# Patient Record
Sex: MALE | Race: BLACK OR AFRICAN AMERICAN | Employment: OTHER | URBAN - METROPOLITAN AREA
[De-identification: names, ages, dates, MRNs, and addresses within clinical notes are randomized per-mention and may not be internally consistent; named-entity substitution may affect disease eponyms.]

---

## 2017-09-14 ENCOUNTER — IP HISTORICAL/CONVERTED ENCOUNTER (OUTPATIENT)
Dept: OTHER | Age: 77
End: 2017-09-14

## 2017-09-24 ENCOUNTER — IP HISTORICAL/CONVERTED ENCOUNTER (OUTPATIENT)
Dept: OTHER | Age: 77
End: 2017-09-24

## 2017-10-02 ENCOUNTER — ED HISTORICAL/CONVERTED ENCOUNTER (OUTPATIENT)
Dept: OTHER | Age: 77
End: 2017-10-02

## 2018-02-12 ENCOUNTER — OP HISTORICAL/CONVERTED ENCOUNTER (OUTPATIENT)
Dept: OTHER | Age: 78
End: 2018-02-12

## 2018-03-18 ENCOUNTER — IP HISTORICAL/CONVERTED ENCOUNTER (OUTPATIENT)
Dept: OTHER | Age: 78
End: 2018-03-18

## 2018-04-05 ENCOUNTER — IP HISTORICAL/CONVERTED ENCOUNTER (OUTPATIENT)
Dept: OTHER | Age: 78
End: 2018-04-05

## 2018-04-19 ENCOUNTER — IP HISTORICAL/CONVERTED ENCOUNTER (OUTPATIENT)
Dept: OTHER | Age: 78
End: 2018-04-19

## 2018-05-05 ENCOUNTER — IP HISTORICAL/CONVERTED ENCOUNTER (OUTPATIENT)
Dept: OTHER | Age: 78
End: 2018-05-05

## 2018-05-21 ENCOUNTER — OP HISTORICAL/CONVERTED ENCOUNTER (OUTPATIENT)
Dept: OTHER | Age: 78
End: 2018-05-21

## 2018-06-26 ENCOUNTER — OP HISTORICAL/CONVERTED ENCOUNTER (OUTPATIENT)
Dept: OTHER | Age: 78
End: 2018-06-26

## 2018-07-12 ENCOUNTER — IP HISTORICAL/CONVERTED ENCOUNTER (OUTPATIENT)
Dept: OTHER | Age: 78
End: 2018-07-12

## 2018-07-22 ENCOUNTER — IP HISTORICAL/CONVERTED ENCOUNTER (OUTPATIENT)
Dept: OTHER | Age: 78
End: 2018-07-22

## 2018-08-01 ENCOUNTER — OP HISTORICAL/CONVERTED ENCOUNTER (OUTPATIENT)
Dept: OTHER | Age: 78
End: 2018-08-01

## 2018-08-07 ENCOUNTER — OP HISTORICAL/CONVERTED ENCOUNTER (OUTPATIENT)
Dept: OTHER | Age: 78
End: 2018-08-07

## 2018-08-13 ENCOUNTER — ED HISTORICAL/CONVERTED ENCOUNTER (OUTPATIENT)
Dept: OTHER | Age: 78
End: 2018-08-13

## 2018-08-24 ENCOUNTER — OP HISTORICAL/CONVERTED ENCOUNTER (OUTPATIENT)
Dept: OTHER | Age: 78
End: 2018-08-24

## 2018-08-28 ENCOUNTER — OP HISTORICAL/CONVERTED ENCOUNTER (OUTPATIENT)
Dept: OTHER | Age: 78
End: 2018-08-28

## 2018-09-11 ENCOUNTER — OP HISTORICAL/CONVERTED ENCOUNTER (OUTPATIENT)
Dept: OTHER | Age: 78
End: 2018-09-11

## 2018-10-17 ENCOUNTER — OP HISTORICAL/CONVERTED ENCOUNTER (OUTPATIENT)
Dept: OTHER | Age: 78
End: 2018-10-17

## 2018-10-29 ENCOUNTER — IP HISTORICAL/CONVERTED ENCOUNTER (OUTPATIENT)
Dept: OTHER | Age: 78
End: 2018-10-29

## 2018-11-05 ENCOUNTER — OP HISTORICAL/CONVERTED ENCOUNTER (OUTPATIENT)
Dept: OTHER | Age: 78
End: 2018-11-05

## 2018-12-11 ENCOUNTER — IP HISTORICAL/CONVERTED ENCOUNTER (OUTPATIENT)
Dept: OTHER | Age: 78
End: 2018-12-11

## 2018-12-25 ENCOUNTER — IP HISTORICAL/CONVERTED ENCOUNTER (OUTPATIENT)
Dept: OTHER | Age: 78
End: 2018-12-25

## 2018-12-31 ENCOUNTER — IP HISTORICAL/CONVERTED ENCOUNTER (OUTPATIENT)
Dept: OTHER | Age: 78
End: 2018-12-31

## 2019-01-19 ENCOUNTER — ED HISTORICAL/CONVERTED ENCOUNTER (OUTPATIENT)
Dept: OTHER | Age: 79
End: 2019-01-19

## 2019-01-21 ENCOUNTER — IP HISTORICAL/CONVERTED ENCOUNTER (OUTPATIENT)
Dept: OTHER | Age: 79
End: 2019-01-21

## 2019-03-06 ENCOUNTER — IP HISTORICAL/CONVERTED ENCOUNTER (OUTPATIENT)
Dept: OTHER | Age: 79
End: 2019-03-06

## 2019-04-03 ENCOUNTER — ED HISTORICAL/CONVERTED ENCOUNTER (OUTPATIENT)
Dept: OTHER | Age: 79
End: 2019-04-03

## 2019-04-09 ENCOUNTER — TELEPHONE (OUTPATIENT)
Dept: CARDIOLOGY CLINIC | Age: 79
End: 2019-04-09

## 2019-04-09 NOTE — TELEPHONE ENCOUNTER
Please arrange for robe estrella [birth year] to see me Weds or Friday of this week from Dr. Carrol Pepper office to discuss upgrade of his ppm to CRTD. Hx CABG, no need for further ischemic eval; EF 30%, recurrent HF exacerbations. PPM dependent.      nishant Le

## 2019-04-22 ENCOUNTER — OFFICE VISIT (OUTPATIENT)
Dept: CARDIOLOGY CLINIC | Age: 79
End: 2019-04-22

## 2019-04-22 VITALS
RESPIRATION RATE: 20 BRPM | OXYGEN SATURATION: 98 % | HEIGHT: 66 IN | BODY MASS INDEX: 29.02 KG/M2 | SYSTOLIC BLOOD PRESSURE: 102 MMHG | WEIGHT: 180.6 LBS | HEART RATE: 76 BPM | DIASTOLIC BLOOD PRESSURE: 60 MMHG

## 2019-04-22 DIAGNOSIS — I50.9 OTHER CONGESTIVE HEART FAILURE (HCC): Primary | ICD-10-CM

## 2019-04-22 DIAGNOSIS — Z95.0 PACEMAKER: ICD-10-CM

## 2019-04-22 RX ORDER — CLOPIDOGREL BISULFATE 75 MG/1
TABLET ORAL
Refills: 3 | COMMUNITY
Start: 2019-04-02

## 2019-04-22 RX ORDER — NATEGLINIDE 120 MG/1
TABLET ORAL
Refills: 1 | COMMUNITY
Start: 2019-04-18 | End: 2019-07-05

## 2019-04-22 RX ORDER — LEVOTHYROXINE SODIUM 88 UG/1
TABLET ORAL
Refills: 1 | COMMUNITY
Start: 2019-03-25

## 2019-04-22 RX ORDER — METOPROLOL SUCCINATE 25 MG/1
TABLET, EXTENDED RELEASE ORAL
Refills: 4 | COMMUNITY
Start: 2019-04-02 | End: 2020-10-16

## 2019-04-22 RX ORDER — EZETIMIBE 10 MG/1
TABLET ORAL
Refills: 1 | COMMUNITY
Start: 2019-04-18

## 2019-04-22 RX ORDER — LISINOPRIL 5 MG/1
TABLET ORAL
Refills: 3 | COMMUNITY
Start: 2019-03-05 | End: 2020-10-16

## 2019-04-22 RX ORDER — NITROGLYCERIN 0.4 MG/1
0.4 TABLET SUBLINGUAL
COMMUNITY

## 2019-04-22 RX ORDER — ATORVASTATIN CALCIUM 40 MG/1
TABLET, FILM COATED ORAL
Refills: 1 | COMMUNITY
Start: 2019-03-21 | End: 2020-10-16

## 2019-04-22 RX ORDER — MELATONIN
DAILY
COMMUNITY

## 2019-04-22 RX ORDER — SITAGLIPTIN 100 MG/1
TABLET, FILM COATED ORAL
Refills: 0 | COMMUNITY
Start: 2019-04-11 | End: 2020-10-22 | Stop reason: ALTCHOICE

## 2019-04-22 RX ORDER — RANOLAZINE 1000 MG/1
TABLET, EXTENDED RELEASE ORAL
Refills: 4 | COMMUNITY
Start: 2019-04-18

## 2019-04-22 RX ORDER — FUROSEMIDE 40 MG/1
TABLET ORAL
Refills: 1 | COMMUNITY
Start: 2019-03-29 | End: 2020-10-22 | Stop reason: ALTCHOICE

## 2019-04-22 RX ORDER — LANOLIN ALCOHOL/MO/W.PET/CERES
CREAM (GRAM) TOPICAL
Refills: 1 | COMMUNITY
Start: 2019-03-21

## 2019-04-22 RX ORDER — CYANOCOBALAMIN 1000 UG/ML
INJECTION, SOLUTION INTRAMUSCULAR; SUBCUTANEOUS
Refills: 5 | COMMUNITY
Start: 2019-03-25

## 2019-04-22 RX ORDER — PANTOPRAZOLE SODIUM 40 MG/1
TABLET, DELAYED RELEASE ORAL
Refills: 1 | COMMUNITY
Start: 2019-02-23

## 2019-04-22 NOTE — PROGRESS NOTES
Room # 3 SOB with least exertion, palpitations, when walking legs get to feeling weak and hands shake Visit Vitals /60 (BP 1 Location: Left arm, BP Patient Position: Sitting) Pulse 76 Resp 20 Ht 5' 6\" (1.676 m) Wt 180 lb 9.6 oz (81.9 kg) SpO2 98% BMI 29.15 kg/m²

## 2019-04-22 NOTE — PROGRESS NOTES
HISTORY OF PRESENTING ILLNESS      Pham Jesus is a [de-identified] y.o. male with history of ischemic cardiomyopathy, CAD/CABG (1988) , left ventricular ejection fraction 25-30%, moderate tricuspid regurgitation, myocardial infarction, severe peripheral vascular disease status post peripheral intervention (3/21/2018), dyslipidemia, dual-chamber Medtronic pacemaker, hypertension, diabetes mellitus presenting for consideration for upgrade of his ICD to biventricular system. He is currently NYHA class II-III. He has been on optimal guideline directed medical therapy long-term. Device interrogation recently revealed a greater than 40% ventricular pacing burden. EKG today shows a QRS duration of 166 ms. ACTIVE PROBLEM LIST     There are no active problems to display for this patient. PAST MEDICAL HISTORY     No past medical history on file. PAST SURGICAL HISTORY     No past surgical history on file. ALLERGIES     Allergies not on file       FAMILY HISTORY     No family history on file. negative for cardiac disease       SOCIAL HISTORY            MEDICATIONS     No current outpatient medications on file. No current facility-administered medications for this visit. I have reviewed the nurses notes, vitals, problem list, allergy list, medical history, family, social history and medications. REVIEW OF SYMPTOMS      General: Pt denies excessive weight gain or loss. Pt is able to conduct ADL's  HEENT: Denies blurred vision, headaches, hearing loss, epistaxis and difficulty swallowing. Respiratory: Denies cough, congestion, shortness of breath, RICHEY, wheezing or stridor.   Cardiovascular: Denies precordial pain, palpitations, edema or PND  Gastrointestinal: Denies poor appetite, indigestion, abdominal pain or blood in stool  Genitourinary: Denies hematuria, dysuria, increased urinary frequency  Musculoskeletal: Denies joint pain or swelling from muscles or joints  Neurologic: Denies tremor, paresthesias, headache, or sensory motor disturbance  Psychiatric: Denies confusion, insomnia, depression  Integumentray: Denies rash, itching or ulcers. Hematologic: Denies easy bruising, bleeding       PHYSICAL EXAMINATION      There were no vitals filed for this visit. General: Well developed, in no acute distress. HEENT: No jaundice, oral mucosa moist, no oral ulcers  Neck: Supple, no stiffness, no lymphadenopathy, supple  Heart:  Normal S1/S2 negative S3 or S4. Regular, no murmur, gallop or rub, no jugular venous distention  Respiratory: Clear bilaterally x 4, no wheezing or rales  Abdomen:   Soft, non-tender, bowel sounds are active.   Extremities:  No edema, normal cap refill, no cyanosis. Musculoskeletal: No clubbing, no deformities  Neuro: A&Ox3, speech clear, gait stable, cooperative, no focal neurologic deficits  Skin: Skin color is normal. No rashes or lesions. Non diaphoretic, moist.  Vascular: 2+ pulses symmetric in all extremities       DIAGNOSTIC DATA      EKG: Sinus rhythm with left bundle branch block       LABORATORY DATA    No results found for: WBC, HGBPOC, HGB, HGBP, HCTPOC, HCT, PHCT, RBCH, PLT, MCV, HGBEXT, HCTEXT, PLTEXT   No results found for: NA, K, CL, CO2, AGAP, GLU, BUN, CREA, BUCR, GFRAA, GFRNA, CA, TBIL, TBILI, GPT, SGOT, AP, TP, ALB, GLOB, AGRAT, ALT        ASSESSMENT      1. Cardiomyopathy    A. Ischemic    B. NYHA class II-III    C. LVEF 25-30%   2. Left bundle branch block  3. Diabetes mellitus  4. CAD, native  5. CABG  6. History myocardial infarction  7. Pacemaker   A. Dual-chamber   B. Left-sided   C. Medtronic   D. 9/2017  8. Hypertension  9. Peripheral vascular disease status post intervention  10. Diverticulosis with history of rectal bleeding       PLAN     Plan for upgrade of dual-chamber PPM to a CRTD system for primary prevention of sudden death.         FOLLOW-UP     Post procedure      Thank you, Dr. Keshia Caldwell for allowing me to participate in the care of this extraordinarily pleasant male. Please do not hesitate to contact me for further questions/concerns.          Dominick iLriano MD  Cardiac Electrophysiology / Cardiology    [address]  [address] [facility] [place] [facility], [place] [address]  [facility]    [address] [facility]  [phone] / [phone] Fax   [phone] / [phone] Fax

## 2019-04-22 NOTE — PATIENT INSTRUCTIONS

## 2019-04-23 DIAGNOSIS — Z95.0 PACEMAKER: ICD-10-CM

## 2019-04-23 DIAGNOSIS — I50.9 OTHER CONGESTIVE HEART FAILURE (HCC): Primary | ICD-10-CM

## 2019-04-23 PROBLEM — I44.7 LEFT BUNDLE BRANCH BLOCK: Status: ACTIVE | Noted: 2019-04-23

## 2019-04-23 PROBLEM — I25.5 ISCHEMIC CARDIOMYOPATHY: Status: ACTIVE | Noted: 2019-04-23

## 2019-05-10 RX ORDER — SODIUM CHLORIDE 0.9 % (FLUSH) 0.9 %
5-40 SYRINGE (ML) INJECTION EVERY 8 HOURS
Status: CANCELLED | OUTPATIENT
Start: 2019-05-10

## 2019-05-10 RX ORDER — SODIUM CHLORIDE 0.9 % (FLUSH) 0.9 %
5-40 SYRINGE (ML) INJECTION AS NEEDED
Status: CANCELLED | OUTPATIENT
Start: 2019-05-10

## 2019-05-17 ENCOUNTER — APPOINTMENT (OUTPATIENT)
Dept: GENERAL RADIOLOGY | Age: 79
End: 2019-05-17
Attending: INTERNAL MEDICINE
Payer: MEDICARE

## 2019-05-17 ENCOUNTER — HOSPITAL ENCOUNTER (OUTPATIENT)
Age: 79
Setting detail: OBSERVATION
Discharge: HOME | End: 2019-05-18
Attending: INTERNAL MEDICINE | Admitting: INTERNAL MEDICINE
Payer: MEDICARE

## 2019-05-17 DIAGNOSIS — I42.9 CARDIOMYOPATHY (HCC): ICD-10-CM

## 2019-05-17 LAB
GLUCOSE BLD STRIP.AUTO-MCNC: 153 MG/DL (ref 65–100)
GLUCOSE BLD STRIP.AUTO-MCNC: 168 MG/DL (ref 65–100)
GLUCOSE BLD STRIP.AUTO-MCNC: 203 MG/DL (ref 65–100)
GLUCOSE BLD STRIP.AUTO-MCNC: 206 MG/DL (ref 65–100)
GLUCOSE BLD STRIP.AUTO-MCNC: 209 MG/DL (ref 65–100)
SERVICE CMNT-IMP: ABNORMAL

## 2019-05-17 PROCEDURE — 77030037713 HC CLOSR DEV INCIS ZIP STRY -B: Performed by: INTERNAL MEDICINE

## 2019-05-17 PROCEDURE — C1777 LEAD, AICD, ENDO SINGLE COIL: HCPCS | Performed by: INTERNAL MEDICINE

## 2019-05-17 PROCEDURE — 99218 HC RM OBSERVATION: CPT

## 2019-05-17 PROCEDURE — 74011636637 HC RX REV CODE- 636/637: Performed by: NURSE PRACTITIONER

## 2019-05-17 PROCEDURE — 74011000272 HC RX REV CODE- 272: Performed by: INTERNAL MEDICINE

## 2019-05-17 PROCEDURE — 82962 GLUCOSE BLOOD TEST: CPT

## 2019-05-17 PROCEDURE — 74011000258 HC RX REV CODE- 258: Performed by: INTERNAL MEDICINE

## 2019-05-17 PROCEDURE — 77030031139 HC SUT VCRL2 J&J -A: Performed by: INTERNAL MEDICINE

## 2019-05-17 PROCEDURE — 99153 MOD SED SAME PHYS/QHP EA: CPT | Performed by: INTERNAL MEDICINE

## 2019-05-17 PROCEDURE — 77030003390 HC NDL ANGI MRTM -A: Performed by: INTERNAL MEDICINE

## 2019-05-17 PROCEDURE — 77030002933 HC SUT MCRYL J&J -A: Performed by: INTERNAL MEDICINE

## 2019-05-17 PROCEDURE — 74011250637 HC RX REV CODE- 250/637: Performed by: INTERNAL MEDICINE

## 2019-05-17 PROCEDURE — 74011636320 HC RX REV CODE- 636/320: Performed by: INTERNAL MEDICINE

## 2019-05-17 PROCEDURE — C1900 LEAD, CORONARY VENOUS: HCPCS | Performed by: INTERNAL MEDICINE

## 2019-05-17 PROCEDURE — 77030018547 HC SUT ETHBND1 J&J -B: Performed by: INTERNAL MEDICINE

## 2019-05-17 PROCEDURE — C1769 GUIDE WIRE: HCPCS | Performed by: INTERNAL MEDICINE

## 2019-05-17 PROCEDURE — C1893 INTRO/SHEATH, FIXED,NON-PEEL: HCPCS | Performed by: INTERNAL MEDICINE

## 2019-05-17 PROCEDURE — 74011250636 HC RX REV CODE- 250/636

## 2019-05-17 PROCEDURE — 77030018729 HC ELECTRD DEFIB PAD CARD -B: Performed by: INTERNAL MEDICINE

## 2019-05-17 PROCEDURE — 77030018673: Performed by: INTERNAL MEDICINE

## 2019-05-17 PROCEDURE — C1882 AICD, OTHER THAN SING/DUAL: HCPCS | Performed by: INTERNAL MEDICINE

## 2019-05-17 PROCEDURE — 99152 MOD SED SAME PHYS/QHP 5/>YRS: CPT | Performed by: INTERNAL MEDICINE

## 2019-05-17 PROCEDURE — 33249 INSJ/RPLCMT DEFIB W/LEAD(S): CPT | Performed by: INTERNAL MEDICINE

## 2019-05-17 PROCEDURE — 77030029065 HC DRSG HEMO QCLOT ZMED -B

## 2019-05-17 PROCEDURE — 74011000250 HC RX REV CODE- 250: Performed by: INTERNAL MEDICINE

## 2019-05-17 PROCEDURE — A4565 SLINGS: HCPCS | Performed by: INTERNAL MEDICINE

## 2019-05-17 PROCEDURE — C1887 CATHETER, GUIDING: HCPCS | Performed by: INTERNAL MEDICINE

## 2019-05-17 PROCEDURE — 74011250636 HC RX REV CODE- 250/636: Performed by: INTERNAL MEDICINE

## 2019-05-17 PROCEDURE — 77030012935 HC DRSG AQUACEL BMS -B: Performed by: INTERNAL MEDICINE

## 2019-05-17 PROCEDURE — 77030028698 HC BLD TISS PLSM MEDT -D: Performed by: INTERNAL MEDICINE

## 2019-05-17 PROCEDURE — 71046 X-RAY EXAM CHEST 2 VIEWS: CPT

## 2019-05-17 PROCEDURE — C1894 INTRO/SHEATH, NON-LASER: HCPCS | Performed by: INTERNAL MEDICINE

## 2019-05-17 DEVICE — CRTD DTMA1QQ CLARIA MRI QUAD CRTD US
Type: IMPLANTABLE DEVICE | Status: FUNCTIONAL
Brand: CLARIA MRI™ QUAD CRT-D SURESCAN™

## 2019-05-17 DEVICE — LEAD 6935M62 QUATTRO SECURE S MRI US
Type: IMPLANTABLE DEVICE | Status: FUNCTIONAL
Brand: SPRINT QUATTRO SECURE S MRI™ SURESCAN™

## 2019-05-17 DEVICE — LEAD 429888 MRI CANT US
Type: IMPLANTABLE DEVICE | Status: FUNCTIONAL
Brand: ATTAIN PERFORMA™ MRI SURESCAN™

## 2019-05-17 RX ORDER — PANTOPRAZOLE SODIUM 40 MG/1
40 TABLET, DELAYED RELEASE ORAL
Status: DISCONTINUED | OUTPATIENT
Start: 2019-05-18 | End: 2019-05-18 | Stop reason: HOSPADM

## 2019-05-17 RX ORDER — CYPROHEPTADINE HYDROCHLORIDE 4 MG/1
4 TABLET ORAL
COMMUNITY
End: 2019-07-05

## 2019-05-17 RX ORDER — LISINOPRIL 5 MG/1
2.5 TABLET ORAL DAILY
Status: DISCONTINUED | OUTPATIENT
Start: 2019-05-17 | End: 2019-05-18 | Stop reason: HOSPADM

## 2019-05-17 RX ORDER — SODIUM CHLORIDE 0.9 % (FLUSH) 0.9 %
5-40 SYRINGE (ML) INJECTION EVERY 8 HOURS
Status: DISCONTINUED | OUTPATIENT
Start: 2019-05-17 | End: 2019-05-17 | Stop reason: HOSPADM

## 2019-05-17 RX ORDER — EZETIMIBE 10 MG/1
10 TABLET ORAL DAILY
Status: DISCONTINUED | OUTPATIENT
Start: 2019-05-18 | End: 2019-05-18 | Stop reason: HOSPADM

## 2019-05-17 RX ORDER — FUROSEMIDE 40 MG/1
40 TABLET ORAL DAILY
Status: DISCONTINUED | OUTPATIENT
Start: 2019-05-18 | End: 2019-05-18 | Stop reason: HOSPADM

## 2019-05-17 RX ORDER — LEVOTHYROXINE SODIUM 88 UG/1
88 TABLET ORAL
Status: DISCONTINUED | OUTPATIENT
Start: 2019-05-18 | End: 2019-05-18 | Stop reason: HOSPADM

## 2019-05-17 RX ORDER — INSULIN LISPRO 100 [IU]/ML
INJECTION, SOLUTION INTRAVENOUS; SUBCUTANEOUS
Status: DISCONTINUED | OUTPATIENT
Start: 2019-05-17 | End: 2019-05-18 | Stop reason: HOSPADM

## 2019-05-17 RX ORDER — ONDANSETRON 2 MG/ML
4 INJECTION INTRAMUSCULAR; INTRAVENOUS
Status: DISCONTINUED | OUTPATIENT
Start: 2019-05-17 | End: 2019-05-18 | Stop reason: HOSPADM

## 2019-05-17 RX ORDER — SODIUM CHLORIDE 0.9 % (FLUSH) 0.9 %
5-40 SYRINGE (ML) INJECTION AS NEEDED
Status: DISCONTINUED | OUTPATIENT
Start: 2019-05-17 | End: 2019-05-18 | Stop reason: HOSPADM

## 2019-05-17 RX ORDER — RANOLAZINE 500 MG/1
1000 TABLET, EXTENDED RELEASE ORAL 2 TIMES DAILY
Status: DISCONTINUED | OUTPATIENT
Start: 2019-05-17 | End: 2019-05-18 | Stop reason: HOSPADM

## 2019-05-17 RX ORDER — CEFAZOLIN SODIUM/WATER 2 G/20 ML
2 SYRINGE (ML) INTRAVENOUS ONCE
Status: DISCONTINUED | OUTPATIENT
Start: 2019-05-17 | End: 2019-05-17 | Stop reason: HOSPADM

## 2019-05-17 RX ORDER — LIDOCAINE HYDROCHLORIDE AND EPINEPHRINE 10; 10 MG/ML; UG/ML
INJECTION, SOLUTION INFILTRATION; PERINEURAL AS NEEDED
Status: DISCONTINUED | OUTPATIENT
Start: 2019-05-17 | End: 2019-05-17 | Stop reason: HOSPADM

## 2019-05-17 RX ORDER — INSULIN GLARGINE 100 [IU]/ML
10 INJECTION, SOLUTION SUBCUTANEOUS DAILY PRN
Status: DISCONTINUED | OUTPATIENT
Start: 2019-05-18 | End: 2019-05-18 | Stop reason: HOSPADM

## 2019-05-17 RX ORDER — NATEGLINIDE 120 MG/1
120 TABLET ORAL
Status: DISCONTINUED | OUTPATIENT
Start: 2019-05-17 | End: 2019-05-18 | Stop reason: HOSPADM

## 2019-05-17 RX ORDER — SODIUM CHLORIDE 0.9 % (FLUSH) 0.9 %
5-40 SYRINGE (ML) INJECTION EVERY 8 HOURS
Status: DISCONTINUED | OUTPATIENT
Start: 2019-05-17 | End: 2019-05-18 | Stop reason: HOSPADM

## 2019-05-17 RX ORDER — SODIUM CHLORIDE 0.9 % (FLUSH) 0.9 %
5-40 SYRINGE (ML) INJECTION AS NEEDED
Status: DISCONTINUED | OUTPATIENT
Start: 2019-05-17 | End: 2019-05-17 | Stop reason: HOSPADM

## 2019-05-17 RX ORDER — ATORVASTATIN CALCIUM 20 MG/1
40 TABLET, FILM COATED ORAL
Status: DISCONTINUED | OUTPATIENT
Start: 2019-05-17 | End: 2019-05-18 | Stop reason: HOSPADM

## 2019-05-17 RX ORDER — GENTAMICIN SULFATE 80 MG/100ML
80 INJECTION, SOLUTION INTRAVENOUS ONCE
Status: COMPLETED | OUTPATIENT
Start: 2019-05-17 | End: 2019-05-17

## 2019-05-17 RX ORDER — FENTANYL CITRATE 50 UG/ML
INJECTION, SOLUTION INTRAMUSCULAR; INTRAVENOUS AS NEEDED
Status: DISCONTINUED | OUTPATIENT
Start: 2019-05-17 | End: 2019-05-17 | Stop reason: HOSPADM

## 2019-05-17 RX ORDER — CEFAZOLIN SODIUM 1 G/3ML
INJECTION, POWDER, FOR SOLUTION INTRAMUSCULAR; INTRAVENOUS AS NEEDED
Status: DISCONTINUED | OUTPATIENT
Start: 2019-05-17 | End: 2019-05-17 | Stop reason: HOSPADM

## 2019-05-17 RX ORDER — HYDROCODONE BITARTRATE AND ACETAMINOPHEN 5; 325 MG/1; MG/1
1 TABLET ORAL
Status: DISCONTINUED | OUTPATIENT
Start: 2019-05-17 | End: 2019-05-18 | Stop reason: HOSPADM

## 2019-05-17 RX ORDER — LIDOCAINE HYDROCHLORIDE 10 MG/ML
INJECTION INFILTRATION; PERINEURAL AS NEEDED
Status: DISCONTINUED | OUTPATIENT
Start: 2019-05-17 | End: 2019-05-17 | Stop reason: HOSPADM

## 2019-05-17 RX ORDER — DEXTROSE MONOHYDRATE 25 G/50ML
12.5-25 INJECTION, SOLUTION INTRAVENOUS AS NEEDED
Status: DISCONTINUED | OUTPATIENT
Start: 2019-05-17 | End: 2019-05-18 | Stop reason: HOSPADM

## 2019-05-17 RX ORDER — ACETAMINOPHEN 325 MG/1
650 TABLET ORAL
Status: DISCONTINUED | OUTPATIENT
Start: 2019-05-17 | End: 2019-05-18 | Stop reason: HOSPADM

## 2019-05-17 RX ORDER — MAGNESIUM SULFATE 100 %
4 CRYSTALS MISCELLANEOUS AS NEEDED
Status: DISCONTINUED | OUTPATIENT
Start: 2019-05-17 | End: 2019-05-18 | Stop reason: HOSPADM

## 2019-05-17 RX ORDER — CEPHALEXIN 500 MG/1
500 CAPSULE ORAL 3 TIMES DAILY
Qty: 15 CAP | Refills: 0 | Status: SHIPPED | OUTPATIENT
Start: 2019-05-17 | End: 2019-05-22

## 2019-05-17 RX ORDER — BUPIVACAINE HYDROCHLORIDE 5 MG/ML
INJECTION, SOLUTION EPIDURAL; INTRACAUDAL AS NEEDED
Status: DISCONTINUED | OUTPATIENT
Start: 2019-05-17 | End: 2019-05-17 | Stop reason: HOSPADM

## 2019-05-17 RX ORDER — MIDAZOLAM HYDROCHLORIDE 1 MG/ML
INJECTION, SOLUTION INTRAMUSCULAR; INTRAVENOUS AS NEEDED
Status: DISCONTINUED | OUTPATIENT
Start: 2019-05-17 | End: 2019-05-17 | Stop reason: HOSPADM

## 2019-05-17 RX ORDER — METOPROLOL SUCCINATE 25 MG/1
25 TABLET, EXTENDED RELEASE ORAL DAILY
Status: DISCONTINUED | OUTPATIENT
Start: 2019-05-17 | End: 2019-05-18 | Stop reason: HOSPADM

## 2019-05-17 RX ADMIN — INSULIN LISPRO 3 UNITS: 100 INJECTION, SOLUTION INTRAVENOUS; SUBCUTANEOUS at 17:23

## 2019-05-17 RX ADMIN — INSULIN LISPRO 2 UNITS: 100 INJECTION, SOLUTION INTRAVENOUS; SUBCUTANEOUS at 13:49

## 2019-05-17 RX ADMIN — HYDROCODONE BITARTRATE AND ACETAMINOPHEN 1 TABLET: 5; 325 TABLET ORAL at 18:59

## 2019-05-17 RX ADMIN — ATORVASTATIN CALCIUM 40 MG: 20 TABLET, FILM COATED ORAL at 21:23

## 2019-05-17 RX ADMIN — METOPROLOL SUCCINATE 25 MG: 25 TABLET, EXTENDED RELEASE ORAL at 14:46

## 2019-05-17 RX ADMIN — RANOLAZINE 1000 MG: 500 TABLET, FILM COATED, EXTENDED RELEASE ORAL at 17:22

## 2019-05-17 RX ADMIN — LISINOPRIL 2.5 MG: 5 TABLET ORAL at 14:46

## 2019-05-17 RX ADMIN — Medication 10 ML: at 14:46

## 2019-05-17 RX ADMIN — CEFAZOLIN 1 G: 1 INJECTION, POWDER, FOR SOLUTION INTRAMUSCULAR; INTRAVENOUS at 17:23

## 2019-05-17 RX ADMIN — NATEGLINIDE 120 MG: 120 TABLET, COATED ORAL at 17:22

## 2019-05-17 RX ADMIN — Medication 2 G: at 09:00

## 2019-05-17 RX ADMIN — Medication 10 ML: at 21:23

## 2019-05-17 NOTE — H&P
HISTORY OF PRESENTING ILLNESS Piyush Michel is a [de-identified] y.o. male with history of ischemic cardiomyopathy, CAD/CABG, left ventricular ejection fraction 25-30%, moderate tricuspid regurgitation, myocardial infarction, severe peripheral vascular disease status post peripheral intervention, dyslipidemia, dual-chamber Medtronic pacemaker, hypertension, diabetes mellitus presenting for consideration for upgrade of his ICD to biventricular system. He is currently NYHA class II-III. He has been on optimal guideline directed medical therapy long-term. Device interrogation recently revealed a greater than 40% ventricular pacing burden. EKG today shows a QRS duration of 166 ms. 
  
  
 ACTIVE PROBLEM LIST  
  
There are no active problems to display for this patient. 
  
  
  
 PAST MEDICAL HISTORY  
  
No past medical history on file.  
  
  
 PAST SURGICAL HISTORY  
  
No past surgical history on file. 
  
  
 ALLERGIES  
  
Allergies not on file  
  
  
FAMILY HISTORY  
  
No family history on file. negative for cardiac disease 
  
  
 SOCIAL HISTORY  
  
  
  
MEDICATIONS  
  
No current outpatient medications on file.  
  
No current facility-administered medications for this visit.   
  
  
I have reviewed the nurses notes, vitals, problem list, allergy list, medical history, family, social history and medications. 
  
  
 REVIEW OF SYMPTOMS General: Pt denies excessive weight gain or loss. Pt is able to conduct ADL's HEENT: Denies blurred vision, headaches, hearing loss, epistaxis and difficulty swallowing. Respiratory: Denies cough, congestion, shortness of breath, RICHEY, wheezing or stridor. Cardiovascular: Denies precordial pain, palpitations, edema or PND Gastrointestinal: Denies poor appetite, indigestion, abdominal pain or blood in stool Genitourinary: Denies hematuria, dysuria, increased urinary frequency Musculoskeletal: Denies joint pain or swelling from muscles or joints Neurologic: Denies tremor, paresthesias, headache, or sensory motor disturbance Psychiatric: Denies confusion, insomnia, depression Integumentray: Denies rash, itching or ulcers. Hematologic: Denies easy bruising, bleeding 
  
  
 PHYSICAL EXAMINATION There were no vitals filed for this visit. General: Well developed, in no acute distress. HEENT: No jaundice, oral mucosa moist, no oral ulcers Neck: Supple, no stiffness, no lymphadenopathy, supple Heart:  Normal S1/S2 negative S3 or S4. Regular, no murmur, gallop or rub, no jugular venous distention Respiratory: Clear bilaterally x 4, no wheezing or rales Abdomen:   Soft, non-tender, bowel sounds are active.  
Extremities:  No edema, normal cap refill, no cyanosis. Musculoskeletal: No clubbing, no deformities Neuro: A&Ox3, speech clear, gait stable, cooperative, no focal neurologic deficits Skin: Skin color is normal. No rashes or lesions. Non diaphoretic, moist. 
Vascular: 2+ pulses symmetric in all extremities 
  
  
 DIAGNOSTIC DATA EKG: Sinus rhythm with left bundle branch block 
  
  
 LABORATORY DATA No results found for: WBC, HGBPOC, HGB, HGBP, HCTPOC, HCT, PHCT, RBCH, PLT, MCV, HGBEXT, HCTEXT, PLTEXT No results found for: NA, K, CL, CO2, AGAP, GLU, BUN, CREA, BUCR, GFRAA, GFRNA, CA, TBIL, TBILI, GPT, SGOT, AP, TP, ALB, GLOB, AGRAT, ALT  
  
  
 ASSESSMENT 1. Cardiomyopathy A. Ischemic B. NYHA class II-III C. LVEF 25-30% 2. Left bundle branch block 3. Diabetes mellitus 4. CAD, native 5. CABG 6. History myocardial infarction 7. Pacemaker Gilles HADLEY Left-sided C. Medtronic D. 9/2017 8. Hypertension 9. Peripheral vascular disease status post intervention 10.   Diverticulosis with history of rectal bleeding 
  
  
 PLAN  
  
Plan for upgrade of dual-chamber PPM to a CRTD system for primary prevention of sudden death.  
  
  
Prince Bolivar MD 
Cardiac Electrophysiology / Cardiology 
  
[address], [facility]5 [facility], [address] [facility] 
[phone] / [phone] Fax                                    [phone] / [phone] Fax

## 2019-05-17 NOTE — Clinical Note
TRANSFER - IN REPORT:  
 
Verbal report received from: santino. Report consisted of patient's Situation, Background, Assessment and  
Recommendations(SBAR). Opportunity for questions and clarification was provided. Assessment completed upon patient's arrival to unit and care assumed. Patient transported with a Registered Nurse.

## 2019-05-17 NOTE — PROGRESS NOTES
Bedside and Verbal shift change report given to Cyrus Cuadra RN (oncoming nurse) by Gideon Swann RN (offgoing nurse). Report included the following information SBAR, Kardex, OR Summary, Procedure Summary, Intake/Output, MAR and Recent Results.

## 2019-05-17 NOTE — Clinical Note
Cardiolab had to be restarted from 0929 to [phone] pt vs monitored on portable equipment.   
 
 
0934 bb/p 116/64  o2 sats 100%  RR 16

## 2019-05-17 NOTE — Clinical Note
TRANSFER - OUT REPORT:  
 
Verbal report given to: [facility] Narvaez Report consisted of patient's Situation, Background, Assessment and  
Recommendations(SBAR). Opportunity for questions and clarification was provided. Patient transported with a Registered Nurse.

## 2019-05-17 NOTE — DISCHARGE INSTRUCTIONS
ICD  Discharge Instructions    Please make sure you have received your Temporary ICD identification card with your discharge instructions      MEDICATIONS         Take only the medications prescribed to you at discharge. ACTIVITY         Return to your normal activity, except as noted below. o Do not lift anything heavier than 10 pounds for 4 weeks with the affected arm. This is how long it takes the muscles to heal, and the leads inside your heart to stabilize their position. o Do not reach above your head with the affected arm for 4 weeks, doing so increases the risk of lead dislodgement.    o It is, however, important to move the affected arm to prevent shoulder stiffness and locking. o Avoid tight clothes or unnecessary pressure over your incision (such as bra straps or seat belts). If it is tender or sensitive to clothing, cover the incision with a soft dressing or pad.  o Questions about driving are individualized and should be discussed with one of the EP Physicians prior to discharge. SHOWERING         Leave the bandage over your incision for 7 days after the ICD implant. You bandage will be removed in clinic in 7 days.  It is important to keep the bandaged area clean and dry. You may shower around the site until the bandage is removed in clinic. Thereafter, you may shower after the bandage is removed, washing it gently with soap and water. Do not apply any lotions, powders, or perfumes to the incision line.  Avoid submerging your incision in water (tub baths, hot tubs, or swimming) for four weeks.  Underneath the dressing. o If you have white steri-strips over your incision (underneath the gauze dressing), they will curl up at the end and fall off, usually within 10 days. Do not pull them off.  - OR -   o You may have a different type of closure for the incision.   If Dermabond Adhesive was used to close your incision, you will receive a separate instruction sheet.      DISCHARGE PRECAUTIONS         Record your temperature every day, at the same time, for 3 weeks after your implant. A temperature of 100.5 F, or higher, can be the first sign of infection. This should be reported to your Doctor immediately.  You cannot have an MRI. You must be aware that any strong magnet or magnetic field can affect your ICD. In general, be careful of metal detectors, heavy machinery, and any area where arc-welding is performed. Avoid metal detectors such as the ones in security checkpoints at [facility] or [address] [place] [address]. When approaching a security checkpoint show your ICD Identification Card to security personnel and ask to be hand searched.  Always tell your doctor or dentist that you have an ICD. Antibiotics may be prescribed before certain procedures.  If you use a cell phone, hold it on the opposite side from where your ICD is implanted.  Your temporary identification will be given to you with these instructions. Keep your ICD card in your wallet or on your person at all times. You should receive your permanent card in 8 weeks. If you do not receive your permanent card, please call the office at [phone]. TAKING YOUR PULSE         Take your pulse the same time every day, preferably in the morning.  Sit down and rest for 5 minutes prior to taking your pulse.  Take your pulse for 1 full minute, use a clock or stop watch with a second hand.  To feel your pulse, use the first two fingers of one hand; place them on the thumb side of the wrist of the opposite hand. The pulse will be steady, regular and throbbing.  Call the office if your pulse is less than 40 beats per minute. SYMPTOMS THAT NEED TO BE REPORTED IMMEDIATELY         Temperature more than 100.4 F     Redness or warmth at the incision site, or pain for longer than the first 5 days after the implant.  Drainage from the incision site.      Swelling around the incision site.  Shortness of breath.  Rapid heart rate or palpitations.  Dizziness, lightheadedness, fainting.  Slow pulse below 40 beats per minute.  REMEMBER: If you feel something is an emergency or cannot be handled over the phone, call 911 or go to the closest emergency room. WHAT TO DO IF YOUR ICD DELIVERS A SHOCK     · If you ICD delivers a shock, you should seek attention at the nearest emergency room, call our office or call 911.           Juan Pablo Wallis MD  Cardiac Electrophysiology / Cardiology    [facility].  [address] [facility], [address] [facility], [address]  [facility]  [phone] / [phone] Fax       [phone] / [phone] Fax

## 2019-05-17 NOTE — PROCEDURES
Cardiac Electrophysiology Report        PATIENT INFORMATION     Patient Name: Donte Pedroza     MRN: [id number]    Study Date: 2019    YOB: 1940      Age: [de-identified] y.o. Gender: male      Procedure: Upgrade of Dual Chamber Pacemaker to Biventricular ICD    Referring Physician:  Rita Sawant MD and Dr. Allyson Larson     Duty Name   Electrophysiologist Zachery Abad MD   Monitor Anesthesia service   Circulator Ambrocio Quintana RT; Gali Flores RN; Kevin Flores RN       PATIENT HISTORY     Handy Ibrahim a [de-identified] y. o. male with history of ischemic cardiomyopathy, CAD/CABG, left ventricular ejection fraction 25-30%, moderate tricuspid regurgitation, myocardial infarction, severe peripheral vascular disease status post peripheral intervention, dyslipidemia, dual-chamber Medtronic pacemaker, hypertension, diabetes mellitus presenting for consideration for upgrade of his ICD to biventricular system.  He is currently NYHA class II-III.  He has been on optimal guideline directed medical therapy long-term.  Device interrogation recently revealed a greater than 40% ventricular pacing burden.  EKG today shows a QRS duration of 166 ms. PROCEDURE     The patient was brought to the Cardiac Electrophysiology laboratory in a post-absorptive, fasting state. Informed consent was obtained. A peripheral IV was in place (5F sheath placed in right femoral vein due to lack of adequate IV access elsewhere). Continuous electrocardiographic, blood pressure, O2 saturation and  CO2 monitoring was initiated. Intravenous antibiotics were administered pre-operatively. Self-adhesive cardioversion patches were positioned on the chest.  Conscious sedation was effectuated according to protocol. The patient was then prepped and draped in the usual sterile fashion. A 50/50 mixture of lidocaine (1%) with epinephrine and bupivicaine (0.5%) was utilized for local anesthesia.  An incision was performed over the pulse generator. Sharp and blunt dissection was carried down to the level of the pulse generator. Hemostasis was maintained with electrocautery. The device as found to be implanted subpectorally. Extra-thoracic, subclavian venous access was obtained x 2 and sheaths were placed using the modified Seldinger technique. A single-coil, DF4 ICD lead was advanced under fluoroscopic guidance and positioned in the right ventricle where stable pacing and sensing characteristics were encountered. The sheath was peeled away and the lead was anchored to the pre-pectoralis fascia using O-ethibond non-absorbable suture material. Using various coronary sinus sheaths/catheters/wires, the coronary sinus was catheterized. A balloon-occlusive venogram was performed and demonstrated several CS branch targets. Once engaged, a quad-electrode LV lead was advanced over a 0.014 ACS wire into the target branch where a stable pace/sense characteristic was encountered without phrenic nerve stimulation. The sheath was then removed and the lead was anchored to the pre-pectoralis fascia using O-ethibond, non-absorbable suture material. The device pocket was then revised to accommodate the new larger device and flushed copiously with antibiotic solution. The chronic pace/sense RV lead was capped and placed into the base of the pocket. The chronic pacemaker generator was removed. An ICD generator was connected to the leads and the generator was placed into the pocket. The device was secured to the pocket using O-ethibond, non-absorbable suture material. The pocket was then closed in three layers using 2-O vicryl, 3-O monocryl absorbable suture material and a zipline. The skin was closed using a sub-cuticular technique. A bio-occlusive dressing were applied to the skin. The patient remained hemodynamically stable, tolerated the procedure well and was transferred in stable condition.  There were no immediate complications encountered during the procedure. No specimen were removed; there was minimal blood loss. LEAD & GENERATOR DATA       Model # Serial #   Explanted Generator Medtronic [id number]   ICD Generator Medtronic [id number]   Atrial Lead Medtronic 9366 [id number]   Chronic pace/sense RV Lead Medtronic 5076 [id number]   ICD Lead Medtronic 6935 [id number]   LV Lead Medtronic 4298 [id number]       PACE/SENSE DATA      Sensed Wave (mV) Threshold (V) Impedance (?)   Right Ventricle 10.8 0.8 601   Left Ventricle >30.0 4.7 1751   Shock ------ ------ 59       FINAL PROGRAMMING     Pacing Mode Lower Rate (ppm) Upper Rate (ppm)   VVI 60 130   VF Rate (bpm) # Antitachycardia Pacing First Shock Energy (J)   207 During charging 35 x 6   VT Rate (bpm) # Antitachycardia Pacing First Shock Energy (J)   182 Busts (3) 35 x 5         DEFIBRILLATION THRESHOLD TESTING     Deferred      MEDICATION SUMMARY     Medication Route Unit Total   Gentamycin IV mg 80   Ancef IV grams 2   Fentanyl IV micrograms 125   Versed IV grams 7       RADIOLOGY SUMMARY      Total   Fluoro Time (minutes) 12.0      Dose Area Product (mGy) 145       CONCLUSIONS     1. Successful upgrade of dual chamber pacemaker to a biventricular ICD system  2. Successful pocket revision  3. IV antibiotics x 24 hours for 3 doses followed by Keflex 500 mg po tid x 5 days. 4. Monitor overnight on telemetry. 5. CXR (PA & lateral) and device interrogation in AM.  6. Possible discharge in AM.  7. Wound check in EP clinic in 10 days. 8. Follow up in EP clinic in 1 month or earlier if necessary. 9. Follow up with Humera Loo MD and Dr. Cony Lee as scheduled. Thank you, Humera Loo MD and Dr. Cony Lee for involving me in the care of this extraordinarily pleasant male.               Jasper Lomeli MD  Cardiac Electrophysiology / Cardiology    [facility] [address] [employer]  [address] [facility], [facility], [address] [place] [address],[address]  [place], [facility].       Arleen Roman  [phone] / [phone] Fax     [phone] / [phone] Fax

## 2019-05-17 NOTE — PROGRESS NOTES
08:00 Patient arrived. ID and allergies verified verbally with patient. Pt voices understanding of procedure to be performed. Consent obtained. Pt prepped for procedure. Rt AC PIV placed by Ultrasound. Accucheck is 153. 
0830 TRANSFER - OUT REPORT: 
 
Verbal report given to ARAM Mann(name) on Piyush Michel  being transferred to (unit) for routine progression of care Report consisted of patients Situation, Background, Assessment and  
Recommendations(SBAR). Information from the following report(s) Procedure Summary was reviewed with the receiving nurse. Lines:  
Peripheral IV 05/17/19 Left Antecubital (Active) Site Assessment Clean, dry, & intact 5/17/2019  7:57 AM  
  
 
Opportunity for questions and clarification was provided. Patient transported with: 
 Registered Nurse 10:30 TRANSFER - IN REPORT: 
 
Verbal report received from ARAM Mann(name) on Kenvir Anand  being received from (unit) for routine progression of care Report consisted of patients Situation, Background, Assessment and  
Recommendations(SBAR). Information from the following report(s) Procedure Summary was reviewed with the receiving nurse. Opportunity for questions and clarification was provided. Assessment completed upon patients arrival to unit and care assumed. 11:00, Radiology up to find venous access. Pt returned from EP with Rt femoral sheath. SPENCER mid line IV site obtained by Jenn Jeronimo 
11:33 Rt femoral sheath pulled,manual pressure,[place] clot drsg applied. 12:15 TRANSFER - OUT REPORT: 
 
Verbal report given to ARAM Matta(name) on Piyush Michel  being transferred to [facility](unit) for routine progression of care Report consisted of patients Situation, Background, Assessment and  
Recommendations(SBAR). Information from the following report(s) Procedure Summary was reviewed with the receiving nurse. Lines:  
Peripheral IV Distal;Right;Upper (Active) Site Assessment Clean, dry, & intact 5/17/2019 11:29 AM  
  
 
Opportunity for questions and clarification was provided. Patient transported with: 
 Monitor Registered Nurse

## 2019-05-18 VITALS
OXYGEN SATURATION: 100 % | DIASTOLIC BLOOD PRESSURE: 50 MMHG | HEIGHT: 66 IN | BODY MASS INDEX: 29.55 KG/M2 | SYSTOLIC BLOOD PRESSURE: 131 MMHG | WEIGHT: 183.86 LBS | HEART RATE: 70 BPM | RESPIRATION RATE: 18 BRPM | TEMPERATURE: 97.8 F

## 2019-05-18 LAB
GLUCOSE BLD STRIP.AUTO-MCNC: 162 MG/DL (ref 65–100)
GLUCOSE BLD STRIP.AUTO-MCNC: 186 MG/DL (ref 65–100)
SERVICE CMNT-IMP: ABNORMAL
SERVICE CMNT-IMP: ABNORMAL

## 2019-05-18 PROCEDURE — 74011250636 HC RX REV CODE- 250/636: Performed by: INTERNAL MEDICINE

## 2019-05-18 PROCEDURE — 99218 HC RM OBSERVATION: CPT

## 2019-05-18 PROCEDURE — 74011250637 HC RX REV CODE- 250/637: Performed by: INTERNAL MEDICINE

## 2019-05-18 PROCEDURE — 74011636637 HC RX REV CODE- 636/637: Performed by: NURSE PRACTITIONER

## 2019-05-18 PROCEDURE — 74011000258 HC RX REV CODE- 258: Performed by: INTERNAL MEDICINE

## 2019-05-18 PROCEDURE — 82962 GLUCOSE BLOOD TEST: CPT

## 2019-05-18 RX ADMIN — PANTOPRAZOLE SODIUM 40 MG: 40 TABLET, DELAYED RELEASE ORAL at 05:58

## 2019-05-18 RX ADMIN — CEFAZOLIN 1 G: 1 INJECTION, POWDER, FOR SOLUTION INTRAMUSCULAR; INTRAVENOUS at 08:24

## 2019-05-18 RX ADMIN — NATEGLINIDE 120 MG: 120 TABLET, COATED ORAL at 13:22

## 2019-05-18 RX ADMIN — FUROSEMIDE 40 MG: 40 TABLET ORAL at 08:22

## 2019-05-18 RX ADMIN — INSULIN LISPRO 2 UNITS: 100 INJECTION, SOLUTION INTRAVENOUS; SUBCUTANEOUS at 08:23

## 2019-05-18 RX ADMIN — EZETIMIBE 10 MG: 10 TABLET ORAL at 08:22

## 2019-05-18 RX ADMIN — INSULIN LISPRO 2 UNITS: 100 INJECTION, SOLUTION INTRAVENOUS; SUBCUTANEOUS at 13:22

## 2019-05-18 RX ADMIN — LEVOTHYROXINE SODIUM 88 MCG: 88 TABLET ORAL at 05:58

## 2019-05-18 RX ADMIN — CEFAZOLIN 1 G: 1 INJECTION, POWDER, FOR SOLUTION INTRAMUSCULAR; INTRAVENOUS at 01:11

## 2019-05-18 RX ADMIN — RANOLAZINE 1000 MG: 500 TABLET, FILM COATED, EXTENDED RELEASE ORAL at 08:23

## 2019-05-18 RX ADMIN — NATEGLINIDE 120 MG: 120 TABLET, COATED ORAL at 08:23

## 2019-05-18 NOTE — DISCHARGE SUMMARY
Cardiac Electrophysiology Discharge Summary       Patient ID:  Julito Martinez  [id number]  89 y.o.  1940    Admit Date: 5/17/2019    Discharge Date: 5/18/2019     Admitting Physician: Elsa Person MD     Discharge Physician: Elsa Person MD    Admission Diagnoses: Cardiomyopathy [facility]) [I42.9]; Cardiomyopathy [facility]) [I42.9]    Discharge Diagnoses: Active Problems:    Cardiomyopathy ([address] (5/17/2019)        Discharge Condition: stable    Cardiology Procedures this Admission:  PPM upgrade to [id number] Course: Patient underwent above procedure without complication and remained stable without acute events. Discharge Exam:  Visit Vitals  BP 98/57 (BP 1 Location: Right arm, BP Patient Position: At rest)   Pulse 69   Temp 97.6 °F (36.4 °C)   Resp 18   Ht 5' 6\" (1.676 m)   Wt 183 lb 13.8 oz (83.4 kg)   SpO2 100%   BMI 29.68 kg/m²       Abdomen: soft, non-tender  Extremities: extremities normal  Heart: regular rate and rhythm  Lungs: clear to auscultation bilaterally  Neck: no JVD  Neurologic: Grossly normal  Pulses: 2+ and symmetric    Disposition: Home    Patient Instructions:   Current Discharge Medication List      START taking these medications    Details   cephALEXin (KEFLEX) 500 mg capsule Take 1 Cap by mouth three (3) times daily for 5 days. Qty: 15 Cap, Refills: 0         CONTINUE these medications which have NOT CHANGED    Details   linaclotide (LINZESS) 72 mcg cap Take 72 mcg by mouth daily as needed. cyproheptadine (PERIACTIN) 4 mg tablet Take 4 mg by mouth three (3) times daily as needed.       metoprolol succinate (TOPROL-XL) 25 mg XL tablet TAKE ONE TABLET BY MOUTH ONE TIME DAILY  Refills: 4      clopidogrel (PLAVIX) 75 mg tab TAKE ONE TABLET BY MOUTH ONE TIME DAILY  Refills: 3      JANUVIA 100 mg tablet TAKE ONE TABLET BY MOUTH ONE TIME DAILY  Refills: 0      ezetimibe (ZETIA) 10 mg tablet TAKE ONE TABLET BY MOUTH AT BEDTIME  Refills: 1      ranolazine ER (RANEXA) 1,000 mg TAKE ONE TABLET BY MOUTH TWICE A DAY  Refills: 4      levothyroxine (SYNTHROID) 88 mcg tablet TAKE ONE TABLET BY MOUTH EVERY MORNING  Refills: 1      nateglinide (STARLIX) 120 mg tablet TAKE ONE TABLET BY MOUTH THREE TIMES A DAY  Refills: 1      cyanocobalamin (VITAMIN B12) 1,000 mcg/mL injection INJECT 1 ML SUBCUTANEOUSLY ONCE MONTHLY  Refills: 5      atorvastatin (LIPITOR) 40 mg tablet TAKE ONE TABLET BY MOUTH ONE TIME DAILY AT BEDTIME  Refills: 1      ferrous sulfate 325 mg (65 mg iron) tablet TAKE ONE TABLET BY MOUTH TWICE A DAY  Refills: 1      lisinopril (PRINIVIL, ZESTRIL) 5 mg tablet 2.5 mg daily  Refills: 3      furosemide (LASIX) 40 mg tablet TAKE ONE TABLET BY MOUTH ONE TIME DAILY  Refills: 1      pantoprazole (PROTONIX) 40 mg tablet TAKE ONE TABLET BY MOUTH EVERY MORNING  Refills: 1      cholecalciferol (VITAMIN D3) 1,000 unit tablet Take  by mouth daily. insulin detemir (LEVEMIR FLEXTOUCH U-100 INSULN SC) by SubCUTAneous route. Indications: as needed for BS > 150 once a day then take 10 units      nitroglycerin (NITROSTAT) 0.4 mg SL tablet 0.4 mg by SubLINGual route every five (5) minutes as needed for Chest Pain. Up to 3 doses. Referenced discharge instructions provided by nursing for diet and activity.     Follow-up with Soledad Yu MD             Signed:  Jeet Antunez MD  Cardiac Electrophysiology  5/18/2019  10:19 AM

## 2019-05-18 NOTE — PROGRESS NOTES
Pt. And granddaughter at bedside. Pt. Given d/c instructions. RN goes over paperwork and answers all questions. Pt and granddaughter verbalize understanding. Pt. Signs d/c instructions via e-signature pad. IV removed. Pt. Transported off floor via wheelchair with volunteer. Pt. Will be transported home with granddaughter. Will continue to monitor.

## 2019-05-19 NOTE — PROGRESS NOTES
Documented 5/19/2019. Patient visited by [facility] Partner Volunteer on Medical Surgical Unit on 5/18/2019. 
  
Rev. Pricilla Valiente, [facility] paging service: [phone]

## 2019-06-03 ENCOUNTER — OFFICE VISIT (OUTPATIENT)
Dept: CARDIOLOGY CLINIC | Age: 79
End: 2019-06-03

## 2019-06-03 DIAGNOSIS — Z95.810 BIVENTRICULAR ICD (IMPLANTABLE CARDIOVERTER-DEFIBRILLATOR) IN PLACE: Primary | ICD-10-CM

## 2019-06-03 DIAGNOSIS — Z51.89 VISIT FOR WOUND CHECK: ICD-10-CM

## 2019-06-03 NOTE — PROGRESS NOTES
Patient presents for wound check post-device implantation (upgrade to CRT-D). The dressing was removed and the site was inspected. The site appeared to be well-healing without ecchymosis/tenderness/erythema. Denies pain, fevers, discharge. Plan:    Continue follow up in device clinic as planned.        Sai Chopra NP

## 2019-06-19 ENCOUNTER — DOCUMENTATION ONLY (OUTPATIENT)
Dept: CARDIOLOGY CLINIC | Age: 79
End: 2019-06-19

## 2019-06-19 NOTE — PROGRESS NOTES
Echocardiogram report requested from [facility] on 6/17 and on 6/5. Confirmation of request received. Faxed to [phone]. Echo report not received.

## 2019-06-22 ENCOUNTER — IP HISTORICAL/CONVERTED ENCOUNTER (OUTPATIENT)
Dept: OTHER | Age: 79
End: 2019-06-22

## 2019-07-05 ENCOUNTER — OFFICE VISIT (OUTPATIENT)
Dept: CARDIOLOGY CLINIC | Age: 79
End: 2019-07-05

## 2019-07-05 ENCOUNTER — CLINICAL SUPPORT (OUTPATIENT)
Dept: CARDIOLOGY CLINIC | Age: 79
End: 2019-07-05

## 2019-07-05 VITALS
BODY MASS INDEX: 30.86 KG/M2 | WEIGHT: 192 LBS | SYSTOLIC BLOOD PRESSURE: 120 MMHG | HEART RATE: 71 BPM | HEIGHT: 66 IN | DIASTOLIC BLOOD PRESSURE: 60 MMHG | OXYGEN SATURATION: 98 %

## 2019-07-05 DIAGNOSIS — Z95.810 BIVENTRICULAR ICD (IMPLANTABLE CARDIOVERTER-DEFIBRILLATOR) IN PLACE: Primary | ICD-10-CM

## 2019-07-05 NOTE — PROGRESS NOTES
Patient says he has shortness of breath when walking far   Patient says that his right ankle swells a little     Visit Vitals  /60 (BP 1 Location: Right arm, BP Patient Position: Sitting)   Pulse 71   Ht 5' 6\" (1.676 m)   Wt 192 lb (87.1 kg)   SpO2 98%   BMI 30.99 kg/m²

## 2019-07-05 NOTE — PROGRESS NOTES
HISTORY OF PRESENTING ILLNESS      Michael Sal is a [de-identified] y.o. male with history of ischemic cardiomyopathy, CAD/CABG, left ventricular ejection fraction 25-30%, moderate tricuspid regurgitation, myocardial infarction, severe peripheral vascular disease status post peripheral intervention, dyslipidemia, dual-chamber Medtronic pacemaker, hypertension, diabetes mellitus presenting for consideration for upgrade of his ICD to biventricular system.  He is currently NYHA class II-III.  He has been on optimal guideline directed medical therapy long-term.  Device interrogation recently revealed a greater than 40% ventricular pacing burden and to thus underwent upgrade of a dual-chamber pacemaker to a biventricular ICD system. He now presents for follow-up. Device interrogation reveals normal device functioning and his incision site has healed well. ACTIVE PROBLEM LIST     Patient Active Problem List    Diagnosis Date Noted    Cardiomyopathy [facility]) 05/17/2019     Priority: 1 - One    Ischemic cardiomyopathy 04/23/2019    Left bundle branch block 04/23/2019           PAST MEDICAL HISTORY     Past Medical History:   Diagnosis Date    [facility] [address] [address]     Essential hypertension     Hyperlipidemia     Myocardial infarction ([facility] [address]     Pacemaker            PAST SURGICAL HISTORY     Past Surgical History:   Procedure Laterality Date    HX CORONARY ARTERY BYPASS GRAFT      [place] INSJ/RPLCMT PERM DFB W/TRNSVNS LDS 1/DUAL CHMBR N/A 5/17/2019    pm-> ICD upgrade, RV lead, Medtronic performed by Claudio Elias MD at [facility] CATH LAB          ALLERGIES     Allergies   Allergen Reactions    Bactrim [Sulfamethoprim] Other (comments)          FAMILY HISTORY     No family history on file.  negative for cardiac disease       SOCIAL HISTORY     Social History     Socioeconomic History    Marital status:      Spouse name: Not on file    Number of children: Not on file    Years of education: Not on file  Highest education level: Not on file   Tobacco Use    Smoking status: Former Smoker    Smokeless tobacco: Never Used   Substance and Sexual Activity    Alcohol use: Not Currently         MEDICATIONS     Current Outpatient Medications   Medication Sig    linaclotide (LINZESS) 72 mcg cap Take 72 mcg by mouth daily as needed.  cyproheptadine (PERIACTIN) 4 mg tablet Take 4 mg by mouth three (3) times daily as needed.  metoprolol succinate (TOPROL-XL) 25 mg XL tablet TAKE ONE TABLET BY MOUTH ONE TIME DAILY    clopidogrel (PLAVIX) 75 mg tab TAKE ONE TABLET BY MOUTH ONE TIME DAILY    JANUVIA 100 mg tablet TAKE ONE TABLET BY MOUTH ONE TIME DAILY    ezetimibe (ZETIA) 10 mg tablet TAKE ONE TABLET BY MOUTH AT BEDTIME    ranolazine ER (RANEXA) 1,000 mg TAKE ONE TABLET BY MOUTH TWICE A DAY    levothyroxine (SYNTHROID) 88 mcg tablet TAKE ONE TABLET BY MOUTH EVERY MORNING    nateglinide (STARLIX) 120 mg tablet TAKE ONE TABLET BY MOUTH THREE TIMES A DAY    cyanocobalamin (VITAMIN B12) 1,000 mcg/mL injection INJECT 1 ML SUBCUTANEOUSLY ONCE MONTHLY    atorvastatin (LIPITOR) 40 mg tablet TAKE ONE TABLET BY MOUTH ONE TIME DAILY AT BEDTIME    ferrous sulfate 325 mg (65 mg iron) tablet TAKE ONE TABLET BY MOUTH TWICE A DAY    lisinopril (PRINIVIL, ZESTRIL) 5 mg tablet 2.5 mg daily    furosemide (LASIX) 40 mg tablet TAKE ONE TABLET BY MOUTH ONE TIME DAILY    pantoprazole (PROTONIX) 40 mg tablet TAKE ONE TABLET BY MOUTH EVERY MORNING    nitroglycerin (NITROSTAT) 0.4 mg SL tablet 0.4 mg by SubLINGual route every five (5) minutes as needed for Chest Pain. Up to 3 doses.  cholecalciferol (VITAMIN D3) 1,000 unit tablet Take  by mouth daily.  insulin detemir (LEVEMIR FLEXTOUCH U-100 INSULN SC) by SubCUTAneous route. Indications: as needed for BS > 150 once a day then take 10 units     No current facility-administered medications for this visit.         I have reviewed the nurses notes, vitals, problem list, allergy list, medical history, family, social history and medications. REVIEW OF SYMPTOMS      General: Pt denies excessive weight gain or loss. Pt is able to conduct ADL's  HEENT: Denies blurred vision, headaches, hearing loss, epistaxis and difficulty swallowing. Respiratory: Denies cough, congestion, shortness of breath, RICHEY, wheezing or stridor. Cardiovascular: Denies precordial pain, palpitations, edema or PND  Gastrointestinal: Denies poor appetite, indigestion, abdominal pain or blood in stool  Genitourinary: Denies hematuria, dysuria, increased urinary frequency  Musculoskeletal: Denies joint pain or swelling from muscles or joints  Neurologic: Denies tremor, paresthesias, headache, or sensory motor disturbance  Psychiatric: Denies confusion, insomnia, depression  Integumentray: Denies rash, itching or ulcers. Hematologic: Denies easy bruising, bleeding       PHYSICAL EXAMINATION      There were no vitals filed for this visit. General: Well developed, in no acute distress. HEENT: No jaundice, oral mucosa moist, no oral ulcers  Neck: Supple, no stiffness, no lymphadenopathy, supple  Heart:  Normal S1/S2 negative S3 or S4. Regular, no murmur, gallop or rub, no jugular venous distention  Respiratory: Clear bilaterally x 4, no wheezing or rales  Abdomen:   Soft, non-tender, bowel sounds are active.   Extremities:  No edema, normal cap refill, no cyanosis. Musculoskeletal: No clubbing, no deformities  Neuro: A&Ox3, speech clear, gait stable, cooperative, no focal neurologic deficits  Skin: Skin color is normal. No rashes or lesions.  Non diaphoretic, moist.  Vascular: 2+ pulses symmetric in all extremities       DIAGNOSTIC DATA      EKG: Vpaced       LABORATORY DATA    No results found for: WBC, HGBPOC, HGB, HGBP, HCTPOC, HCT, PHCT, RBCH, PLT, MCV, HGBEXT, HCTEXT, PLTEXT   No results found for: NA, K, CL, CO2, AGAP, GLU, BUN, CREA, BUCR, GFRAA, GFRNA, CA, TBIL, TBILI, GPT, SGOT, AP, TP, ALB, GLOB, AGRAT, ALT        ASSESSMENT      1.  Cardiomyopathy               A. Ischemic               B. NYHA class II-III               C. LVEF 25-30%   2.  Left bundle branch block  3.  Diabetes mellitus  4.  CAD, native  5.  CABG  6.  History myocardial infarction  7.  PPM--> CRTD              A. Dual-chamber--> CRTD              B. Left-sided              C. Medtronic              D. 9/2017;5/2019  8.  Hypertension  9.  Peripheral vascular disease status post intervention  10.  Diverticulosis with history of rectal bleeding         PLAN     Continue monitoring with Dr. Madden Even     1 year      Thank you, Shakira Han MD and Dr. Shannen Alexander for allowing me to participate in the care of this extraordinarily pleasant male. Please do not hesitate to contact me for further questions/concerns.          Anibal Hennessy MD  Cardiac Electrophysiology / Cardiology    [place] [address].  [facility], [facility], [address]  [phone] / [phone] Fax   [phone] / [phone] Fax

## 2019-11-19 ENCOUNTER — OP HISTORICAL/CONVERTED ENCOUNTER (OUTPATIENT)
Dept: OTHER | Age: 79
End: 2019-11-19

## 2020-03-12 ENCOUNTER — OP HISTORICAL/CONVERTED ENCOUNTER (OUTPATIENT)
Dept: OTHER | Age: 80
End: 2020-03-12

## 2020-03-25 ENCOUNTER — IP HISTORICAL/CONVERTED ENCOUNTER (OUTPATIENT)
Dept: OTHER | Age: 80
End: 2020-03-25

## 2020-08-09 ENCOUNTER — ED HISTORICAL/CONVERTED ENCOUNTER (OUTPATIENT)
Dept: OTHER | Age: 80
End: 2020-08-09

## 2020-08-14 ENCOUNTER — IP HISTORICAL/CONVERTED ENCOUNTER (OUTPATIENT)
Dept: OTHER | Age: 80
End: 2020-08-14

## 2020-08-21 ENCOUNTER — IP HISTORICAL/CONVERTED ENCOUNTER (OUTPATIENT)
Dept: OTHER | Age: 80
End: 2020-08-21

## 2020-08-25 ENCOUNTER — ED HISTORICAL/CONVERTED ENCOUNTER (OUTPATIENT)
Dept: OTHER | Age: 80
End: 2020-08-25

## 2020-09-22 ENCOUNTER — HOSPITAL ENCOUNTER (INPATIENT)
Age: 80
LOS: 23 days | Discharge: NURSING, REHAB OR LONG-TERM CARE | DRG: 871 | End: 2020-10-16
Attending: EMERGENCY MEDICINE | Admitting: HOSPITALIST
Payer: MEDICARE

## 2020-09-22 ENCOUNTER — APPOINTMENT (OUTPATIENT)
Dept: GENERAL RADIOLOGY | Age: 80
DRG: 871 | End: 2020-09-22
Attending: EMERGENCY MEDICINE
Payer: MEDICARE

## 2020-09-22 DIAGNOSIS — I73.9 PVD (PERIPHERAL VASCULAR DISEASE) (HCC): Chronic | ICD-10-CM

## 2020-09-22 DIAGNOSIS — A49.9 BACTERIAL URINARY INFECTION: ICD-10-CM

## 2020-09-22 DIAGNOSIS — R62.7 ADULT FAILURE TO THRIVE: Primary | ICD-10-CM

## 2020-09-22 DIAGNOSIS — N39.0 BACTERIAL URINARY INFECTION: ICD-10-CM

## 2020-09-22 DIAGNOSIS — L98.9 SKIN LESION OF LEFT LEG: ICD-10-CM

## 2020-09-22 LAB
ALBUMIN SERPL-MCNC: 2 G/DL (ref 3.5–5)
ALBUMIN/GLOB SERPL: 0.5 {RATIO} (ref 1.1–2.2)
ALP SERPL-CCNC: 112 U/L (ref 45–117)
ALT SERPL-CCNC: 15 U/L (ref 12–78)
ANION GAP SERPL CALC-SCNC: 8 MMOL/L (ref 5–15)
APPEARANCE UR: CLEAR
AST SERPL W P-5'-P-CCNC: 19 U/L (ref 15–37)
BACTERIA URNS QL MICRO: NEGATIVE /HPF
BASOPHILS # BLD: 0 K/UL (ref 0–0.1)
BASOPHILS NFR BLD: 1 % (ref 0–1)
BILIRUB SERPL-MCNC: 0.6 MG/DL (ref 0.2–1)
BILIRUB UR QL: NEGATIVE
BNP SERPL-MCNC: 3556 PG/ML
BUN SERPL-MCNC: 27 MG/DL (ref 6–20)
BUN/CREAT SERPL: 14 (ref 12–20)
CA-I BLD-MCNC: 9.1 MG/DL (ref 8.5–10.1)
CHLORIDE SERPL-SCNC: 100 MMOL/L (ref 97–108)
CO2 SERPL-SCNC: 28 MMOL/L (ref 21–32)
COLOR UR: ABNORMAL
CREAT SERPL-MCNC: 1.87 MG/DL (ref 0.7–1.3)
DIFFERENTIAL METHOD BLD: ABNORMAL
EOSINOPHIL # BLD: 0.1 K/UL (ref 0–0.4)
EOSINOPHIL NFR BLD: 3 % (ref 0–7)
ERYTHROCYTE [DISTWIDTH] IN BLOOD BY AUTOMATED COUNT: 16.2 % (ref 11.5–14.5)
GLOBULIN SER CALC-MCNC: 4.3 G/DL (ref 2–4)
GLUCOSE SERPL-MCNC: 209 MG/DL (ref 65–100)
GLUCOSE UR STRIP.AUTO-MCNC: NEGATIVE MG/DL
HCT VFR BLD AUTO: 34.4 % (ref 36.6–50.3)
HGB BLD-MCNC: 11.3 % (ref 12.1–17)
HGB UR QL STRIP: NEGATIVE
HYALINE CASTS URNS QL MICRO: ABNORMAL /LPF (ref 0–5)
IMM GRANULOCYTES # BLD AUTO: 0 K/UL (ref 0–0.04)
IMM GRANULOCYTES NFR BLD AUTO: 0 % (ref 0–0.5)
KETONES UR QL STRIP.AUTO: NEGATIVE MG/DL
LEUKOCYTE ESTERASE UR QL STRIP.AUTO: ABNORMAL
LIPASE SERPL-CCNC: 129 U/L (ref 73–393)
LYMPHOCYTES # BLD: 0.4 K/UL (ref 0.8–3.5)
LYMPHOCYTES NFR BLD: 8 % (ref 12–49)
MCH RBC QN AUTO: 27.5 PG (ref 26–34)
MCHC RBC AUTO-ENTMCNC: 32.8 G/DL (ref 30–36.5)
MCV RBC AUTO: 83.7 FL (ref 80–99)
MONOCYTES # BLD: 0.4 K/UL (ref 0–1)
MONOCYTES NFR BLD: 8 % (ref 5–13)
MUCOUS THREADS URNS QL MICRO: ABNORMAL /LPF
NEUTS SEG # BLD: 4.4 K/UL (ref 1.8–8)
NEUTS SEG NFR BLD: 81 % (ref 32–75)
NITRITE UR QL STRIP.AUTO: NEGATIVE
NRBC # BLD: 0 K/UL (ref 0–0.01)
NRBC BLD-RTO: 0 PER 100 WBC
PH UR STRIP: 6 [PH] (ref 5–8)
PLATELET # BLD AUTO: 299 K/UL (ref 150–400)
PMV BLD AUTO: 11.3 FL (ref 8.9–12.9)
POTASSIUM SERPL-SCNC: 4.3 MMOL/L (ref 3.5–5.1)
PROT SERPL-MCNC: 6.3 G/DL (ref 6.4–8.2)
PROT UR STRIP-MCNC: NEGATIVE MG/DL
RBC # BLD AUTO: 4.11 M/UL (ref 4.1–5.7)
RBC #/AREA URNS HPF: ABNORMAL /HPF (ref 0–5)
SODIUM SERPL-SCNC: 136 MMOL/L (ref 136–145)
SP GR UR REFRACTOMETRY: 1.02 (ref 1–1.03)
TROPONIN I SERPL-MCNC: <0.05 NG/ML
UROBILINOGEN UR QL STRIP.AUTO: 4 EU/DL (ref 0.1–1)
WBC # BLD AUTO: 5.5 K/UL (ref 4.1–11.1)
WBC URNS QL MICRO: ABNORMAL /HPF (ref 0–4)

## 2020-09-22 PROCEDURE — 96365 THER/PROPH/DIAG IV INF INIT: CPT

## 2020-09-22 PROCEDURE — 36415 COLL VENOUS BLD VENIPUNCTURE: CPT

## 2020-09-22 PROCEDURE — 85025 COMPLETE CBC W/AUTO DIFF WBC: CPT

## 2020-09-22 PROCEDURE — 83880 ASSAY OF NATRIURETIC PEPTIDE: CPT

## 2020-09-22 PROCEDURE — 74011000258 HC RX REV CODE- 258: Performed by: EMERGENCY MEDICINE

## 2020-09-22 PROCEDURE — 99285 EMERGENCY DEPT VISIT HI MDM: CPT

## 2020-09-22 PROCEDURE — 83690 ASSAY OF LIPASE: CPT

## 2020-09-22 PROCEDURE — 93005 ELECTROCARDIOGRAM TRACING: CPT

## 2020-09-22 PROCEDURE — 71045 X-RAY EXAM CHEST 1 VIEW: CPT

## 2020-09-22 PROCEDURE — 81001 URINALYSIS AUTO W/SCOPE: CPT

## 2020-09-22 PROCEDURE — 80053 COMPREHEN METABOLIC PANEL: CPT

## 2020-09-22 PROCEDURE — 74011250636 HC RX REV CODE- 250/636: Performed by: INTERNAL MEDICINE

## 2020-09-22 PROCEDURE — 74011250636 HC RX REV CODE- 250/636: Performed by: EMERGENCY MEDICINE

## 2020-09-22 PROCEDURE — 96375 TX/PRO/DX INJ NEW DRUG ADDON: CPT

## 2020-09-22 PROCEDURE — 84484 ASSAY OF TROPONIN QUANT: CPT

## 2020-09-22 RX ORDER — FUROSEMIDE 10 MG/ML
40 INJECTION INTRAMUSCULAR; INTRAVENOUS ONCE
Status: COMPLETED | OUTPATIENT
Start: 2020-09-22 | End: 2020-09-22

## 2020-09-22 RX ADMIN — CEFTRIAXONE SODIUM 1 G: 1 INJECTION, POWDER, FOR SOLUTION INTRAMUSCULAR; INTRAVENOUS at 23:40

## 2020-09-22 RX ADMIN — FUROSEMIDE 40 MG: 10 INJECTION, SOLUTION INTRAMUSCULAR; INTRAVENOUS at 23:40

## 2020-09-22 NOTE — CONSULTS
Consult    Patient: Calvin Kim MRN: [id number]  SSN: [id number]    YOB: 1940  Age: 79 y.o. Sex: male      Subjective:      Calvin Kim is a 79 y.o. male who is being seen for congestive heart failure . He was recently treated for heart failure decompensation. He was having some chest pain back then and his troponin were in the indeterminate range. He was discharged to rehab facility. He has been having bilateral lower limb swelling and bullous formation. Denied having any nausea or vomiting but has been having some intermittent chest pain at rest not necessarily related to exercise or activity. He has been having more shortness of breath. He has been compliant with his medication. Denied recent falls. Past Medical History:   Diagnosis Date    Diabetes ([facility] [address]     Essential hypertension     Hyperlipidemia     Myocardial infarction [facility])     Pacemaker      Past Surgical History:   Procedure Laterality Date    HX CORONARY ARTERY BYPASS GRAFT      [place] INSJ/RPLCMT PERM DFB W/TRNSVNS LDS 1/DUAL CHMBR N/A 5/17/2019    pm-> ICD upgrade, RV lead, Medtronic performed by Michael Ruano MD at [facility] CATH LAB      No family history on file.   Social History     Tobacco Use    Smoking status: Former Smoker    Smokeless tobacco: Never Used   Substance Use Topics    Alcohol use: Not Currently      Current Facility-Administered Medications   Medication Dose Route Frequency Provider Last Rate Last Dose    furosemide (LASIX) injection 40 mg  40 mg IntraVENous ONCE Shani Baker MD         Current Outpatient Medications   Medication Sig Dispense Refill    metoprolol succinate (TOPROL-XL) 25 mg XL tablet TAKE ONE TABLET BY MOUTH ONE TIME DAILY  4    clopidogrel (PLAVIX) 75 mg tab TAKE ONE TABLET BY MOUTH ONE TIME DAILY  3    JANUVIA 100 mg tablet TAKE ONE TABLET BY MOUTH ONE TIME DAILY  0    ezetimibe (ZETIA) 10 mg tablet TAKE ONE TABLET BY MOUTH AT BEDTIME  1    ranolazine ER (RANEXA) 1,000 mg TAKE ONE TABLET BY MOUTH TWICE A DAY  4    levothyroxine (SYNTHROID) 88 mcg tablet TAKE ONE TABLET BY MOUTH EVERY MORNING  1    cyanocobalamin (VITAMIN B12) 1,000 mcg/mL injection INJECT 1 ML SUBCUTANEOUSLY ONCE MONTHLY  5    atorvastatin (LIPITOR) 40 mg tablet TAKE ONE TABLET BY MOUTH ONE TIME DAILY AT BEDTIME  1    ferrous sulfate 325 mg (65 mg iron) tablet TAKE ONE TABLET BY MOUTH TWICE A DAY  1    lisinopril (PRINIVIL, ZESTRIL) 5 mg tablet 2.5 mg daily  3    furosemide (LASIX) 40 mg tablet TAKE ONE TABLET BY MOUTH ONE TIME DAILY  1    pantoprazole (PROTONIX) 40 mg tablet TAKE ONE TABLET BY MOUTH EVERY MORNING  1    nitroglycerin (NITROSTAT) 0.4 mg SL tablet 0.4 mg by SubLINGual route every five (5) minutes as needed for Chest Pain. Up to 3 doses.  cholecalciferol (VITAMIN D3) 1,000 unit tablet Take  by mouth daily.  insulin detemir (LEVEMIR FLEXTOUCH U-100 INSULN SC) by SubCUTAneous route. Indications: as needed for BS > 150 once a day then take 10 units          Allergies   Allergen Reactions    Bactrim [Sulfamethoprim] Other (comments)       Review of Systems:  A comprehensive review of systems was negative except for that written in the History of Present Illness. Objective:     Vitals:    09/22/20 1647   BP: (!) 94/50   Pulse: 66   Resp: 18   Temp: 97.8 °F (36.6 °C)   Weight: 83.9 kg (185 lb)   Height: 5' 6\" (1.676 m)        Physical Exam:  General:  Alert, cooperative, no distress, appears stated age. Eyes:  Conjunctivae/corneas clear. PERRL, EOMs intact. Fundi benign   Ears:  Normal TMs and external ear canals both ears. Nose: Nares normal. Septum midline. Mucosa normal. No drainage or sinus tenderness. Mouth/Throat: Lips, mucosa, and tongue normal. Teeth and gums normal.   Neck: Supple, symmetrical, trachea midline, no adenopathy, thyroid: no enlargment/tenderness/nodules, no carotid bruit and no JVD. Back:   Symmetric, no curvature.  ROM normal. No CVA tenderness. Lungs:   Clear to auscultation bilaterally. Heart:  Regular rate and rhythm, S1, S2 normal, no murmur, click, rub or gallop. Abdomen:   Soft, non-tender. Bowel sounds normal. No masses,  No organomegaly. Extremities:  Bilateral lower limb swelling. Pulses: 2+ and symmetric all extremities. Skin: Skin color, texture, turgor normal. No rashes or lesions   Lymph nodes: Cervical, supraclavicular, and axillary nodes normal.   Neurologic: CNII-XII intact. Normal strength, sensation and reflexes throughout. Assessment:     Hospital Problems  Date Reviewed: 7/5/2019    None        Mr. Naldo Chin is an 79-year-old -American male with:   1. 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 [place] September 2017   2. Chest pain atypical for angina   3. Coronary artery disease (previous MI and status post coronary CABG)   4. Hypertension with hypertensive heart disease   5. Mixed Dyslipidemia     6. Peripheral vessel disease   7. Type 2 diabetes mellitus   8. Former tobacco use   9. History of thyroid disease   10. Moderate pulmonary hypertension, RVSP 57 mmHg   11. History of bleeding per rectum and moderately severe diverticulosis of the descending colon and sigmoid colon. Plan:   Patient was seen and examined in the emergency room. Patient with bilateral lower limb swelling. He was recently discharged after he was treated for heart failure decompensation. Agree to admit on telemetry we will start IV Lasix. Patient with left lower extremity bollus. If this does not improve by tomorrow then will have wound care and surgeon probably open the bollus. Patient on Ranexa 1000 mg twice a day, lisinopril 2.5 mg daily, Lasix orally but we will switch to IV Lasix. Continue Lipitor and Plavix. We will trend 3 sets of cardiac markers. Echo recently showed EF of 50 to 55% with grade 1 diastolic function with mild LVH.   There was a mild mitral regurgitation. RVSP 35 mmHg. Thank you  following Mr. Gee Pagan rosana. I will follow.     Signed By: Gokul Viera MD     September 22, 2020

## 2020-09-23 ENCOUNTER — APPOINTMENT (OUTPATIENT)
Dept: GENERAL RADIOLOGY | Age: 80
DRG: 871 | End: 2020-09-23
Attending: INTERNAL MEDICINE
Payer: MEDICARE

## 2020-09-23 ENCOUNTER — APPOINTMENT (OUTPATIENT)
Dept: ULTRASOUND IMAGING | Age: 80
DRG: 871 | End: 2020-09-23
Attending: HOSPITALIST
Payer: MEDICARE

## 2020-09-23 PROBLEM — I50.9 ACUTE EXACERBATION OF CHF (CONGESTIVE HEART FAILURE) (HCC): Status: ACTIVE | Noted: 2020-09-23

## 2020-09-23 PROBLEM — R62.7 ADULT FAILURE TO THRIVE: Status: ACTIVE | Noted: 2020-09-23

## 2020-09-23 PROBLEM — I50.9 HEART FAILURE (HCC): Status: ACTIVE | Noted: 2020-09-23

## 2020-09-23 PROBLEM — R11.2 NAUSEA & VOMITING: Status: ACTIVE | Noted: 2020-09-23

## 2020-09-23 PROBLEM — I95.9 HYPOTENSION: Status: ACTIVE | Noted: 2020-09-23

## 2020-09-23 LAB
CULTURE,CULT: NORMAL
GLUCOSE BLD STRIP.AUTO-MCNC: 176 MG/DL (ref 65–100)
GLUCOSE BLD STRIP.AUTO-MCNC: 222 MG/DL (ref 65–100)
GLUCOSE BLD STRIP.AUTO-MCNC: 255 MG/DL (ref 65–100)
PERFORMED BY, TECHID: ABNORMAL
SARS-COV-2, COV2: NORMAL
SPECIAL REQUESTS,SREQ: NORMAL

## 2020-09-23 PROCEDURE — 74011250636 HC RX REV CODE- 250/636: Performed by: INTERNAL MEDICINE

## 2020-09-23 PROCEDURE — 74011250637 HC RX REV CODE- 250/637: Performed by: HOSPITALIST

## 2020-09-23 PROCEDURE — 87086 URINE CULTURE/COLONY COUNT: CPT

## 2020-09-23 PROCEDURE — 74011636637 HC RX REV CODE- 636/637: Performed by: HOSPITALIST

## 2020-09-23 PROCEDURE — 74011250636 HC RX REV CODE- 250/636: Performed by: PHYSICIAN ASSISTANT

## 2020-09-23 PROCEDURE — 82962 GLUCOSE BLOOD TEST: CPT

## 2020-09-23 PROCEDURE — 77010033678 HC OXYGEN DAILY

## 2020-09-23 PROCEDURE — 02HV33Z INSERTION OF INFUSION DEVICE INTO SUPERIOR VENA CAVA, PERCUTANEOUS APPROACH: ICD-10-PCS | Performed by: INTERNAL MEDICINE

## 2020-09-23 PROCEDURE — 71045 X-RAY EXAM CHEST 1 VIEW: CPT

## 2020-09-23 PROCEDURE — 74011250637 HC RX REV CODE- 250/637: Performed by: PHYSICIAN ASSISTANT

## 2020-09-23 PROCEDURE — 0HDNXZZ EXTRACTION OF LEFT FOOT SKIN, EXTERNAL APPROACH: ICD-10-PCS | Performed by: SURGERY

## 2020-09-23 PROCEDURE — 65610000006 HC RM INTENSIVE CARE

## 2020-09-23 PROCEDURE — 76700 US EXAM ABDOM COMPLETE: CPT

## 2020-09-23 PROCEDURE — 87186 SC STD MICRODIL/AGAR DIL: CPT

## 2020-09-23 PROCEDURE — 74011000250 HC RX REV CODE- 250: Performed by: INTERNAL MEDICINE

## 2020-09-23 PROCEDURE — 87635 SARS-COV-2 COVID-19 AMP PRB: CPT

## 2020-09-23 PROCEDURE — 87077 CULTURE AEROBIC IDENTIFY: CPT

## 2020-09-23 RX ORDER — LEVOTHYROXINE SODIUM 88 UG/1
88 TABLET ORAL
Status: DISCONTINUED | OUTPATIENT
Start: 2020-09-23 | End: 2020-10-16 | Stop reason: HOSPADM

## 2020-09-23 RX ORDER — ACETAMINOPHEN 325 MG/1
650 TABLET ORAL
Status: DISCONTINUED | OUTPATIENT
Start: 2020-09-23 | End: 2020-10-16 | Stop reason: HOSPADM

## 2020-09-23 RX ORDER — LISINOPRIL 5 MG/1
5 TABLET ORAL DAILY
Status: DISCONTINUED | OUTPATIENT
Start: 2020-09-23 | End: 2020-10-02

## 2020-09-23 RX ORDER — LANOLIN ALCOHOL/MO/W.PET/CERES
1 CREAM (GRAM) TOPICAL
Status: DISCONTINUED | OUTPATIENT
Start: 2020-09-23 | End: 2020-10-16 | Stop reason: HOSPADM

## 2020-09-23 RX ORDER — INSULIN GLARGINE 100 [IU]/ML
10 INJECTION, SOLUTION SUBCUTANEOUS
Status: DISCONTINUED | OUTPATIENT
Start: 2020-09-23 | End: 2020-09-23

## 2020-09-23 RX ORDER — OXYCODONE HYDROCHLORIDE 5 MG/1
5 TABLET ORAL
Status: DISCONTINUED | OUTPATIENT
Start: 2020-09-23 | End: 2020-10-10

## 2020-09-23 RX ORDER — FUROSEMIDE 40 MG/1
40 TABLET ORAL DAILY
Status: DISCONTINUED | OUTPATIENT
Start: 2020-09-23 | End: 2020-10-10

## 2020-09-23 RX ORDER — EZETIMIBE 10 MG/1
10 TABLET ORAL DAILY
Status: DISCONTINUED | OUTPATIENT
Start: 2020-09-23 | End: 2020-10-16 | Stop reason: HOSPADM

## 2020-09-23 RX ORDER — NOREPINEPHRINE BITARTRATE/D5W 8 MG/250ML
5 PLASTIC BAG, INJECTION (ML) INTRAVENOUS
Status: DISCONTINUED | OUTPATIENT
Start: 2020-09-23 | End: 2020-10-01

## 2020-09-23 RX ORDER — RANOLAZINE 500 MG/1
500 TABLET, EXTENDED RELEASE ORAL 2 TIMES DAILY
Status: DISCONTINUED | OUTPATIENT
Start: 2020-09-23 | End: 2020-10-16 | Stop reason: HOSPADM

## 2020-09-23 RX ORDER — DEXTROSE 50 % IN WATER (D50W) INTRAVENOUS SYRINGE
25-50 AS NEEDED
Status: DISCONTINUED | OUTPATIENT
Start: 2020-09-23 | End: 2020-10-16 | Stop reason: HOSPADM

## 2020-09-23 RX ORDER — MORPHINE SULFATE 2 MG/ML
2 INJECTION, SOLUTION INTRAMUSCULAR; INTRAVENOUS
Status: DISPENSED | OUTPATIENT
Start: 2020-09-23 | End: 2020-09-25

## 2020-09-23 RX ORDER — INSULIN LISPRO 100 [IU]/ML
INJECTION, SOLUTION INTRAVENOUS; SUBCUTANEOUS
Status: DISCONTINUED | OUTPATIENT
Start: 2020-09-23 | End: 2020-10-16 | Stop reason: HOSPADM

## 2020-09-23 RX ORDER — MAGNESIUM SULFATE 100 %
4 CRYSTALS MISCELLANEOUS AS NEEDED
Status: DISCONTINUED | OUTPATIENT
Start: 2020-09-23 | End: 2020-10-10 | Stop reason: SDUPTHER

## 2020-09-23 RX ORDER — MELATONIN
1000 DAILY
Status: DISCONTINUED | OUTPATIENT
Start: 2020-09-23 | End: 2020-10-16 | Stop reason: HOSPADM

## 2020-09-23 RX ORDER — PANTOPRAZOLE SODIUM 40 MG/1
40 TABLET, DELAYED RELEASE ORAL
Status: DISCONTINUED | OUTPATIENT
Start: 2020-09-23 | End: 2020-10-16 | Stop reason: HOSPADM

## 2020-09-23 RX ORDER — METOPROLOL SUCCINATE 25 MG/1
25 TABLET, EXTENDED RELEASE ORAL DAILY
Status: DISCONTINUED | OUTPATIENT
Start: 2020-09-23 | End: 2020-10-02

## 2020-09-23 RX ORDER — CLOPIDOGREL BISULFATE 75 MG/1
75 TABLET ORAL DAILY
Status: DISCONTINUED | OUTPATIENT
Start: 2020-09-23 | End: 2020-10-16 | Stop reason: HOSPADM

## 2020-09-23 RX ORDER — BISMUTH SUBSALICYLATE 262 MG
1 TABLET,CHEWABLE ORAL DAILY
Status: DISCONTINUED | OUTPATIENT
Start: 2020-09-23 | End: 2020-10-16 | Stop reason: HOSPADM

## 2020-09-23 RX ORDER — ATORVASTATIN CALCIUM 40 MG/1
40 TABLET, FILM COATED ORAL
Status: DISCONTINUED | OUTPATIENT
Start: 2020-09-23 | End: 2020-10-11

## 2020-09-23 RX ORDER — DOBUTAMINE HYDROCHLORIDE 200 MG/100ML
10 INJECTION INTRAVENOUS CONTINUOUS
Status: DISCONTINUED | OUTPATIENT
Start: 2020-09-23 | End: 2020-10-01

## 2020-09-23 RX ORDER — ENOXAPARIN SODIUM 100 MG/ML
30 INJECTION SUBCUTANEOUS EVERY 24 HOURS
Status: DISCONTINUED | OUTPATIENT
Start: 2020-09-23 | End: 2020-09-24

## 2020-09-23 RX ADMIN — SITAGLIPTIN 50 MG: 100 TABLET, FILM COATED ORAL at 10:28

## 2020-09-23 RX ADMIN — DOBUTAMINE IN DEXTROSE 5 MCG/KG/MIN: 200 INJECTION, SOLUTION INTRAVENOUS at 13:18

## 2020-09-23 RX ADMIN — MORPHINE SULFATE 2 MG: 2 INJECTION, SOLUTION INTRAMUSCULAR; INTRAVENOUS at 23:51

## 2020-09-23 RX ADMIN — FERROUS SULFATE TAB 325 MG (65 MG ELEMENTAL FE) 325 MG: 325 (65 FE) TAB at 09:46

## 2020-09-23 RX ADMIN — EZETIMIBE 10 MG: 10 TABLET ORAL at 10:28

## 2020-09-23 RX ADMIN — DOBUTAMINE IN DEXTROSE 10 MCG/KG/MIN: 200 INJECTION, SOLUTION INTRAVENOUS at 22:36

## 2020-09-23 RX ADMIN — RENO CAPS 1 CAPSULE: 100; 1.5; 1.7; 20; 10; 1; 150; 5; 6 CAPSULE ORAL at 10:28

## 2020-09-23 RX ADMIN — INSULIN LISPRO 2 UNITS: 100 INJECTION, SOLUTION INTRAVENOUS; SUBCUTANEOUS at 13:17

## 2020-09-23 RX ADMIN — SODIUM CHLORIDE 500 ML: 9 INJECTION, SOLUTION INTRAVENOUS at 15:00

## 2020-09-23 RX ADMIN — LISINOPRIL 5 MG: 10 TABLET ORAL at 09:46

## 2020-09-23 RX ADMIN — Medication 1 TABLET: at 10:29

## 2020-09-23 RX ADMIN — Medication 11 MCG/MIN: at 17:32

## 2020-09-23 RX ADMIN — ATORVASTATIN CALCIUM 40 MG: 40 TABLET, FILM COATED ORAL at 21:57

## 2020-09-23 RX ADMIN — Medication 5 MCG/MIN: at 15:00

## 2020-09-23 RX ADMIN — CLOPIDOGREL BISULFATE 75 MG: 75 TABLET ORAL at 09:47

## 2020-09-23 RX ADMIN — INSULIN LISPRO 3 UNITS: 100 INJECTION, SOLUTION INTRAVENOUS; SUBCUTANEOUS at 09:48

## 2020-09-23 RX ADMIN — METOPROLOL SUCCINATE 25 MG: 25 TABLET, EXTENDED RELEASE ORAL at 09:47

## 2020-09-23 RX ADMIN — INSULIN LISPRO 3 UNITS: 100 INJECTION, SOLUTION INTRAVENOUS; SUBCUTANEOUS at 21:57

## 2020-09-23 RX ADMIN — Medication 8 MCG/MIN: at 15:47

## 2020-09-23 RX ADMIN — OXYCODONE HYDROCHLORIDE 5 MG: 5 TABLET ORAL at 21:57

## 2020-09-23 RX ADMIN — FUROSEMIDE 40 MG: 40 TABLET ORAL at 09:45

## 2020-09-23 RX ADMIN — SODIUM CHLORIDE 250 ML: 9 INJECTION, SOLUTION INTRAVENOUS at 13:46

## 2020-09-23 RX ADMIN — RANOLAZINE 500 MG: 500 TABLET, FILM COATED, EXTENDED RELEASE ORAL at 10:28

## 2020-09-23 RX ADMIN — Medication 10.5 MCG/MIN: at 19:10

## 2020-09-23 RX ADMIN — PANTOPRAZOLE SODIUM 40 MG: 40 TABLET, DELAYED RELEASE ORAL at 09:45

## 2020-09-23 RX ADMIN — MULTIVITAMIN TABLET 1 TABLET: TABLET at 13:30

## 2020-09-23 NOTE — PROGRESS NOTES
Progress Note  Date:2020       Room:[place] [address]  Patient Ralph Tobin     YOB: 1940     Age:80 y.o. Subjective    Subjective:  Symptoms:  Stable. He reports shortness of breath and weakness. Diet:  Poor intake. Activity level: Impaired due to weakness. Pain:  He complains of pain that is moderate. He reports pain is unchanged. Pain is partially controlled. Review of Systems   Constitutional: Positive for appetite change and fatigue. Negative for fever. Respiratory: Positive for shortness of breath. Cardiovascular: Positive for leg swelling. Neurological: Positive for weakness. Objective         Vitals Last 24 Hours:  TEMPERATURE:  Temp  Av.9 °F (36.6 °C)  Min: 97.8 °F (36.6 °C)  Max: 98 °F (36.7 °C)  RESPIRATIONS RANGE: Resp  Av.2  Min: 18  Max: 28  PULSE OXIMETRY RANGE: SpO2  Av.7 %  Min: 89 %  Max: 99 %  PULSE RANGE: Pulse  Av.1  Min: 66  Max: 75  BLOOD PRESSURE RANGE: Systolic (86IBN), LTK:23 , Min:58 , XCO:874   ; Diastolic (69STN), UAL:86, Min:31, Max:108    I/O (24Hr): Intake/Output Summary (Last 24 hours) at 2020 1531  Last data filed at 2020 1401  Gross per 24 hour   Intake 300 ml   Output    Net 300 ml     Objective:  General Appearance: In no acute distress. Vital signs: (most recent): Blood pressure (!) 71/39, pulse 73, temperature 97.9 °F (36.6 °C), resp. rate 22, height 5' 6\" (1.676 m), weight 83.9 kg (185 lb), SpO2 99 %. No fever. Output: Producing urine. HEENT: Normal HEENT exam.    Lungs: Tachypnea. Breath sounds clear to auscultation. Heart: Normal rate. Regular rhythm. S1 normal and S2 normal.    Abdomen: Abdomen is soft and distended. Bowel sounds are normal.   There is no abdominal tenderness. Extremities: Normal range of motion. There is local swelling. Neurological: Patient is alert and oriented to person, place and time.     Pupils:  Pupils are equal, round, and reactive to light. Skin:  Warm and dry. (Wound noted to left lower leg. Pink granulated tissue noted )    Labs/Imaging/Diagnostics    Labs:  CBC:  Recent Labs     09/22/20 2130   WBC 5.5   RBC 4.11   HGB 11.3*   HCT 34.4*   MCV 83.7   RDW 16.2*        CHEMISTRIES:  Recent Labs     09/22/20 2130      K 4.3      CO2 28   BUN 27*   CA 9.1   PT/INR:No results for input(s): INR, INREXT in the last 72 hours. No lab exists for component: PROTIME  APTT:No results for input(s): APTT in the last 72 hours. LIVER PROFILE:  Recent Labs     09/22/20 2130   AST 19   ALT 15     Lab Results   Component Value Date/Time    ALT (SGPT) 15 09/22/2020 09:30 PM    AST (SGOT) 19 09/22/2020 09:30 PM    Alk. phosphatase 112 09/22/2020 09:30 PM    Bilirubin, total 0.6 09/22/2020 09:30 PM       Imaging Last 24 Hours:  Us Abd Comp    Result Date: 9/23/2020  Ascites with significant abdominal distention. Comparison CT abdomen pelvis 8/13/2020 images without report available from PACS archive at this time. FINDINGS: Transabdominal ultrasound. Visualized portions of pancreas unremarkable. Proximal IVC are normal grayscale. Abdominal aorta obscured by bowel gas. Liver normal size, and echotexture. No identified intrahepatic mass. The biliary system appears echogenic. The portal vein normal flow directionality. Right kidney 11.4 cm long axis. Left kidney 10.8 cm long axis. No hydronephrosis, evident renal stone or solid mass. Gallbladder collapsed, without identified stones, sludge, wall thickening. Negative sonographic De Santiago sign. Common bile duct nondilated 0.3 cm. Spleen normal size and echotexture. No ascites. IMPRESSION: 1. Echogenic portal triads can be seen with hepatitis. Normal hepatic size. 2. No ascites.      Xr Chest Port    Result Date: 9/22/2020  Exam: Frontal chest History: Hypotension Comparison: 11/19/2019 Number of views: 1 Findings: Transvenous cardiac electrode leads radiographically continuous and in apparent satisfactory position. Status post remote coronary artery bypass graft. Mild cardiomegaly. Likely small bilateral pleural effusions. No developing pulmonary parenchymal lesion. Impression: No evidence of acute intrathoracic lesion    Assessment//Plan     Hypotension (9/23/2020) -  Dobutamine started at 5mcg and increased to 10mcg without improvement in bp . Norepi started secondary to low BP. S/p 750ml  IV fluids. Hold PO anti-hypertensive meds. To be restarted as needed. Cardiology following. Acute exacerbation of CHF (congestive heart failure) ([facility] [address] (9/23/2020) -  Obtain Echo to eval LVEF. Continue Lasix 40mg BID. Monitor respiratory status for compromise. Strict I's&O's. Cardiology following     Heart failure ([facility] [address] (9/23/2020) - Patient presents with SOB, tachypnea previously diagnosed with CHF. Lower extremity swelling. Cardiology following. Echo ordered. Continue Lasix 40mg BID. Monitor respiratory status for compromise. Strict I's&O's    Pressure Injury - Hx of PVD presents with blister which has been increasing in size. Wound Care Consulted. Pain Management. S/p bedside I&D, continue daily dressing change. Adult failure to thrive (9/23/2020) - Poor PO intake reported from family. Nutrition Consult. Nutritional supplements. Nausea & vomiting (9/23/2020) - Zofran as needed. Gentle IV fluid rehydration. DM Type II - Check Glucose ACHS, hold diabetic meds secondary to poor PO intake. To restart as needed. Plan:  Transfer to ICU for BP Management with pressors  Central line placement   Continue lasix for diuresis   Encourage PO intake   Case Management for disposition planning.     Obtain echo  CBC, BMP in am    Electronically signed by Juli Dumont NP on 9/23/2020 at 3:31 PM

## 2020-09-23 NOTE — BRIEF OP NOTE
Brief Postoperative Note    Patient: Lori Easley  YOB: 1940  MRN: [id number]    Date of Procedure: 9/23/2020    Pre-Op Diagnosis: Blister left lower leg    Post-Op Diagnosis: Same          Procedure debridement of left lower leg    Surgical Assistant: None    Anesthesia: None    Estimated Blood Loss (mL): None    Complications: None    Specimens: None    Implants: * No surgical log found *    Drains: * No LDAs found *    Findings: The 3 x 4 inch blistered area left lower leg was sharply debrided. The base of it looked healthy. I dressed it with Neosporin, nonadherent dressing and 4 x 4's.     Electronically Signed by You Laura MD on 9/23/2020 at 8:29 AM

## 2020-09-23 NOTE — ED NOTES
TRANSFER - OUT REPORT:    Verbal report given to Tone(name) on Sixto Fonseca  being transferred to [facility](unit) for routine progression of care       Report consisted of patients Situation, Background, Assessment and   Recommendations(SBAR). Information from the following report(s) SBAR, ED Summary, Procedure Summary, MAR, Recent Results and Cardiac Rhythm av was reviewed with the receiving nurse. Lines:   Triple Lumen Central Line 09/23/20 Anterior;Right Neck (Active)   Central Line Being Utilized No 09/23/20 1738   Criteria for Appropriate Use Hemodynamically unstable, requiring monitoring lines, vasopressors, or volume resuscitation 09/23/20 1738   Site Assessment Clean, dry, & intact 09/23/20 1738   Infiltration Assessment 0 09/23/20 1738   Dressing Status Clean, dry, & intact 09/23/20 1738       Peripheral IV 09/22/20 Posterior;Right Hand (Active)   Site Assessment Clean, dry, & intact 09/23/20 1024   Phlebitis Assessment 0 09/23/20 1024   Infiltration Assessment 0 09/23/20 1024   Dressing Status Clean, dry, & intact 09/23/20 1024   Dressing Type Tape 09/23/20 1024   Alcohol Cap Used Yes 09/22/20 2333       Peripheral IV 09/23/20 (Active)       Peripheral IV 09/23/20 Anterior; Left Forearm (Active)   Site Assessment Clean, dry, & intact 09/23/20 1739   Phlebitis Assessment 0 09/23/20 1739   Infiltration Assessment 0 09/23/20 1739   Dressing Status Clean, dry, & intact 09/23/20 1739   Hub Color/Line Status Pink 09/23/20 1739        Opportunity for questions and clarification was provided.       Patient transported with:   Monitor  O2 @ 3 liters  Registered Nurse  [facility]

## 2020-09-23 NOTE — ED NOTES
This RN called Dr. Mazin Leblanc to report patient BP is declining further after being on IV meds for an hour now. This RN was given verbal orders to put patient on levophed and call PICC team to insert PICC line. This RN called PICC team at [phone] and had to leave a message.

## 2020-09-23 NOTE — ED NOTES
This RN notified Dr. Victor Hugo Nugent of new hypotension onset for patient. Dr. Victor Hugo Nugent came to bedside to assist patient - current BP was 77/52. Was given verbal orders to not give a bolus at this time and to give 420mcg of dobutamine instead. Patient is asymptomatic to hypotension. BG is 172 @ 1310. Will continue to monitor.

## 2020-09-23 NOTE — H&P
History and Physical            History and Physical    Subjective:   Chief Complaint : Shortness of breath with history of heart failure  Source of information : Patient, medical records. Emergency room provider. History of present illness:   79 y.o. male with a history of diabetes, ischemic cardiomyopathy, hypertension presents to the emergency room from [address] [place] [address] with complaining of increasing shortness of breath, blood on his lower extremity. He is feeling very weak and tired with no energy. He has a bullous formation on left lower extremity just above the ankle, it is very big filled with fluid. Denies any injury, he noticed it more than a week ago, it is increasing size. No associated pain in that area. Denies any chest pain. States short of breath easily even with little exertion. He is trying to walk with the physical therapy but states his legs are giving up. Denies any fever or chills. He denies any wounds on the back, admits the weakness is not improving. Fatigue is the main complaint. Past Medical History:   Diagnosis Date    Diabetes ([facility]     Essential hypertension     Hyperlipidemia     Myocardial infarction ([facility]     Pacemaker    Severe peripheral vascular disease  Ischemic cardiomyopathy with previously ejection fraction at 20 to 25%, has a defibrillator. On recent echocardiogram improved to 50%, on cardiac catheterization in March found with 40 to 45% ejection fraction. Past Surgical History:   Procedure Laterality Date    HX CORONARY ARTERY BYPASS GRAFT      DE INSJ/RPLCMT PERM DFB W/TRNSVNS LDS 1/DUAL CHMBR N/A 5/17/2019    pm-> ICD upgrade, RV lead, [employer] performed by Vipin Lim MD at [facility] CATH LAB     Family History   Problem Relation Age of Onset    No Known Problems Mother     No Known Problems Father    He is not able to give information about the family.     Social History     Tobacco Use    Smoking status: Former Smoker    Smokeless tobacco: Never Used   Substance Use Topics    Alcohol use: Not Currently   Before last hospitalization living at home with the children. Prior to Admission medications    Medication Sig Start Date End Date Taking? Authorizing Provider   metoprolol succinate (TOPROL-XL) 25 mg XL tablet TAKE ONE TABLET BY MOUTH ONE TIME DAILY 4/2/19  Yes Provider, Historical   clopidogrel (PLAVIX) 75 mg tab TAKE ONE TABLET BY MOUTH ONE TIME DAILY 4/2/19  Yes Provider, Historical   JANUVIA 100 mg tablet TAKE ONE TABLET BY MOUTH ONE TIME DAILY 4/11/19  Yes Provider, Historical   ezetimibe (ZETIA) 10 mg tablet TAKE ONE TABLET BY MOUTH AT BEDTIME 4/18/19  Yes Provider, Historical   ranolazine ER (RANEXA) 1,000 mg TAKE ONE TABLET BY MOUTH TWICE A DAY 4/18/19  Yes Provider, Historical   levothyroxine (SYNTHROID) 88 mcg tablet TAKE ONE TABLET BY MOUTH EVERY MORNING 3/25/19  Yes Provider, Historical   cyanocobalamin (VITAMIN B12) 1,000 mcg/mL injection INJECT 1 ML SUBCUTANEOUSLY ONCE MONTHLY 3/25/19  Yes Provider, Historical   atorvastatin (LIPITOR) 40 mg tablet TAKE ONE TABLET BY MOUTH ONE TIME DAILY AT BEDTIME 3/21/19  Yes Provider, Historical   ferrous sulfate 325 mg (65 mg iron) tablet TAKE ONE TABLET BY MOUTH TWICE A DAY 3/21/19  Yes Provider, Historical   lisinopril (PRINIVIL, ZESTRIL) 5 mg tablet 2.5 mg daily 3/5/19  Yes Provider, Historical   furosemide (LASIX) 40 mg tablet TAKE ONE TABLET BY MOUTH ONE TIME DAILY 3/29/19  Yes Provider, Historical   pantoprazole (PROTONIX) 40 mg tablet TAKE ONE TABLET BY MOUTH EVERY MORNING 2/23/19  Yes Provider, Historical   nitroglycerin (NITROSTAT) 0.4 mg SL tablet 0.4 mg by SubLINGual route every five (5) minutes as needed for Chest Pain. Up to 3 doses. Yes Provider, Historical   cholecalciferol (VITAMIN D3) 1,000 unit tablet Take  by mouth daily. Yes Provider, Historical   insulin detemir (LEVEMIR FLEXTOUCH U-100 INSULN SC) by SubCUTAneous route.  Indications: as needed for BS > 150 once a day then take 10 units   Yes Provider, Historical     Allergies   Allergen Reactions    Bactrim [Sulfamethoprim] Other (comments)             Review of Systems:  Constitutional: Appetite is not good, denies weight loss, no fever, no chills, no night sweats. Severe fatigue and weakness as mentioned. Eye: No recent visual disturbances, no discharge, no double vision  Ear/nose/mouth/throat : No hearing disturbance, no ear pain, no nasal congestion, no sore throat, no trouble swallowing. Respiratory : No trouble breathing, no cough, +++ shortness of breath, no hemoptysis, no wheezing  Cardiovascular : No chest pain, no palpitation, no racing of heart, + orthopnea, no paroxysmal nocturnal dyspnea, no peripheral edema  Gastrointestinal : No nausea, no vomiting, no diarrhea, constipation, heartburn, abdominal pain  Genitourinary : No dysuria, no hematuria, no increased frequency,   Lymphatics : No swollen glands -Neck, axillary, inguinal  Endocrine : No excessive thirst, no polyuria no cold intolerance, no heat intolerance. Immunologic : No hives, urticaria, no seasonal allergies,   Musculoskeletal : Complains of generalized weakness all over the body. No joint swelling, pain, effusion,  no back pain, no neck pain,   Integumentary : No rash, no pruritus, no ecchymosis  Hematology : No petechiae, No easy bruising,  No tendency to bleed easy  Neurology : Denies change in mental status, ++ abnormal balance, no headache, no confusion, numbness, tingling,  Psychiatric : No mood swings, no anxiety, depression    Vitals:     Visit Vitals  /69   Pulse 69   Temp 97.8 °F (36.6 °C)   Resp 19   Ht 5' 6\" (1.676 m)   Wt 83.9 kg (185 lb)   SpO2 95%   BMI 29.86 kg/m²       Physical Exam:   General : Looks very tired and weak. Looks comfortable, no respiratory distress noted  HEENT : PERRLA, dry oral mucosa, atraumatic normocephalic, Normal ear and nose.     Neck : Supple, no JVD, no masses noted, no carotid bruit  Lungs : Breath sounds with moderate air entry bilaterally, decreased breath sounds at bases. No wheezes or rales, no accessory muscle use,  CVS : Rhythm rate appears regular. Did not hear any murmur. Abdomen : Distended with protrusion, looks like there is ascites. No guarding or rigidity noted. Extremities : No edema noted,  pedal pulses not palpable  Musculoskeletal : Decreased range of motion, no joint swelling or effusion, generalized wasting of the muscle mass, decreased power and tone. Skin : Very dry, scaly, poor skin turgor. No pathological rash or bruising noted  lymphatic : No cervical lymphadenopathy. Neurological : Awake, alert, oriented to time place person. No neurological deficits. Psychiatric : Mood and affect appears appropriate to the situation.          Data Review:   Recent Results (from the past 24 hour(s))   URINALYSIS W/MICROSCOPIC    Collection Time: 09/22/20  8:15 PM   Result Value Ref Range    Color Heather      Appearance Clear Clear      Specific gravity 1.017 1.003 - 1.030      pH (UA) 6.0 5.0 - 8.0      Protein Negative Negative mg/dL    Glucose Negative Negative mg/dL    Ketone Negative Negative mg/dL    Bilirubin Negative Negative      Blood Negative Negative      Urobilinogen 4.0 (H) 0.1 - 1.0 EU/dL    Nitrites Negative Negative      Leukocyte Esterase Moderate (A) Negative      WBC  0 - 4 /hpf    RBC 0-5 0 - 5 /hpf    Bacteria Negative Negative /hpf    Mucus Trace (A) Negative /lpf    Hyaline cast 10-20 0 - 5 /lpf   BNP    Collection Time: 09/22/20  9:30 PM   Result Value Ref Range    NT pro-BNP 3,556 (H) <450 pg/mL   CBC WITH AUTOMATED DIFF    Collection Time: 09/22/20  9:30 PM   Result Value Ref Range    WBC 5.5 4.1 - 11.1 K/uL    RBC 4.11 4.10 - 5.70 M/uL    HGB 11.3 (L) 12.1 - 17.0 %    HCT 34.4 (L) 36.6 - 50.3 %    MCV 83.7 80.0 - 99.0 FL    MCH 27.5 26.0 - 34.0 PG    MCHC 32.8 30.0 - 36.5 g/dL    RDW 16.2 (H) 11.5 - 14.5 %    PLATELET 799 416 - 459 K/uL    MPV 11.3 8.9 - 12.9 FL    NRBC 0.0 0  WBC    ABSOLUTE NRBC 0.00 0.00 - 0.01 K/uL    NEUTROPHILS 81 (H) 32 - 75 %    LYMPHOCYTES 8 (L) 12 - 49 %    MONOCYTES 8 5 - 13 %    EOSINOPHILS 3 0 - 7 %    BASOPHILS 1 0 - 1 %    IMMATURE GRANULOCYTES 0 0.0 - 0.5 %    ABS. NEUTROPHILS 4.4 1.8 - 8.0 K/UL    ABS. LYMPHOCYTES 0.4 (L) 0.8 - 3.5 K/UL    ABS. MONOCYTES 0.4 0.0 - 1.0 K/UL    ABS. EOSINOPHILS 0.1 0.0 - 0.4 K/UL    ABS. BASOPHILS 0.0 0.0 - 0.1 K/UL    ABS. IMM. GRANS. 0.0 0.00 - 0.04 K/UL    DF AUTOMATED     METABOLIC PANEL, COMPREHENSIVE    Collection Time: 09/22/20  9:30 PM   Result Value Ref Range    Sodium 136 136 - 145 mmol/L    Potassium 4.3 3.5 - 5.1 mmol/L    Chloride 100 97 - 108 mmol/L    CO2 28 21 - 32 mmol/L    Anion gap 8 5 - 15 mmol/L    Glucose 209 (H) 65 - 100 mg/dL    BUN 27 (H) 6 - 20 mg/dL    Creatinine 1.87 (H) 0.70 - 1.30 mg/dL    BUN/Creatinine ratio 14 12 - 20      GFR est AA 42 (L) >60 ml/min/1.73m2    GFR est non-AA 35 (L) >60 ml/min/1.73m2    Calcium 9.1 8.5 - 10.1 mg/dL    Bilirubin, total 0.6 0.2 - 1.0 mg/dL    AST (SGOT) 19 15 - 37 U/L    ALT (SGPT) 15 12 - 78 U/L    Alk. phosphatase 112 45 - 117 U/L    Protein, total 6.3 (L) 6.4 - 8.2 g/dL    Albumin 2.0 (L) 3.5 - 5.0 g/dL    Globulin 4.3 (H) 2.0 - 4.0 g/dL    A-G Ratio 0.5 (L) 1.1 - 2.2     LIPASE    Collection Time: 09/22/20  9:30 PM   Result Value Ref Range    Lipase 129 73 - 393 U/L   TROPONIN I    Collection Time: 09/22/20  9:30 PM   Result Value Ref Range    Troponin-I, Qt. <0.05 <0.05 ng/mL             Assessment and Plan :   Bleb fluid-filled on his left lower extremity: Looks like a pressure wound with localized fluid collection.   I will consult surgery for debridement, and also wound care team.  It will be a complicated issue due to his severe peripheral vascular disease and also patient general malnourished condition    Severe peripheral vascular disease with suspected pressure on the left lower extremity at this time.    Chronic systolic and diastolic heart failure: Seems he is compensated. I do not see any evidence of fluid overload except in his abdomen which is significantly distended. Ascites: Looks like secondary to cardiac etiology, will order ultrasound for assessment of the ascites. Generalized debility with generalized weakness secondary to his chronic conditions and decreased activity. Working with physical therapy which we will continue in the hospital    Diabetes mellitus type 2: Patient developed severe hypoglycemia 3 days ago, so his insulin doses were decreased. Today it seems uncontrolled: We will continue the basal insulin at a smaller dose that he is taking 10 units daily, keep him on Accu-Cheks with sliding scale insulin and adjust as condition dictates. Mom suggested he can have a scheduled dose with each meal.    History of coronary artery disease with native coronary artery and bypass graft disease. History of coronary artery bypass graft, stent placements. Stable with no issues at this time. Malnutrition: He need to be on some protein supplements, also need to work on his physical activity    Benign essential hypertension: Seems to be having low blood pressures, likely secondary to his decreased metabolic activity and also secondary to his cardiac history. He is on small dose of lisinopril and metoprolol which we will continue with holding parameters. Patient has an AICD status, biventricular. Admitted to medical telemetry due to patient's poor underlying condition that he will be at risk to have any procedures done outpatient regarding his left lower extremity wound. And he needed more aggressive care due to his severe peripheral vascular disease, so he need to be hospitalized at least 2 to 3 days for the work-up to be done. Discharge planning need to be discussed with the patient and her family at the time of discharge once he is stable.         Signed By: Anya Leblanc Jodi Shukla MD     September 23, 2020          This dictation was done by dragon, computer voice recognition software. Often unanticipated grammatical, syntax, Lares phones and other interpretive errors are inadvertently transcribed. Please excuse errors that have escaped final proofreading.

## 2020-09-23 NOTE — ED NOTES
This RN called Dr. Becka Carroll @ [phone] to advise he had been on dobutamine drip for 20 minutes now and BP was continuing to trend down. New reading at 1340 was 71/40. Received verbal orders for 250mL NS bolus. Will continue to monitor.

## 2020-09-23 NOTE — PROCEDURES
Date: 9/23/2020  Time: 5:57PM  Indication: Hemodynamic monitoring/Intravenous access  Attending: Fransisco Polanco MD    A time-out was completed verifying correct patient, procedure, site, positioning, and special equipment if applicable. The patient was placed in a dependent position appropriate for central line placement based on the vein to be cannulated. The patients right  neck was prepped and draped in sterile fashion. 1% Lidocaine was used to anesthetize the surrounding skin area. A triple lumen  Cordis catheter was introduced into the the internal jugular vein using the Seldinger technique and under ultrasound guidance. The catheter was threaded smoothly over the guide wire and appropriate blood return was obtained. Each lumen of the catheter was evacuated of air and flushed with sterile saline. The catheter was then secured in place and a sterile dressing applied. Chest Xray was used to confirm placement. Estimated Blood Loss: 5cc  The patient tolerated the procedure well and there were no complications.

## 2020-09-23 NOTE — PROGRESS NOTES
Progress Note    Patient: Christophe Payne MRN: [id number]  SSN: [id number]    YOB: 1940  Age: 79 y.o. Sex: male      Admit Date: 9/22/2020    LOS: 0 days     Subjective:     Denied having any chest pain or shortness of breath. He is lying flat appears to be comfortable. His blood pressure is running low. He was seen and examined in the emergency room room 17. He had debridement of the left lower leg this morning by general surgery. Objective:     Vitals:    09/23/20 0945 09/23/20 1031 09/23/20 1300 09/23/20 1312   BP: (!) 140/108 (!) 145/94 (!) 77/52 (!) 83/57   Pulse: 72 73 72 74   Resp:  18 22 18   Temp:   97.9 °F (36.6 °C)    SpO2:  96% 96% 94%   Weight:       Height:            Intake and Output:  Current Shift: No intake/output data recorded. Last three shifts: 09/21 1901 - 09/23 0700  In: 50 [I.V.:50]  Out: -     Physical Exam:   General:  Alert, cooperative, no distress, appears stated age. Eyes:  Conjunctivae/corneas clear. PERRL, EOMs intact. Fundi benign   Ears:  Normal TMs and external ear canals both ears. Nose: Nares normal. Septum midline. Mucosa normal. No drainage or sinus tenderness. Mouth/Throat: Lips, mucosa, and tongue normal. Teeth and gums normal.   Neck: Supple, symmetrical, trachea midline, no adenopathy, thyroid: no enlargment/tenderness/nodules, no carotid bruit and no JVD. Back:   Symmetric, no curvature. ROM normal. No CVA tenderness. Lungs:   Clear to auscultation bilaterally. Heart:  Regular rate and rhythm, S1, S2 normal, no murmur, click, rub or gallop. Abdomen:   Soft, non-tender. Bowel sounds normal. No masses,  No organomegaly. Extremities: Extremities normal, atraumatic, no cyanosis or edema. Pulses: 2+ and symmetric all extremities. Skin: Skin color, texture, turgor normal. No rashes or lesions   Lymph nodes: Cervical, supraclavicular, and axillary nodes normal.   Neurologic: CNII-XII intact.  Normal strength, sensation and reflexes throughout. Lab/Data Review: All lab results for the last 24 hours reviewed. Assessment:     Active Problems:    Heart failure ([facility] [address] (9/23/2020)        Plan:     Patient's urine was jeni, with  WBC. Moderate leukocyte esterase. Lower extremity swelling has improved. His creatinine 1.87. Potassium 4.3 and sodium 136. BNP was 3500. Troponin were negative. Blood pressure is running low. He received lisinopril, metoprolol succinate. Currently on Plavix and atorvastatin. I would go ahead and start patient on dobutamine. Monitor blood pressure closely. Okay to admit to 4 W. We will not uptitrate dobutamine.    Signed By: Zenia Hartmann MD     September 23, 2020

## 2020-09-24 ENCOUNTER — APPOINTMENT (OUTPATIENT)
Dept: NON INVASIVE DIAGNOSTICS | Age: 80
DRG: 871 | End: 2020-09-24
Attending: NURSE PRACTITIONER
Payer: MEDICARE

## 2020-09-24 LAB
ALBUMIN SERPL-MCNC: 1.7 G/DL (ref 3.5–5)
ALBUMIN/GLOB SERPL: 0.4 {RATIO} (ref 1.1–2.2)
ALP SERPL-CCNC: 110 U/L (ref 45–117)
ALT SERPL-CCNC: 13 U/L (ref 12–78)
ANION GAP SERPL CALC-SCNC: 10 MMOL/L (ref 5–15)
AST SERPL W P-5'-P-CCNC: 21 U/L (ref 15–37)
ATRIAL RATE: 75 BPM
BILIRUB SERPL-MCNC: 0.6 MG/DL (ref 0.2–1)
BUN SERPL-MCNC: 30 MG/DL (ref 6–20)
BUN/CREAT SERPL: 17 (ref 12–20)
CA-I BLD-MCNC: 8.7 MG/DL (ref 8.5–10.1)
CALCULATED P AXIS, ECG09: 66 DEGREES
CALCULATED R AXIS, ECG10: -77 DEGREES
CALCULATED T AXIS, ECG11: 74 DEGREES
CHLORIDE SERPL-SCNC: 97 MMOL/L (ref 97–108)
CO2 SERPL-SCNC: 25 MMOL/L (ref 21–32)
CREAT SERPL-MCNC: 1.79 MG/DL (ref 0.7–1.3)
DIAGNOSIS, 93000: NORMAL
ERYTHROCYTE [DISTWIDTH] IN BLOOD BY AUTOMATED COUNT: 16 % (ref 11.5–14.5)
GLOBULIN SER CALC-MCNC: 4.2 G/DL (ref 2–4)
GLUCOSE BLD STRIP.AUTO-MCNC: 175 MG/DL (ref 65–100)
GLUCOSE BLD STRIP.AUTO-MCNC: 187 MG/DL (ref 65–100)
GLUCOSE BLD STRIP.AUTO-MCNC: 315 MG/DL (ref 65–100)
GLUCOSE SERPL-MCNC: 281 MG/DL (ref 65–100)
HCT VFR BLD AUTO: 30.7 % (ref 36.6–50.3)
HGB BLD-MCNC: 10.2 % (ref 12.1–17)
MCH RBC QN AUTO: 28 PG (ref 26–34)
MCHC RBC AUTO-ENTMCNC: 33.2 G/DL (ref 30–36.5)
MCV RBC AUTO: 84.3 FL (ref 80–99)
P-R INTERVAL, ECG05: 148 MS
PERFORMED BY, TECHID: ABNORMAL
PLATELET # BLD AUTO: 295 K/UL (ref 150–400)
PMV BLD AUTO: 11.5 FL (ref 8.9–12.9)
POTASSIUM SERPL-SCNC: 3.6 MMOL/L (ref 3.5–5.1)
PROT SERPL-MCNC: 5.9 G/DL (ref 6.4–8.2)
Q-T INTERVAL, ECG07: 450 MS
QRS DURATION, ECG06: 160 MS
QTC CALCULATION (BEZET), ECG08: 502 MS
RBC # BLD AUTO: 3.64 M/UL (ref 4.1–5.7)
SARS-COV-2, COV2NT: NOT DETECTED
SODIUM SERPL-SCNC: 132 MMOL/L (ref 136–145)
TSH SERPL DL<=0.05 MIU/L-ACNC: 0.6 UIU/ML (ref 0.36–3.74)
VENTRICULAR RATE, ECG03: 75 BPM
WBC # BLD AUTO: 6.9 K/UL (ref 4.1–11.1)

## 2020-09-24 PROCEDURE — 82962 GLUCOSE BLOOD TEST: CPT

## 2020-09-24 PROCEDURE — 65610000006 HC RM INTENSIVE CARE

## 2020-09-24 PROCEDURE — 80053 COMPREHEN METABOLIC PANEL: CPT

## 2020-09-24 PROCEDURE — 74011250636 HC RX REV CODE- 250/636: Performed by: HOSPITALIST

## 2020-09-24 PROCEDURE — 74011250637 HC RX REV CODE- 250/637: Performed by: HOSPITALIST

## 2020-09-24 PROCEDURE — 83540 ASSAY OF IRON: CPT

## 2020-09-24 PROCEDURE — 84443 ASSAY THYROID STIM HORMONE: CPT

## 2020-09-24 PROCEDURE — 74011636637 HC RX REV CODE- 636/637: Performed by: HOSPITALIST

## 2020-09-24 PROCEDURE — 93306 TTE W/DOPPLER COMPLETE: CPT

## 2020-09-24 PROCEDURE — 74011000250 HC RX REV CODE- 250: Performed by: INTERNAL MEDICINE

## 2020-09-24 PROCEDURE — 82607 VITAMIN B-12: CPT

## 2020-09-24 PROCEDURE — 82306 VITAMIN D 25 HYDROXY: CPT

## 2020-09-24 PROCEDURE — 74011250636 HC RX REV CODE- 250/636: Performed by: PHYSICIAN ASSISTANT

## 2020-09-24 PROCEDURE — 74011250636 HC RX REV CODE- 250/636: Performed by: INTERNAL MEDICINE

## 2020-09-24 PROCEDURE — 77010033678 HC OXYGEN DAILY

## 2020-09-24 PROCEDURE — 36415 COLL VENOUS BLD VENIPUNCTURE: CPT

## 2020-09-24 PROCEDURE — 87040 BLOOD CULTURE FOR BACTERIA: CPT

## 2020-09-24 PROCEDURE — 85027 COMPLETE CBC AUTOMATED: CPT

## 2020-09-24 PROCEDURE — 74011250636 HC RX REV CODE- 250/636

## 2020-09-24 RX ORDER — HEPARIN SODIUM 10000 [USP'U]/ML
5000 INJECTION, SOLUTION INTRAVENOUS; SUBCUTANEOUS EVERY 8 HOURS
Status: DISCONTINUED | OUTPATIENT
Start: 2020-09-24 | End: 2020-10-16 | Stop reason: HOSPADM

## 2020-09-24 RX ORDER — INSULIN GLARGINE 100 [IU]/ML
10 INJECTION, SOLUTION SUBCUTANEOUS
Status: DISCONTINUED | OUTPATIENT
Start: 2020-09-24 | End: 2020-10-03

## 2020-09-24 RX ORDER — DEXTROSE 50 % IN WATER (D50W) INTRAVENOUS SYRINGE
25-50 AS NEEDED
Status: DISCONTINUED | OUTPATIENT
Start: 2020-09-24 | End: 2020-10-16 | Stop reason: HOSPADM

## 2020-09-24 RX ORDER — INSULIN LISPRO 100 [IU]/ML
INJECTION, SOLUTION INTRAVENOUS; SUBCUTANEOUS
Status: DISCONTINUED | OUTPATIENT
Start: 2020-09-24 | End: 2020-09-24

## 2020-09-24 RX ORDER — DOBUTAMINE HYDROCHLORIDE 200 MG/100ML
INJECTION INTRAVENOUS
Status: COMPLETED
Start: 2020-09-24 | End: 2020-09-24

## 2020-09-24 RX ORDER — MAGNESIUM SULFATE 100 %
4 CRYSTALS MISCELLANEOUS AS NEEDED
Status: DISCONTINUED | OUTPATIENT
Start: 2020-09-24 | End: 2020-10-16 | Stop reason: HOSPADM

## 2020-09-24 RX ADMIN — Medication 21 MCG/MIN: at 12:56

## 2020-09-24 RX ADMIN — INSULIN GLARGINE 10 UNITS: 100 INJECTION, SOLUTION SUBCUTANEOUS at 21:23

## 2020-09-24 RX ADMIN — INSULIN LISPRO 7 UNITS: 100 INJECTION, SOLUTION INTRAVENOUS; SUBCUTANEOUS at 13:08

## 2020-09-24 RX ADMIN — INSULIN LISPRO 5 UNITS: 100 INJECTION, SOLUTION INTRAVENOUS; SUBCUTANEOUS at 10:11

## 2020-09-24 RX ADMIN — EZETIMIBE 10 MG: 10 TABLET ORAL at 09:35

## 2020-09-24 RX ADMIN — FERROUS SULFATE TAB 325 MG (65 MG ELEMENTAL FE) 325 MG: 325 (65 FE) TAB at 10:12

## 2020-09-24 RX ADMIN — PANTOPRAZOLE SODIUM 40 MG: 40 TABLET, DELAYED RELEASE ORAL at 10:12

## 2020-09-24 RX ADMIN — HEPARIN SODIUM 5000 UNITS: 10000 INJECTION, SOLUTION INTRAVENOUS; SUBCUTANEOUS at 21:24

## 2020-09-24 RX ADMIN — INSULIN LISPRO 2 UNITS: 100 INJECTION, SOLUTION INTRAVENOUS; SUBCUTANEOUS at 18:03

## 2020-09-24 RX ADMIN — MULTIVITAMIN TABLET 1 TABLET: TABLET at 09:35

## 2020-09-24 RX ADMIN — HEPARIN SODIUM 5000 UNITS: 10000 INJECTION, SOLUTION INTRAVENOUS; SUBCUTANEOUS at 18:04

## 2020-09-24 RX ADMIN — Medication 12 MCG/MIN: at 03:11

## 2020-09-24 RX ADMIN — Medication 1 TABLET: at 09:35

## 2020-09-24 RX ADMIN — LEVOTHYROXINE SODIUM 88 MCG: 88 TABLET ORAL at 06:52

## 2020-09-24 RX ADMIN — CLOPIDOGREL BISULFATE 75 MG: 75 TABLET ORAL at 09:35

## 2020-09-24 RX ADMIN — FUROSEMIDE 40 MG: 40 TABLET ORAL at 09:35

## 2020-09-24 RX ADMIN — DOBUTAMINE IN DEXTROSE 10 MCG/KG/MIN: 200 INJECTION, SOLUTION INTRAVENOUS at 08:08

## 2020-09-24 RX ADMIN — ENOXAPARIN SODIUM 30 MG: 30 INJECTION SUBCUTANEOUS at 03:16

## 2020-09-24 RX ADMIN — ATORVASTATIN CALCIUM 40 MG: 40 TABLET, FILM COATED ORAL at 21:23

## 2020-09-24 RX ADMIN — Medication 21 MCG/MIN: at 20:01

## 2020-09-24 RX ADMIN — DOBUTAMINE IN DEXTROSE 10 MCG/KG/MIN: 200 INJECTION, SOLUTION INTRAVENOUS at 18:04

## 2020-09-24 RX ADMIN — DOBUTAMINE HYDROCHLORIDE 10 MCG/KG/MIN: 200 INJECTION INTRAVENOUS at 08:08

## 2020-09-24 RX ADMIN — MORPHINE SULFATE 2 MG: 2 INJECTION, SOLUTION INTRAMUSCULAR; INTRAVENOUS at 05:40

## 2020-09-24 NOTE — PROGRESS NOTES
Progress Note    Patient: Calvin Kim MRN: [id number]  SSN: [id number]    YOB: 1940  Age: 79 y.o. Sex: male      Admit Date: 9/22/2020    LOS: 1 day     Subjective:     Patient seen in the ICU. No acute events overnight. He was admitted to the ICU due to hypotension. He was started on dobutamine and Levophed. Objective:     Vitals:    09/24/20 0648 09/24/20 0800 09/24/20 0808 09/24/20 0825   BP:   (!) 148/51    Pulse:   82    Resp:       Temp:  97.8 °F (36.6 °C)     SpO2:    93%   Weight: 99.6 kg (219 lb 9.3 oz)      Height:            Intake and Output:  Current Shift: No intake/output data recorded. Last three shifts: 09/22 1901 - 09/24 0700  In: 300 [I.V.:300]  Out: 300 [Urine:300]    Physical Exam:   General:  Alert, cooperative, no distress, appears stated age. Eyes:  Conjunctivae/corneas clear. PERRL, EOMs intact. Fundi benign   Ears:  Normal TMs and external ear canals both ears. Nose: Nares normal. Septum midline. Mucosa normal. No drainage or sinus tenderness. Mouth/Throat: Lips, mucosa, and tongue normal. Teeth and gums normal.   Neck: Supple, symmetrical, trachea midline, no adenopathy, thyroid: no enlargment/tenderness/nodules, no carotid bruit and no JVD. Back:   Symmetric, no curvature. ROM normal. No CVA tenderness. Lungs:   Clear to auscultation bilaterally. Heart:  Regular rate and rhythm, S1, S2 normal, no murmur, click, rub or gallop. Abdomen:   Soft, non-tender. Bowel sounds normal. No masses,  No organomegaly. Extremities: Extremities normal, atraumatic, no cyanosis or edema. Pulses: 2+ and symmetric all extremities. Skin: Skin color, texture, turgor normal. No rashes or lesions   Lymph nodes: Cervical, supraclavicular, and axillary nodes normal.   Neurologic: CNII-XII intact. Normal strength, sensation and reflexes throughout. Lab/Data Review: All lab results for the last 24 hours reviewed.          Assessment:     Active Problems: Heart failure ([facility] (9/23/2020)      Adult failure to thrive (9/23/2020)      Nausea & vomiting (9/23/2020)      Hypotension (9/23/2020)      Acute exacerbation of CHF (congestive heart failure) ([facility] (9/23/2020)        Plan:   Sodium 132, potassium 3.6, creatinine 1.79, BUN 30, glucose 281. TSH 0.6. WBC 6.9, hemoglobin 10.2 and platelet is 284. TSH 0.6. Currently on Levophed and dobutamine. Continue supportive care. His blood pressure appears to be labile. Hold off antihypertensives. Currently on atorvastatin, Plavix, subcu enoxaparin, ezetimibe.   We will continue to follow along with the team.      Signed By: Jen Pal MD     September 24, 2020

## 2020-09-24 NOTE — PROGRESS NOTES
Progress Note    Patient: Seferino Jackson MRN: [id number]  SSN: [id number]    YOB: 1940  Age: 79 y.o. Sex: male      Admit Date: 9/22/2020    LOS: 1 day     Subjective:   79 y.o. male with a history of diabetes, ischemic cardiomyopathy, hypertension presents to the emergency room from [address] [place] [address] with complaining of increasing shortness of breath, blood on his lower extremity. He is feeling very weak and tired with no energy. He has a bullous formation on left lower extremity just above the ankle, it is very big filled with fluid. Denies any injury, he noticed it more than a week ago, it is increasing size. No associated pain in that area. Denies any chest pain. States short of breath easily even with little exertion. He is trying to walk with the physical therapy but states his legs are giving up. Denies any fever or chills. He denies any wounds on the back, admits the weakness is not improving. Fatigue is the main complaint. IandD was performed, however given his significant hypotension he was started in dobutamine and levophed to maintain MAP > 65. He was then transferred to ICU. Still SOB on NC, but minimal improvement. Levophed on 15. CYNDI. Review of Systems   Constitutional: Positive for malaise/fatigue. HENT: Negative. Eyes: Negative. Respiratory: Positive for shortness of breath. Cardiovascular: Positive for palpitations and orthopnea. Gastrointestinal: Positive for heartburn. Genitourinary: Negative. Musculoskeletal: Negative. Skin: Negative. Neurological: Negative. Endo/Heme/Allergies: Negative. Psychiatric/Behavioral: Negative.       Objective:     Vitals:    09/24/20 0800 09/24/20 0808 09/24/20 0825 09/24/20 1100   BP:  (!) 148/51     Pulse:  82     Resp:       Temp: 97.8 °F (36.6 °C)   98 °F (36.7 °C)   SpO2:   93%    Weight:       Height:            Intake and Output:  Current Shift: No intake/output data recorded. Last three shifts: 09/22 1901 - 09/24 0700  In: 300 [I.V.:300]  Out: 300 [Urine:300]    Physical Exam:   Review of Systems:  Constitutional: Appetite is not good, denies weight loss, no fever, no chills, no night sweats. Severe fatigue and weakness as mentioned. Eye: No recent visual disturbances, no discharge, no double vision  Ear/nose/mouth/throat : No hearing disturbance, no ear pain, no nasal congestion, no sore throat, no trouble swallowing. Respiratory : No trouble breathing, no cough, +++ shortness of breath, no hemoptysis, no wheezing  Cardiovascular : No chest pain, no palpitation, no racing of heart, + orthopnea, no paroxysmal nocturnal dyspnea, no peripheral edema  Gastrointestinal : No nausea, no vomiting, no diarrhea, constipation, heartburn, abdominal pain  Genitourinary : No dysuria, no hematuria, no increased frequency,   Lymphatics : No swollen glands -Neck, axillary, inguinal  Endocrine : No excessive thirst, no polyuria no cold intolerance, no heat intolerance. Immunologic : No hives, urticaria, no seasonal allergies,   Musculoskeletal : Complains of generalized weakness all over the body.   No joint swelling, pain, effusion,  no back pain, no neck pain,   Integumentary : No rash, no pruritus, no ecchymosis  Hematology : No petechiae, No easy bruising,  No tendency to bleed easy  Neurology : Denies change in mental status, ++ abnormal balance, no headache, no confusion, numbness, tingling,  Psychiatric : No mood swings, no anxiety, depression    Lab/Data Review:  Recent Results (from the past 24 hour(s))   GLUCOSE, POC    Collection Time: 09/23/20  1:09 PM   Result Value Ref Range    Glucose (POC) 176 (H) 65 - 100 mg/dL    Performed by [address] [place] [address], [facility]    Collection Time: 09/23/20  7:00 PM    Specimen: Nose; Nasal   Result Value Ref Range    Special Requests: No Special Requests      Culture result: MRSA not present     GLUCOSE, POC    Collection Time: 09/23/20 8:25 PM   Result Value Ref Range    Glucose (POC) 255 (H) 65 - 100 mg/dL    Performed by [address] [place] [address]    Collection Time: 09/24/20  4:50 AM   Result Value Ref Range    Sodium 132 (L) 136 - 145 mmol/L    Potassium 3.6 3.5 - 5.1 mmol/L    Chloride 97 97 - 108 mmol/L    CO2 25 21 - 32 mmol/L    Anion gap 10 5 - 15 mmol/L    Glucose 281 (H) 65 - 100 mg/dL    BUN 30 (H) 6 - 20 mg/dL    Creatinine 1.79 (H) 0.70 - 1.30 mg/dL    BUN/Creatinine ratio 17 12 - 20      GFR est AA 44 (L) >60 ml/min/1.73m2    GFR est non-AA 37 (L) >60 ml/min/1.73m2    Calcium 8.7 8.5 - 10.1 mg/dL    Bilirubin, total 0.6 0.2 - 1.0 mg/dL    AST (SGOT) 21 15 - 37 U/L    ALT (SGPT) 13 12 - 78 U/L    Alk. phosphatase 110 45 - 117 U/L    Protein, total 5.9 (L) 6.4 - 8.2 g/dL    Albumin 1.7 (L) 3.5 - 5.0 g/dL    Globulin 4.2 (H) 2.0 - 4.0 g/dL    A-G Ratio 0.4 (L) 1.1 - 2.2     CBC W/O DIFF    Collection Time: 09/24/20  4:50 AM   Result Value Ref Range    WBC 6.9 4.1 - 11.1 K/uL    RBC 3.64 (L) 4.10 - 5.70 M/uL    HGB 10.2 (L) 12.1 - 17.0 %    HCT 30.7 (L) 36.6 - 50.3 %    MCV 84.3 80.0 - 99.0 FL    MCH 28.0 26.0 - 34.0 PG    MCHC 33.2 30.0 - 36.5 g/dL    RDW 16.0 (H) 11.5 - 14.5 %    PLATELET 045 652 - 476 K/uL    MPV 11.5 8.9 - 12.9 FL   TSH 3RD GENERATION    Collection Time: 09/24/20  4:50 AM   Result Value Ref Range    TSH 0.60 0.36 - 3.74 uIU/mL       Assessment:     (1) shock: likely cardiogenic. Levophed and dobutamine. MAP 65. Order bcx to rule out possible infectious cause. Holding BP meds. (2) AECHFr and pEF with AICD: continue #1. Until off of levophed. Likely contributes to #1. (3) PAD: atorvastatin, Plavix, , ezetimibe    (4) CYNDI on CKDIII: cardiorenal potential ATN given the hypotension. Pending today labs. Possibly improve after dobutamine. If worsens will involve nephrolgy. (4) DMII : resume lantus 10 and SSI. (5) CAD s/p stent : plavix.  Complicates all aspects of care    (6) HTN : holding all BP meds.      DVT ppx: heparin      Signed By: Reji Perdomo MD     September 24, 2020

## 2020-09-24 NOTE — PROGRESS NOTES
Bedside shift change report given to Alex Cowart RN  by Harini Guido RN  Report included the following information SBAR.

## 2020-09-24 NOTE — WOUND CARE
[facility] IP WOUND CONSULT Harry Rodas MEDICAL RECORD NUMBER:  [id number] AGE: 79 y.o. GENDER: male  : 1940 TODAY'S DATE:  2020 GENERAL  
 
[] Follow-up [x] New Consult Harry Rodas is a 79 y.o. male referred by:  
[x] Physician 
[] Nursing 
[] Other: PAST MEDICAL HISTORY Past Medical History:  
Diagnosis Date  Diabetes ([facility]  Essential hypertension  Hyperlipidemia  Myocardial infarction ([facility]  Pacemaker PAST SURGICAL HISTORY Past Surgical History:  
Procedure Laterality Date  HX CORONARY ARTERY BYPASS GRAFT  AL INSJ/RPLCMT PERM DFB W/TRNSVNS LDS 1/DUAL CHMBR N/A 2019  
 pm-> ICD upgrade, RV lead, [employer] performed by Jose Green MD at [facility] CATH LAB  
 
 
 
SOCIAL HISTORY Social History Tobacco Use  Smoking status: Former Smoker  Smokeless tobacco: Never Used Substance Use Topics  Alcohol use: Not Currently  Drug use: Never ALLERGIES Allergies Allergen Reactions  Bactrim [Sulfamethoprim] Other (comments) Wt Readings from Last 3 Encounters:  
20 83.9 kg (184 lb 15.5 oz) 19 87.1 kg (192 lb) 19 83.4 kg (183 lb 13.8 oz) [unfilled] Visit Vitals BP (!) 79/37 Pulse 82 Temp 98 °F (36.7 °C) Resp 21 Ht 5' 5.75\" (1.67 m) Wt 83.9 kg (184 lb 15.5 oz) SpO2 95% BMI 30.08 kg/m² ASSESSMENT  
 
A&O x 4 Pain:  
Severity: rates pain 0 on 1-10 pain scale Last John Score :  14 Mobility: needs moderate assistance with turning and repositioning Continence: Continent Wound Identification: Wound Type: open blister Contributing Factors: none Wound Pretibial Distal;Left partial thickness (Active) Non-staged Wound Description Partial thickness 20 Wound Length (cm) 7.2 cm 20 Wound Width (cm) 7 cm 20 Wound Surface Area (cm^2) 50.4 cm^2 20 Condition of Base Pink 09/24/20 1744 Assessment Drainage;Pink;Painful 09/24/20 1744 Tissue Type Percent Pink 100 09/24/20 1744 Drainage Amount Small 09/24/20 1744 Drainage Color Serous 09/24/20 1744 Wound Odor None 09/24/20 1744 Hortencia-wound Assessment Intact;Fragile 09/24/20 1744 Margins Attached edges 09/24/20 1744 Cleansing and Cleansing Agents  Normal saline 09/24/20 1744 Dressing Changed Changed/New 09/24/20 1744 Dressing Type Applied Xeroform;Gauze;Gauze wrap (marlen) 09/24/20 1744 Number of days: 0 PLAN Skin Care & Pressure Relief Recommendations Speciality bed ICU bed Minimize layers of linen Turn/reposition approximately every 2 hours Offload heels pillows Recommendations:  
Cleanse wound with NS, apply Xeroform, cover with gauze and kerlix daily and prn Teaching completed with:  
[x] Patient          
[] Family member      
[] Caregiver         
[] Nursing 
[] Other Topics Discussed: wound care Patient/Caregiver Teaching: 
Level of patient/caregiver understanding able to:  
[x] Indicates understanding       [] Needs reinforcement 
[] Unsuccessful      [] Verbal Understanding 
[] Demonstrated understanding       [] No evidence of learning 
[] Refused teaching         [] N/A Electronically signed by Hayley Morocho RN on 9/24/2020 at 5:47 PM

## 2020-09-25 LAB
ANION GAP SERPL CALC-SCNC: 10 MMOL/L (ref 5–15)
BUN SERPL-MCNC: 26 MG/DL (ref 6–20)
BUN/CREAT SERPL: 18 (ref 12–20)
CA-I BLD-MCNC: 8.5 MG/DL (ref 8.5–10.1)
CHLORIDE SERPL-SCNC: 93 MMOL/L (ref 97–108)
CO2 SERPL-SCNC: 25 MMOL/L (ref 21–32)
COLONY COUNT,CNT: ABNORMAL
CREAT SERPL-MCNC: 1.48 MG/DL (ref 0.7–1.3)
CULTURE,CULT: ABNORMAL
ECHO AO ROOT DIAM: 3.6 CM
ECHO AR MAX VEL PISA: 393 CM/S
ECHO AV PEAK GRADIENT: 9 MMHG
ECHO AV REGURGITANT PHT: 0.51 S
ECHO EST RA PRESSURE: 3 MMHG
ECHO LV E' SEPTAL VELOCITY: 8.19 CM/S
ECHO LV EDV A4C: 48 CM3
ECHO LV EJECTION FRACTION BIPLANE: 46.5 % (ref 55–100)
ECHO LV ESV A4C: 30 CM3
ECHO LV INTERNAL DIMENSION DIASTOLIC: 4.35 CM (ref 4.2–5.9)
ECHO LV INTERNAL DIMENSION SYSTOLIC: 3.53 CM
ECHO LV IVSD: 1.53 CM (ref 0.6–1)
ECHO LV MASS 2D: 246.6 G (ref 88–224)
ECHO LV MASS INDEX 2D: 127.8 G/M2 (ref 49–115)
ECHO LV POSTERIOR WALL DIASTOLIC: 1.35 CM (ref 0.6–1)
ECHO LVOT PEAK GRADIENT: 8 MMHG
ECHO MV A VELOCITY: 65.4 CM/S
ECHO MV E DECELERATION TIME (DT): 0.23 S
ECHO MV E VELOCITY: 100 CM/S
ECHO MV E/A RATIO: 1.53
ECHO MV E/E' SEPTAL: 12.21
ECHO PV PEAK INSTANTANEOUS GRADIENT SYSTOLIC: 5 MMHG
ECHO PV PEAK INSTANTANEOUS GRADIENT SYSTOLIC: 9 MMHG
ECHO PV REGURGITANT MAX VELOCITY: 109 CM/S
ECHO PV REGURGITANT MAX VELOCITY: 141 CM/S
ECHO PV REGURGITANT MAX VELOCITY: 149 CM/S
ECHO PV REGURGITANT MAX VELOCITY: 152 CM/S
ECHO PV REGURGITANT MAX VELOCITY: 368 CM/S
ECHO RIGHT VENTRICULAR SYSTOLIC PRESSURE (RVSP): 37 MMHG
ECHO RV INTERNAL DIMENSION: 3.03 CM
ECHO TV MAX VELOCITY: 292 CM/S
ECHO TV REGURGITANT PEAK GRADIENT: 34 MMHG
FOLATE SERPL-MCNC: 14.5 NG/ML (ref 5–21)
GLUCOSE BLD STRIP.AUTO-MCNC: 213 MG/DL (ref 65–100)
GLUCOSE BLD STRIP.AUTO-MCNC: 266 MG/DL (ref 65–100)
GLUCOSE BLD STRIP.AUTO-MCNC: 269 MG/DL (ref 65–100)
GLUCOSE BLD STRIP.AUTO-MCNC: 276 MG/DL (ref 65–100)
GLUCOSE SERPL-MCNC: 245 MG/DL (ref 65–100)
IRON SATN MFR SERPL: 12 % (ref 20–50)
IRON SERPL-MCNC: 19 UG/DL (ref 35–150)
MAGNESIUM SERPL-MCNC: 1.6 MG/DL (ref 1.6–2.4)
PERFORMED BY, TECHID: ABNORMAL
POTASSIUM SERPL-SCNC: 3.6 MMOL/L (ref 3.5–5.1)
SODIUM SERPL-SCNC: 128 MMOL/L (ref 136–145)
SPECIAL REQUESTS,SREQ: ABNORMAL
TIBC SERPL-MCNC: 162 UG/DL (ref 250–450)
VIT B12 SERPL-MCNC: >2000 PG/ML (ref 193–986)

## 2020-09-25 PROCEDURE — 80048 BASIC METABOLIC PNL TOTAL CA: CPT

## 2020-09-25 PROCEDURE — 74011636637 HC RX REV CODE- 636/637: Performed by: HOSPITALIST

## 2020-09-25 PROCEDURE — 74011250636 HC RX REV CODE- 250/636: Performed by: INTERNAL MEDICINE

## 2020-09-25 PROCEDURE — 83735 ASSAY OF MAGNESIUM: CPT

## 2020-09-25 PROCEDURE — 74011000258 HC RX REV CODE- 258: Performed by: HOSPITALIST

## 2020-09-25 PROCEDURE — 74011250637 HC RX REV CODE- 250/637: Performed by: PHYSICIAN ASSISTANT

## 2020-09-25 PROCEDURE — 36415 COLL VENOUS BLD VENIPUNCTURE: CPT

## 2020-09-25 PROCEDURE — 74011000250 HC RX REV CODE- 250: Performed by: INTERNAL MEDICINE

## 2020-09-25 PROCEDURE — 82306 VITAMIN D 25 HYDROXY: CPT

## 2020-09-25 PROCEDURE — 74011250637 HC RX REV CODE- 250/637: Performed by: HOSPITALIST

## 2020-09-25 PROCEDURE — 65610000006 HC RM INTENSIVE CARE

## 2020-09-25 PROCEDURE — 74011250636 HC RX REV CODE- 250/636: Performed by: HOSPITALIST

## 2020-09-25 PROCEDURE — 82962 GLUCOSE BLOOD TEST: CPT

## 2020-09-25 RX ORDER — POTASSIUM CHLORIDE 1.5 G/1.77G
40 POWDER, FOR SOLUTION ORAL
Status: COMPLETED | OUTPATIENT
Start: 2020-09-25 | End: 2020-09-25

## 2020-09-25 RX ADMIN — ATORVASTATIN CALCIUM 40 MG: 40 TABLET, FILM COATED ORAL at 22:50

## 2020-09-25 RX ADMIN — INSULIN LISPRO 5 UNITS: 100 INJECTION, SOLUTION INTRAVENOUS; SUBCUTANEOUS at 17:03

## 2020-09-25 RX ADMIN — LEVOTHYROXINE SODIUM 88 MCG: 88 TABLET ORAL at 12:43

## 2020-09-25 RX ADMIN — INSULIN GLARGINE 10 UNITS: 100 INJECTION, SOLUTION SUBCUTANEOUS at 22:48

## 2020-09-25 RX ADMIN — Medication 14 MCG/MIN: at 03:52

## 2020-09-25 RX ADMIN — Medication 1 TABLET: at 09:42

## 2020-09-25 RX ADMIN — INSULIN LISPRO 5 UNITS: 100 INJECTION, SOLUTION INTRAVENOUS; SUBCUTANEOUS at 09:42

## 2020-09-25 RX ADMIN — EZETIMIBE 10 MG: 10 TABLET ORAL at 09:42

## 2020-09-25 RX ADMIN — PIPERACILLIN AND TAZOBACTAM 3.38 G: 3; .375 INJECTION, POWDER, LYOPHILIZED, FOR SOLUTION INTRAVENOUS at 12:40

## 2020-09-25 RX ADMIN — HEPARIN SODIUM 5000 UNITS: 10000 INJECTION, SOLUTION INTRAVENOUS; SUBCUTANEOUS at 20:34

## 2020-09-25 RX ADMIN — OXYCODONE HYDROCHLORIDE 5 MG: 5 TABLET ORAL at 12:41

## 2020-09-25 RX ADMIN — FUROSEMIDE 40 MG: 40 TABLET ORAL at 09:42

## 2020-09-25 RX ADMIN — FERROUS SULFATE TAB 325 MG (65 MG ELEMENTAL FE) 325 MG: 325 (65 FE) TAB at 09:42

## 2020-09-25 RX ADMIN — PANTOPRAZOLE SODIUM 40 MG: 40 TABLET, DELAYED RELEASE ORAL at 07:25

## 2020-09-25 RX ADMIN — HEPARIN SODIUM 5000 UNITS: 10000 INJECTION, SOLUTION INTRAVENOUS; SUBCUTANEOUS at 12:41

## 2020-09-25 RX ADMIN — OXYCODONE HYDROCHLORIDE 5 MG: 5 TABLET ORAL at 20:26

## 2020-09-25 RX ADMIN — HEPARIN SODIUM 5000 UNITS: 10000 INJECTION, SOLUTION INTRAVENOUS; SUBCUTANEOUS at 04:11

## 2020-09-25 RX ADMIN — CLOPIDOGREL BISULFATE 75 MG: 75 TABLET ORAL at 09:42

## 2020-09-25 RX ADMIN — Medication 7.5 MCG/MIN: at 15:20

## 2020-09-25 RX ADMIN — INSULIN LISPRO 2 UNITS: 100 INJECTION, SOLUTION INTRAVENOUS; SUBCUTANEOUS at 22:48

## 2020-09-25 RX ADMIN — DOBUTAMINE IN DEXTROSE 5 MCG/KG/MIN: 200 INJECTION, SOLUTION INTRAVENOUS at 15:21

## 2020-09-25 RX ADMIN — POTASSIUM CHLORIDE 40 MEQ: 1.5 POWDER, FOR SOLUTION ORAL at 12:40

## 2020-09-25 RX ADMIN — MULTIVITAMIN TABLET 1 TABLET: TABLET at 09:42

## 2020-09-25 RX ADMIN — OXYCODONE HYDROCHLORIDE 5 MG: 5 TABLET ORAL at 04:11

## 2020-09-25 RX ADMIN — INSULIN LISPRO 5 UNITS: 100 INJECTION, SOLUTION INTRAVENOUS; SUBCUTANEOUS at 12:39

## 2020-09-25 RX ADMIN — DOBUTAMINE IN DEXTROSE 10 MCG/KG/MIN: 200 INJECTION, SOLUTION INTRAVENOUS at 03:51

## 2020-09-25 RX ADMIN — LEVOFLOXACIN 750 MG: 5 INJECTION, SOLUTION INTRAVENOUS at 20:28

## 2020-09-25 NOTE — PROGRESS NOTES
Progress Note    Patient: Piyush Michel MRN: [id number]  SSN: [id number]    YOB: 1940  Age: 79 y.o. Sex: male      Admit Date: 9/22/2020    LOS: 2 days     Subjective:     Patient remains in the ICU. his appetite is poor. He is laying flat denied having any chest pain. Lower extremity swelling continued to improve. He is asking for something to improve his appetite    Objective:     Vitals:    09/25/20 0415 09/25/20 0500 09/25/20 0600 09/25/20 0700   BP: 108/77 (!) 104/48 (!) 105/47 (!) 106/49   Pulse:       Resp: 21 21 21 20   Temp:       SpO2: 96% 97% 97% 97%   Weight:   92.2 kg (203 lb 4.2 oz)    Height:            Intake and Output:  Current Shift: No intake/output data recorded. Last three shifts: 09/23 1901 - 09/25 0700  In: -   Out: 400 [Urine:400]    Physical Exam:   General:  Alert, cooperative, no distress, appears stated age. Eyes:  Conjunctivae/corneas clear. PERRL, EOMs intact. Fundi benign   Ears:  Normal TMs and external ear canals both ears. Nose: Nares normal. Septum midline. Mucosa normal. No drainage or sinus tenderness. Mouth/Throat: Lips, mucosa, and tongue normal. Teeth and gums normal.   Neck: Supple, symmetrical, trachea midline, no adenopathy, thyroid: no enlargment/tenderness/nodules, no carotid bruit and no JVD. Back:   Symmetric, no curvature. ROM normal. No CVA tenderness. Lungs:   Clear to auscultation bilaterally. Heart:  Regular rate and rhythm, S1, S2 normal, no murmur, click, rub or gallop. Abdomen:   Soft, non-tender. Bowel sounds normal. No masses,  No organomegaly. Extremities: Extremities normal, atraumatic, no cyanosis or edema. Pulses: 2+ and symmetric all extremities. Skin: Skin color, texture, turgor normal. No rashes or lesions   Lymph nodes: Cervical, supraclavicular, and axillary nodes normal.   Neurologic: CNII-XII intact. Normal strength, sensation and reflexes throughout. Lab/Data Review:   All lab results for the last 24 hours reviewed. Assessment:     Active Problems:    Heart failure ([facility] (9/23/2020)      Adult failure to thrive (9/23/2020)      Nausea & vomiting (9/23/2020)      Hypotension (9/23/2020)      Acute exacerbation of CHF (congestive heart failure) ([facility] (9/23/2020)    Mr. Cornell Painting is an 54-year-old -American male with:   1. 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 [employer] September 2017   2. Chest pain atypical for angina   3. Coronary artery disease (previous MI and status post coronary CABG)   4. Hypertension with hypertensive heart disease   5. Mixed Dyslipidemia      6.  Peripheral vessel disease   7. Type 2 diabetes mellitus   8. Former tobacco use   9. History of thyroid disease   10. Moderate pulmonary hypertension, RVSP 57 mmHg   11. History of bleeding per rectum and moderately severe diverticulosis of the descending colon and sigmoid colon. Plan:   He tested negative for Covid 19. He was acting like sepsis-type picture. continued to be on norepinephrine and dobutamine. PVCs noted. We'll cut back on dobutamine and tried to leave him on norepinephrine. Try to maintain potassium more than 4 magnesium more than 2. Antibiotic decision per primary team.  Patient with possible UTI. Echo showed low normal ejection fraction. unchanged from previous echo.           Signed By: Nakul Abrams MD     September 25, 2020

## 2020-09-25 NOTE — PROGRESS NOTES
Comprehensive Nutrition Assessment    Type and Reason for Visit: Initial, Consult    Nutrition Recommendations/Plan:   Continue Diabetic diet  Increase Ensure HP from BID to TID    Do not rec'd appetite stimulant initiation unless appetite remains poor >14 days    Consider addition of bowel regimen to promote BMs    Nutrition Assessment:  On 2x pressors. FTT and n/v per MD documentation. S/p I&D to leg blister. Ascites per US abd on admit. Pt reports poor appetite; states no appetite x1 week. RN endorses. Pt reports consuming Ensure HP ordered. Typically good appetite noted. Labs: Na 128, BUN 26, Cr 1.48, . Meds: Norepi, dobutamine, furosemide, statin, heparin, FeSu, D3. Noted pt requesting \"something to help appetite\" per MD documentation; not indicated at this time. Malnutrition Assessment:  Malnutrition Status:  Mild malnutrition    Context:  Acute illness     Findings of the 6 clinical characteristics of malnutrition:   Energy Intake:  1 - 75% or less of est energy req for 7 or more days  Weight Loss:  No significant weight loss     Body Fat Loss:  No significant body fat loss,     Muscle Mass Loss:  No significant muscle mass loss,    Fluid Accumulation:  No significant fluid accumulation,     Strength:  Not performed         Estimated Daily Nutrient Needs:  Energy (kcal):  2075kcal (20kcal/kg)  Protein (g):  90g (1.0g/kg)       Fluid (ml/day):  2075mL (1mL/kcal)    Nutrition Related Findings:  NFPE without acute findings. Significant ascites noted. Last BM 2-3x days ago per pt. Pt denies typical c/d, dysphagia, or n/v.       Wounds:    Full thickness(LLL, s/p I&D)       Current Nutrition Therapies:   DIET DIABETIC CONSISTENT CARB Regular  DIET NUTRITIONAL SUPPLEMENTS Breakfast, Dinner; Ensure High Protein    Anthropometric Measures:  · Height:  5' 6\" (167.6 cm)  · Current Body Wt:  93.8 kg (206 lb 12.7 oz)   · Usual Body Wt:  184 lb 15.5 oz     · Ideal Body Wt:  142 lbs:  145.6 %   · Adjusted Body Weight: (Est Dry BW: 90kg)   · BMI Category:  Obese class 1 (BMI 30.0-34. 9)         Pt denies recent wt changes; stated BW lower than measured BW. Ascites noted. Nutrition Diagnosis:   · Inadequate oral intake related to (poor appetite) as evidenced by (dietary recall per pt, RN)    Nutrition Interventions:   Food and/or Nutrient Delivery: Continue current diet, Modify oral nutrition supplement  Nutrition Education and Counseling: No recommendations at this time  Coordination of Nutrition Care: Continued inpatient monitoring    Goals:  Meet >50% EENs via PO, Wt loss 1kg +/- 0.5kg per week, BG <180mg/dL, Maintain skin integrity       Nutrition Monitoring and Evaluation:   Behavioral-Environmental Outcomes:   N/A  Food/Nutrient Intake Outcomes: Food and nutrient intake, Supplement intake  Physical Signs/Symptoms Outcomes: Skin, Weight, Biochemical data    Discharge Planning:     Too soon to determine     Electronically signed by Constancia Cabot on 9/25/2020 at 10:47 AM    Contact:

## 2020-09-25 NOTE — PROGRESS NOTES
Reason for Admission:   Heart Failure; Acute Exacerbation of CHF; Adult failrure to thrive; N/V and Hypotension                   RUR Score:    16%               Plan for utilizing home health:     Prior HH in the past.        PCP: First and Last name: Alexandrea Smith MD   Name of Practice: Unable to recall    Are you a current patient: Yes/No:  Yes   Approximate date of last visit: July 6th, 2020   Can you participate in a virtual visit with your PCP: No                    Current Advanced Directive/Advance Care Plan: FULL Code. Patient states his daughter Amber Doe is his primary decision maker, in the event he's unable to make decisions for himself. Amber Doe can be reached @ [phone]. Transition of Care Plan:        -d/c home  -PCP follow up  -HH (TBD)? -Medicare 2nd  letter      Patient voiced he lives w/daughter in a ranch style home. Last saw his PCP July 6th, 2020. [facility]). Semi-independent w/ADL's & IADL's, and requires some assistance from his daughter. No home O2. DME (rollator). Prior [facility] in the past.  Prior SNF in the past at [facility]. \"I don't want to go back to a nursing home. \"  No prior IRF in the past.  SW to continue to monitor re: discharge needs. Care Management Interventions  PCP Verified by CM: ([place]atter part of 2019)  Mode of Transport at Discharge: Other (see comment)(Family)  Transition of Care Consult (CM Consult): Discharge Planning  Discharge Durable Medical Equipment: (No home O2.   DME (rollator) )  Current Support Network: Relative's Home(Lives w/daughter in ranch style home)  Discharge Location  Discharge Placement: (Home)        SHERRILL Rebolledo

## 2020-09-25 NOTE — PROGRESS NOTES
{[facility] BEDSIDE_VERBAL_RECORDED_WRITTEN:[id number]::\"Bedside\"   shift change report given to Corin Abel (oncoming nurse) by Belle Nogueira (offgoing nurse). Report included the following information SBAR, Intake/Output and Cardiac Rhythm paced.

## 2020-09-25 NOTE — PROGRESS NOTES
15: 18PM Outbound call to listed emergency contact patient's daughter Nolan Masters @ [phone]. No answer. Will try again later.         Leticia Cooper, MSW

## 2020-09-25 NOTE — PROGRESS NOTES
Progress Note    Patient: Edd Wen MRN: [id number]  SSN: [id number]    YOB: 1940  Age: 79 y.o. Sex: male      Admit Date: 9/22/2020    LOS: 2 days     Subjective:   79 y. o. male with a history of diabetes, ischemic cardiomyopathy, hypertension presents to the emergency room from [facility] care center with complaining of increasing shortness of breath, blood on his lower extremity.  He is feeling very weak and tired with no energy. Brittny Aragon has a bullous formation on left lower extremity just above the ankle, it is very big filled with fluid.  Denies any injury, he noticed it more than a week ago, it is increasing size.  No associated pain in that area.  Denies any chest pain.  States short of breath easily even with little exertion.  He is trying to walk with the physical therapy but states his legs are giving up.  Denies any fever or chills.  He denies any wounds on the back, admits the weakness is not improving.  Fatigue is the main complaint.       IandD was performed, however given his significant hypotension he was started in dobutamine and levophed to maintain MAP > 65.      He was then transferred to ICU.      Still SOB on NC, but minimal improvement. Levophed on 15. CYNDI. Has UTI with enterocccus     Review of Systems   Constitutional: Positive for malaise/fatigue. HENT: Negative. Eyes: Negative. Respiratory: Positive for shortness of breath. Cardiovascular: Positive for palpitations and orthopnea. Gastrointestinal: Positive for heartburn. Genitourinary: Negative. Musculoskeletal: Negative. Skin: Negative. Neurological: Negative. Endo/Heme/Allergies: Negative. Psychiatric/Behavioral: Negative.             Objective:     Vitals:    09/25/20 1200 09/25/20 1500 09/25/20 1600 09/25/20 1700   BP:  (!) 106/49  (!) 121/48   Pulse: 82 74 73 71   Resp:  21  22   Temp:  97.8 °F (36.6 °C)     SpO2:  96%  93%   Weight:       Height:            Intake and Output:  Current Shift: 09/25 0701 - 09/25 1900  In: 200 [P.O.:200]  Out: 400 [Urine:400]  Last three shifts: 09/23 1901 - 09/25 0700  In: -   Out: 400 [Urine:400]      Physical Exam:   General : Appearance:  no respiratory distress noted  HEENT : PERRLA, normal oral mucosa, atraumatic normocephalic, Normal ear and nose. Neck : Supple, no JVD, no masses noted, no carotid bruit  Lungs : normal breath sounds. No wheezing, no rales. He is splinting on deep breathing. CVS : Rhythm rate regular, S1+, S2+, no murmur or gallop  Abdomen : Soft, nontender, no organomegaly, bowel sounds active  Extremities : No edema noted,  pedal pulses palpable  Musculoskeletal : Fair range of motion, no joint swelling or effusion, muscle tone and power appears normal,   Skin : Moist, warm, skin turgor, no pathological rash  Lymphatic : No cervical lymphadenopathy. Neurological : Awake, alert, oriented to time place person. No neurological deficits. Psychiatric : Mood and affect appears appropriate to the situation.        Lab/Data Review:  Recent Results (from the past 24 hour(s))   GLUCOSE, POC    Collection Time: 09/24/20  8:45 PM   Result Value Ref Range    Glucose (POC) 175 (H) 65 - 100 mg/dL    Performed by [facility]    METABOLIC PANEL, BASIC    Collection Time: 09/25/20  4:00 AM   Result Value Ref Range    Sodium 128 (L) 136 - 145 mmol/L    Potassium 3.6 3.5 - 5.1 mmol/L    Chloride 93 (L) 97 - 108 mmol/L    CO2 25 21 - 32 mmol/L    Anion gap 10 5 - 15 mmol/L    Glucose 245 (H) 65 - 100 mg/dL    BUN 26 (H) 6 - 20 mg/dL    Creatinine 1.48 (H) 0.70 - 1.30 mg/dL    BUN/Creatinine ratio 18 12 - 20      GFR est AA 55 (L) >60 ml/min/1.73m2    GFR est non-AA 46 (L) >60 ml/min/1.73m2    Calcium 8.5 8.5 - 10.1 mg/dL   GLUCOSE, POC    Collection Time: 09/25/20  9:27 AM   Result Value Ref Range    Glucose (POC) 269 (H) 65 - 100 mg/dL    Performed by Danae Horn, POC    Collection Time: 09/25/20 12:16 PM Result Value Ref Range    Glucose (POC) 276 (H) 65 - 100 mg/dL    Performed by Marcelina Martin    MAGNESIUM    Collection Time: 09/25/20  3:05 PM   Result Value Ref Range    Magnesium 1.6 1.6 - 2.4 mg/dL   GLUCOSE, POC    Collection Time: 09/25/20  3:35 PM   Result Value Ref Range    Glucose (POC) 266 (H) 65 - 100 mg/dL    Performed by Marcelina Martin          Assessment and plan:    (1) shock: likely cardiogenic and septic. Levophed and dobutamine. MAP 65. Order bcx to rule out possible infectious cause. Holding BP meds.      (2) septic shock: s/t UTI. levaquin 750 daily IV. LA until less than 2    (3) UTI: levaquin    (4) AECHFr and pEF with AICD: continue #1. Until off of levophed. Likely contributes to #1.     (5) PAD: atorvastatin, Plavix, , ezetimibe     (6) CYNDI on CKDIII: cardiorenal potential ATN given the hypotension. Pending today labs. Possibly improve after dobutamine. If worsens will involve nephrolgy.      (7) DMII : resume lantus 10 and SSI.      (8) CAD s/p stent : plavix.  Complicates all aspects of care     (9) HTN : holding all BP meds.      DVT ppx: heparin      Signed By: Estuardo Delgadillo MD     September 25, 2020

## 2020-09-26 LAB
GLUCOSE BLD STRIP.AUTO-MCNC: 142 MG/DL (ref 65–100)
GLUCOSE BLD STRIP.AUTO-MCNC: 256 MG/DL (ref 65–100)
GLUCOSE BLD STRIP.AUTO-MCNC: 278 MG/DL (ref 65–100)
GLUCOSE BLD STRIP.AUTO-MCNC: 98 MG/DL (ref 65–100)
PERFORMED BY, TECHID: ABNORMAL
PERFORMED BY, TECHID: NORMAL

## 2020-09-26 PROCEDURE — 77010033678 HC OXYGEN DAILY

## 2020-09-26 PROCEDURE — 74011250637 HC RX REV CODE- 250/637: Performed by: HOSPITALIST

## 2020-09-26 PROCEDURE — 74011250637 HC RX REV CODE- 250/637: Performed by: PHYSICIAN ASSISTANT

## 2020-09-26 PROCEDURE — 74011636637 HC RX REV CODE- 636/637: Performed by: HOSPITALIST

## 2020-09-26 PROCEDURE — 65610000006 HC RM INTENSIVE CARE

## 2020-09-26 PROCEDURE — 82962 GLUCOSE BLOOD TEST: CPT

## 2020-09-26 PROCEDURE — 74011250636 HC RX REV CODE- 250/636: Performed by: HOSPITALIST

## 2020-09-26 PROCEDURE — 74011000250 HC RX REV CODE- 250: Performed by: INTERNAL MEDICINE

## 2020-09-26 RX ADMIN — Medication 5 MCG/MIN: at 08:43

## 2020-09-26 RX ADMIN — Medication 1 TABLET: at 08:36

## 2020-09-26 RX ADMIN — HEPARIN SODIUM 5000 UNITS: 10000 INJECTION, SOLUTION INTRAVENOUS; SUBCUTANEOUS at 12:21

## 2020-09-26 RX ADMIN — EZETIMIBE 10 MG: 10 TABLET ORAL at 08:36

## 2020-09-26 RX ADMIN — INSULIN LISPRO 6 UNITS: 100 INJECTION, SOLUTION INTRAVENOUS; SUBCUTANEOUS at 16:16

## 2020-09-26 RX ADMIN — HEPARIN SODIUM 5000 UNITS: 10000 INJECTION, SOLUTION INTRAVENOUS; SUBCUTANEOUS at 22:33

## 2020-09-26 RX ADMIN — LEVOTHYROXINE SODIUM 88 MCG: 88 TABLET ORAL at 06:07

## 2020-09-26 RX ADMIN — MULTIVITAMIN TABLET 1 TABLET: TABLET at 08:40

## 2020-09-26 RX ADMIN — OXYCODONE HYDROCHLORIDE 5 MG: 5 TABLET ORAL at 18:22

## 2020-09-26 RX ADMIN — OXYCODONE HYDROCHLORIDE 5 MG: 5 TABLET ORAL at 12:21

## 2020-09-26 RX ADMIN — PANTOPRAZOLE SODIUM 40 MG: 40 TABLET, DELAYED RELEASE ORAL at 08:40

## 2020-09-26 RX ADMIN — FUROSEMIDE 40 MG: 40 TABLET ORAL at 08:36

## 2020-09-26 RX ADMIN — INSULIN GLARGINE 10 UNITS: 100 INJECTION, SOLUTION SUBCUTANEOUS at 22:34

## 2020-09-26 RX ADMIN — RENO CAPS 1 CAPSULE: 100; 1.5; 1.7; 20; 10; 1; 150; 5; 6 CAPSULE ORAL at 08:36

## 2020-09-26 RX ADMIN — HEPARIN SODIUM 5000 UNITS: 10000 INJECTION, SOLUTION INTRAVENOUS; SUBCUTANEOUS at 06:06

## 2020-09-26 RX ADMIN — ATORVASTATIN CALCIUM 40 MG: 40 TABLET, FILM COATED ORAL at 22:33

## 2020-09-26 RX ADMIN — Medication 2.5 MCG/MIN: at 11:00

## 2020-09-26 RX ADMIN — OXYCODONE HYDROCHLORIDE 5 MG: 5 TABLET ORAL at 02:23

## 2020-09-26 RX ADMIN — CLOPIDOGREL BISULFATE 75 MG: 75 TABLET ORAL at 08:36

## 2020-09-26 RX ADMIN — FERROUS SULFATE TAB 325 MG (65 MG ELEMENTAL FE) 325 MG: 325 (65 FE) TAB at 08:36

## 2020-09-26 NOTE — PROGRESS NOTES
4 eyes skin assessment  Pt with Right IJ TLC -dressing dry and intact. Pt with open blister left lower extremity. Wound cleansed with saline-zeroform gauze applied and kerlex applied. Patient's skin otherwise dry and intact.  Skin assessment done with Charanjit Ape RN

## 2020-09-26 NOTE — PROGRESS NOTES
Progress Note    Patient: Eder Lyman MRN: [id number]  SSN: [id number]    YOB: 1940  Age: 79 y.o. Sex: male      Admit Date: 9/22/2020    LOS: 3 days     Subjective:     79 y. o. male with a history of diabetes, ischemic cardiomyopathy, hypertension presents to the emergency room from [facility] care center with complaining of increasing shortness of breath, blood on his lower extremity.  He is feeling very weak and tired with no energy. Eligio De La Cruz has a bullous formation on left lower extremity just above the ankle, it is very big filled with fluid.  Denies any injury, he noticed it more than a week ago, it is increasing size.  No associated pain in that area.  Denies any chest pain.  States short of breath easily even with little exertion.  He is trying to walk with the physical therapy but states his legs are giving up.  Denies any fever or chills.  He denies any wounds on the back, admits the weakness is not improving.  Fatigue is the main complaint.       IandD was performed, however given his significant hypotension he was started in dobutamine and levophed to maintain MAP > 65.      He was then transferred to ICU.      Still SOB on NC, but minimal improvement. Levophed on 15. CYNDI.   Has UTI with enterocccus     Review of Systems   Constitutional: Positive for malaise/fatigue. HENT: Negative.    Eyes: Negative.    Respiratory: Positive for shortness of breath.    Cardiovascular: Positive for palpitations and orthopnea. Gastrointestinal: Positive for heartburn.    Genitourinary: Negative.    Musculoskeletal: Negative.    Skin: Negative.    Neurological: Negative.    Endo/Heme/Allergies: Negative.    Psychiatric/Behavioral: Negative.          Objective:     Vitals:    09/26/20 1200 09/26/20 1300 09/26/20 1400 09/26/20 1500   BP: (!) 117/57 (!) 156/59 (!) 115/54 (!) 109/46   Pulse:       Resp: 18 20 18 18   Temp:    98 °F (36.7 °C)   SpO2: 95% 95% 100% 100%   Weight: Height:            Intake and Output:  Current Shift: 09/26 0701 - 09/26 1900  In: 480 [P.O.:480]  Out: -   Last three shifts: 09/24 1901 - 09/26 0700  In: 430 [P.O.:430]  Out: 1170 [Urine:1170]      Physical Exam:   General : Appearance:  no respiratory distress noted  HEENT : PERRLA, normal oral mucosa, atraumatic normocephalic, Normal ear and nose. Neck : Supple, no JVD, no masses noted, no carotid bruit  Lungs : normal breath sounds.  No wheezing, no rales.  He is splinting on deep breathing. CVS : Rhythm rate regular, S1+, S2+, no murmur or gallop  Abdomen : Soft, nontender, no organomegaly, bowel sounds active  Extremities : No edema noted,  pedal pulses palpable  Musculoskeletal : Fair range of motion, no joint swelling or effusion, muscle tone and power appears normal,   Skin : Moist, warm, skin turgor, no pathological rash  Lymphatic : No cervical lymphadenopathy. Neurological : Awake, alert, oriented to time place person.  No neurological deficits.    Psychiatric : Mood and affect appears appropriate to the situation.     Lab/Data Review:  Recent Results (from the past 24 hour(s))   GLUCOSE, POC    Collection Time: 09/25/20  8:34 PM   Result Value Ref Range    Glucose (POC) 213 (H) 65 - 100 mg/dL    Performed by [address], POC    Collection Time: 09/26/20  7:45 AM   Result Value Ref Range    Glucose (POC) 142 (H) 65 - 100 mg/dL    Performed by Miproto    GLUCOSE, POC    Collection Time: 09/26/20 12:00 PM   Result Value Ref Range    Glucose (POC) 278 (H) 65 - 100 mg/dL    Performed by Miproto    GLUCOSE, POC    Collection Time: 09/26/20  3:46 PM   Result Value Ref Range    Glucose (POC) 256 (H) 65 - 100 mg/dL    Performed by Minesh MUKHERJEE          Assessment and plan:      (1) shock: likely cardiogenic and septic. Levophed and dobutamine. MAP 65. Order bcx to rule out possible infectious cause. Holding BP meds.      (2) septic shock: s/t UTI. levaquin 750 daily IV.  LA until less than 2     (3) UTI: levaquin     (4) AECHFr and pEF with AICD: continue #1. Until off of levophed. Likely contributes to #1.     (5) PAD: atorvastatin, Plavix, , ezetimibe     (6) CYNDI on CKDIII: cardiorenal potential ATN given the hypotension. Pending today labs. Possibly improve after dobutamine. If worsens will involve nephrolgy.      (7) DMII : resume lantus 10 and SSI.      (8) CAD s/p stent : plavix.  Complicates all aspects of care     (9) HTN : holding all BP meds.      DVT ppx: heparin    Signed By: David Seo MD     September 26, 2020

## 2020-09-26 NOTE — PROGRESS NOTES
Progress Note      9/26/2020 8:45 AM  NAME: Zoraida Guerrero   MRN:  [id number]   Admit Diagnosis: Heart failure ([address] [I50.9]  Acute exacerbation of CHF (congestive heart failure) (HCC) [I50.9]  Adult failure to thrive [R62.7]  Nausea & vomiting [R11.2]  Hypotension [I95.9]      Problem List:    1.  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  [employer] September 2017   2.  Chest pain atypical for angina   3.  Coronary artery disease (previous MI and status post coronary CABG)   4.  Hypertension with hypertensive heart disease   5.  Mixed Dyslipidemia      6.  Peripheral vessel disease   7.  Type 2 diabetes mellitus   8.  Former tobacco use   9.  History of thyroid disease   10.  Moderate pulmonary hypertension, RVSP 57 mmHg   11.  History of bleeding per rectum and moderately severe diverticulosis of the descending colon and sigmoid colon. [x]       High complexity decision making was performed in this patient at high risk for decompensation with multiple organ involvement. Subjective: The patient is seen and examined in room 279. There were no acute cardiovascular events reported overnight. Currently, he denies any cardiovascular complaints. Specifically, he denies chest pain or shortness of breath. The patient remains on IV norepinephrine.     Medications Personally Reviewed:    Current Facility-Administered Medications   Medication Dose Route Frequency    levoFLOXacin (LEVAQUIN) 750 mg in 150 mL IV syringe  750 mg IntraVENous Q48H    heparin porcine injection 5,000 Units  5,000 Units SubCUTAneous Q8H    insulin glargine (LANTUS) injection 10 Units  10 Units SubCUTAneous QHS    glucose chewable tablet 16 g  4 Tab Oral PRN    glucagon (GLUCAGEN) injection 1 mg  1 mg IntraMUSCular PRN    dextrose (D50W) injection syrg 12.5-25 g  25-50 mL IntraVENous PRN    atorvastatin (LIPITOR) tablet 40 mg  40 mg Oral QHS    cholecalciferol (VITAMIN D3) (1000 Units /25 mcg) tablet 1 Tab  1,000 Units Oral DAILY    clopidogreL (PLAVIX) tablet 75 mg  75 mg Oral DAILY    ezetimibe (ZETIA) tablet 10 mg  10 mg Oral DAILY    ferrous sulfate tablet 325 mg  1 Tab Oral DAILY WITH BREAKFAST    furosemide (LASIX) tablet 40 mg  40 mg Oral DAILY    [Held by provider] SITagliptin (JANUVIA) tablet tab 50 mg  50 mg Oral DAILY    levothyroxine (SYNTHROID) tablet 88 mcg  88 mcg Oral 6am    [Held by provider] lisinopriL (PRINIVIL, ZESTRIL) tablet 5 mg  5 mg Oral DAILY    [Held by provider] metoprolol succinate (TOPROL-XL) XL tablet 25 mg  25 mg Oral DAILY    pantoprazole (PROTONIX) tablet 40 mg  40 mg Oral ACB    [Held by provider] ranolazine ER (RANEXA) tablet 500 mg  500 mg Oral BID    insulin lispro (HUMALOG) injection   SubCUTAneous AC&HS    glucose chewable tablet 16 g  4 Tab Oral PRN    dextrose (D50W) injection syrg 12.5-25 g  25-50 mL IntraVENous PRN    glucagon (GLUCAGEN) injection 1 mg  1 mg IntraMUSCular PRN    multivitamin (ONE A DAY) tablet 1 Tab  1 Tab Oral DAILY    B complex-vitaminC-folic acid (NEPHROCAP) cap  1 Cap Oral DAILY    DOBUTamine (DOBUTREX) 500 mg/250 mL (2,000 mcg/mL) infusion  10 mcg/kg/min IntraVENous CONTINUOUS    NOREPINephrine (LEVOPHED) 8 mg in 5% dextrose 250mL (32 mcg/mL) infusion  5 mcg/min IntraVENous TITRATE    acetaminophen (TYLENOL) tablet 650 mg  650 mg Oral Q4H PRN    oxyCODONE IR (ROXICODONE) tablet 5 mg  5 mg Oral Q6H PRN           Objective:      Physical Exam:    Intake/Output Summary (Last 24 hours) at 9/26/2020 0845  Last data filed at 9/26/2020 0423  Gross per 24 hour   Intake 430 ml   Output 1070 ml   Net -640 ml      Last 24hrs VS reviewed since prior progress note.  Most recent are:    Visit Vitals  BP (!) 125/53   Pulse 76   Temp 98.8 °F (37.1 °C)   Resp 22   Ht 5' 6\" (1.676 m)   Wt 93.8 kg (206 lb 12.7 oz)   SpO2 95%   BMI 33.38 kg/m²       Intake/Output Summary (Last 24 hours) at 9/26/2020 0845  Last data filed at 9/26/2020 0423  Gross per 24 hour   Intake 430 ml   Output 1070 ml   Net -640 ml        General Appearance: Well developed, well nourished, alert & oriented x 3, no acute distress. Chest: Lungs clear to auscultation bilaterally. Cardiovascular: JVP is not elevated, PMI is not palpable, normal intensity S1-S2, without S3  Abdomen: Soft, non-tender, bowel sounds are active. Extremities: No edema bilaterally. PMH/SH reviewed - no change compared to H&P    Data Review    Telemetry: normal sinus rhythm     EKG:   [x]  No new EKG for review    Lab Data Personally Reviewed:    Recent Labs     09/24/20  0450   WBC 6.9   HGB 10.2*   HCT 30.7*        No results for input(s): INR, PTP, APTT, INREXT in the last 72 hours. Recent Labs     09/25/20  1505 09/25/20  0400 09/24/20  0450   NA  --  128* 132*   K  --  3.6 3.6   CL  --  93* 97   CO2  --  25 25   BUN  --  26* 30*   CREA  --  1.48* 1.79*   GLU  --  245* 281*   CA  --  8.5 8.7   MG 1.6  --   --      No results for input(s): CPK, CKNDX, TROIQ in the last 72 hours. No lab exists for component: CPKMB  No results found for: CHOL, CHOLX, CHLST, CHOLV, HDL, HDLP, LDL, LDLC, DLDLP, TGLX, TRIGL, TRIGP, CHHD, CHHDX    Recent Labs     09/24/20  0450      TP 5.9*   ALB 1.7*   GLOB 4.2*     No results for input(s): PH, PCO2, PO2 in the last 72 hours. Assessment/Plan:   1. Continue telemetry monitoring  2. Continue to monitor serum electrolytes, renal function  3. Continue to monitor daily weights, and fluid balance  4.   Wean IV norepinephrine as tolerated     Sony Yi MD

## 2020-09-27 LAB
GLUCOSE BLD STRIP.AUTO-MCNC: 118 MG/DL (ref 65–100)
GLUCOSE BLD STRIP.AUTO-MCNC: 124 MG/DL (ref 65–100)
GLUCOSE BLD STRIP.AUTO-MCNC: 168 MG/DL (ref 65–100)
GLUCOSE BLD STRIP.AUTO-MCNC: 257 MG/DL (ref 65–100)
PERFORMED BY, TECHID: ABNORMAL

## 2020-09-27 PROCEDURE — 74011250636 HC RX REV CODE- 250/636: Performed by: HOSPITALIST

## 2020-09-27 PROCEDURE — 74011250637 HC RX REV CODE- 250/637: Performed by: HOSPITALIST

## 2020-09-27 PROCEDURE — 82962 GLUCOSE BLOOD TEST: CPT

## 2020-09-27 PROCEDURE — 74011636637 HC RX REV CODE- 636/637: Performed by: HOSPITALIST

## 2020-09-27 PROCEDURE — 65270000029 HC RM PRIVATE

## 2020-09-27 PROCEDURE — 74011250637 HC RX REV CODE- 250/637: Performed by: PHYSICIAN ASSISTANT

## 2020-09-27 RX ORDER — LEVOFLOXACIN 5 MG/ML
750 INJECTION, SOLUTION INTRAVENOUS
Status: DISCONTINUED | OUTPATIENT
Start: 2020-09-27 | End: 2020-10-11

## 2020-09-27 RX ADMIN — OXYCODONE HYDROCHLORIDE 5 MG: 5 TABLET ORAL at 10:08

## 2020-09-27 RX ADMIN — ATORVASTATIN CALCIUM 40 MG: 40 TABLET, FILM COATED ORAL at 21:44

## 2020-09-27 RX ADMIN — CLOPIDOGREL BISULFATE 75 MG: 75 TABLET ORAL at 09:51

## 2020-09-27 RX ADMIN — INSULIN LISPRO 5 UNITS: 100 INJECTION, SOLUTION INTRAVENOUS; SUBCUTANEOUS at 16:59

## 2020-09-27 RX ADMIN — LEVOTHYROXINE SODIUM 88 MCG: 88 TABLET ORAL at 06:11

## 2020-09-27 RX ADMIN — HEPARIN SODIUM 5000 UNITS: 10000 INJECTION, SOLUTION INTRAVENOUS; SUBCUTANEOUS at 06:11

## 2020-09-27 RX ADMIN — RENO CAPS 1 CAPSULE: 100; 1.5; 1.7; 20; 10; 1; 150; 5; 6 CAPSULE ORAL at 16:52

## 2020-09-27 RX ADMIN — HEPARIN SODIUM 5000 UNITS: 10000 INJECTION, SOLUTION INTRAVENOUS; SUBCUTANEOUS at 21:43

## 2020-09-27 RX ADMIN — MULTIVITAMIN TABLET 1 TABLET: TABLET at 09:51

## 2020-09-27 RX ADMIN — FERROUS SULFATE TAB 325 MG (65 MG ELEMENTAL FE) 325 MG: 325 (65 FE) TAB at 09:51

## 2020-09-27 RX ADMIN — EZETIMIBE 10 MG: 10 TABLET ORAL at 09:50

## 2020-09-27 RX ADMIN — INSULIN LISPRO 2 UNITS: 100 INJECTION, SOLUTION INTRAVENOUS; SUBCUTANEOUS at 12:36

## 2020-09-27 RX ADMIN — FUROSEMIDE 40 MG: 40 TABLET ORAL at 09:50

## 2020-09-27 RX ADMIN — LEVOFLOXACIN 750 MG: 5 INJECTION, SOLUTION INTRAVENOUS at 14:47

## 2020-09-27 RX ADMIN — Medication 1 TABLET: at 09:51

## 2020-09-27 RX ADMIN — PANTOPRAZOLE SODIUM 40 MG: 40 TABLET, DELAYED RELEASE ORAL at 09:51

## 2020-09-27 RX ADMIN — INSULIN GLARGINE 10 UNITS: 100 INJECTION, SOLUTION SUBCUTANEOUS at 21:44

## 2020-09-27 RX ADMIN — HEPARIN SODIUM 5000 UNITS: 10000 INJECTION, SOLUTION INTRAVENOUS; SUBCUTANEOUS at 14:47

## 2020-09-27 NOTE — PROGRESS NOTES
Progress Note      9/27/2020 6:23 AM  NAME: Charlie Sifuentes   MRN:  [id number]   Admit Diagnosis: Heart failure ([facility] [address] [I50.9]  Acute exacerbation of CHF (congestive heart failure) (HCC) [I50.9]  Adult failure to thrive [R62.7]  Nausea & vomiting [R11.2]  Hypotension [I95.9]      Problem List:    1.  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  [email or web address] September 2017   2.  Chest pain atypical for angina   3.  Coronary artery disease (previous MI and status post coronary CABG)   4.  Hypertension with hypertensive heart disease   5.  Mixed Dyslipidemia      6.  Peripheral vessel disease   7.  Type 2 diabetes mellitus   8.  Former tobacco use   9.  History of thyroid disease   10.  Moderate pulmonary hypertension, RVSP 57 mmHg   11.  History of bleeding per rectum and moderately severe diverticulosis of the descending colon and sigmoid colon. Subjective: The patient is seen and examined in room 268. There were no acute cardiovascular events reported overnight. Currently, he denies any cardiovascular complaints. Specifically, he denies chest pain or shortness of breath. He is off all inotropic support.     Medications Personally Reviewed:    Current Facility-Administered Medications   Medication Dose Route Frequency    levoFLOXacin (LEVAQUIN) 750 mg in 150 mL IV syringe  750 mg IntraVENous Q48H    heparin porcine injection 5,000 Units  5,000 Units SubCUTAneous Q8H    insulin glargine (LANTUS) injection 10 Units  10 Units SubCUTAneous QHS    glucose chewable tablet 16 g  4 Tab Oral PRN    glucagon (GLUCAGEN) injection 1 mg  1 mg IntraMUSCular PRN    dextrose (D50W) injection syrg 12.5-25 g  25-50 mL IntraVENous PRN    atorvastatin (LIPITOR) tablet 40 mg  40 mg Oral QHS    cholecalciferol (VITAMIN D3) (1000 Units /25 mcg) tablet 1 Tab  1,000 Units Oral DAILY    clopidogreL (PLAVIX) tablet 75 mg  75 mg Oral DAILY    ezetimibe (ZETIA) tablet 10 mg  10 mg Oral DAILY  ferrous sulfate tablet 325 mg  1 Tab Oral DAILY WITH BREAKFAST    furosemide (LASIX) tablet 40 mg  40 mg Oral DAILY    [Held by provider] SITagliptin (JANUVIA) tablet tab 50 mg  50 mg Oral DAILY    levothyroxine (SYNTHROID) tablet 88 mcg  88 mcg Oral 6am    [Held by provider] lisinopriL (PRINIVIL, ZESTRIL) tablet 5 mg  5 mg Oral DAILY    [Held by provider] metoprolol succinate (TOPROL-XL) XL tablet 25 mg  25 mg Oral DAILY    pantoprazole (PROTONIX) tablet 40 mg  40 mg Oral ACB    [Held by provider] ranolazine ER (RANEXA) tablet 500 mg  500 mg Oral BID    insulin lispro (HUMALOG) injection   SubCUTAneous AC&HS    glucose chewable tablet 16 g  4 Tab Oral PRN    dextrose (D50W) injection syrg 12.5-25 g  25-50 mL IntraVENous PRN    glucagon (GLUCAGEN) injection 1 mg  1 mg IntraMUSCular PRN    multivitamin (ONE A DAY) tablet 1 Tab  1 Tab Oral DAILY    B complex-vitaminC-folic acid (NEPHROCAP) cap  1 Cap Oral DAILY    DOBUTamine (DOBUTREX) 500 mg/250 mL (2,000 mcg/mL) infusion  10 mcg/kg/min IntraVENous CONTINUOUS    NOREPINephrine (LEVOPHED) 8 mg in 5% dextrose 250mL (32 mcg/mL) infusion  5 mcg/min IntraVENous TITRATE    acetaminophen (TYLENOL) tablet 650 mg  650 mg Oral Q4H PRN    oxyCODONE IR (ROXICODONE) tablet 5 mg  5 mg Oral Q6H PRN           Objective:      Physical Exam:  Last 24hrs VS reviewed since prior progress note. Most recent are:    Visit Vitals  /60 (BP 1 Location: Right arm, BP Patient Position: At rest;Head of bed elevated (Comment degrees))   Pulse (P) 76   Temp 98 °F (36.7 °C)   Resp (P) 24   Ht 5' 6\" (1.676 m)   Wt 93.8 kg (206 lb 12.7 oz)   SpO2 97%   BMI 33.38 kg/m²       Intake/Output Summary (Last 24 hours) at 9/27/2020 3027  Last data filed at 9/26/2020 1800  Gross per 24 hour   Intake 960 ml   Output    Net 960 ml        General Appearance: Well developed, well nourished, alert & oriented x 3, no acute distress.   Chest: Lungs clear to auscultation bilaterally. Cardiovascular: Regular rate and rhythm, S1S2 normal, no murmur. Abdomen: Soft, non-tender, bowel sounds are active. Extremities: No edema bilaterally. Data Review    Telemetry: normal sinus rhythm     EKG:   []  No new EKG for review    Lab Data Personally Reviewed:    No results for input(s): WBC, HGB, HCT, PLT, HGBEXT, HCTEXT, PLTEXT in the last 72 hours. No results for input(s): INR, PTP, APTT, INREXT in the last 72 hours. Recent Labs     09/25/20  1505 09/25/20  0400   NA  --  128*   K  --  3.6   CL  --  93*   CO2  --  25   BUN  --  26*   CREA  --  1.48*   GLU  --  245*   CA  --  8.5   MG 1.6  --      No results for input(s): CPK, CKNDX, TROIQ in the last 72 hours. No lab exists for component: CPKMB  No results found for: CHOL, CHOLX, CHLST, CHOLV, HDL, HDLP, LDL, LDLC, DLDLP, TGLX, TRIGL, TRIGP, CHHD, CHHDX    No results for input(s): AP, TBIL, TP, ALB, GLOB, GGT, AML, LPSE in the last 72 hours. No lab exists for component: SGOT, GPT, AMYP, HLPSE  No results for input(s): PH, PCO2, PO2 in the last 72 hours. Assessment/Plan:   1. Continue telemetry monitoring  2. Continue to monitor serum electrolytes, renal function  3. Continue to monitor daily weights, and fluid balance  4. Continue cardiovascular medications as listed  5.   The patient may transfer to telemetry     Zachery Palmer MD

## 2020-09-28 LAB
25(OH)D3 SERPL-MCNC: 29.3 NG/ML (ref 30–100)
ANION GAP SERPL CALC-SCNC: 8 MMOL/L (ref 5–15)
BASOPHILS # BLD: 0.1 K/UL (ref 0–0.1)
BASOPHILS NFR BLD: 1 % (ref 0–1)
BUN SERPL-MCNC: 21 MG/DL (ref 6–20)
BUN/CREAT SERPL: 15 (ref 12–20)
CA-I BLD-MCNC: 8 MG/DL (ref 8.5–10.1)
CHLORIDE SERPL-SCNC: 96 MMOL/L (ref 97–108)
CO2 SERPL-SCNC: 28 MMOL/L (ref 21–32)
CREAT SERPL-MCNC: 1.36 MG/DL (ref 0.7–1.3)
DIFFERENTIAL METHOD BLD: ABNORMAL
EOSINOPHIL # BLD: 0 K/UL (ref 0–0.4)
EOSINOPHIL NFR BLD: 0 % (ref 0–7)
ERYTHROCYTE [DISTWIDTH] IN BLOOD BY AUTOMATED COUNT: 16 % (ref 11.5–14.5)
GLUCOSE BLD STRIP.AUTO-MCNC: 155 MG/DL (ref 65–100)
GLUCOSE BLD STRIP.AUTO-MCNC: 159 MG/DL (ref 65–100)
GLUCOSE BLD STRIP.AUTO-MCNC: 185 MG/DL (ref 65–100)
GLUCOSE BLD STRIP.AUTO-MCNC: 269 MG/DL (ref 65–100)
GLUCOSE SERPL-MCNC: 171 MG/DL (ref 65–100)
HCT VFR BLD AUTO: 28.4 % (ref 36.6–50.3)
HGB BLD-MCNC: 9.3 % (ref 12.1–17)
IMM GRANULOCYTES # BLD AUTO: 0 K/UL
IMM GRANULOCYTES NFR BLD AUTO: 0 %
LACTATE SERPL-SCNC: 0.9 MMOL/L (ref 0.4–2)
LYMPHOCYTES # BLD: 0.1 K/UL (ref 0.8–3.5)
LYMPHOCYTES NFR BLD: 2 % (ref 12–49)
MCH RBC QN AUTO: 27.1 PG (ref 26–34)
MCHC RBC AUTO-ENTMCNC: 32.7 G/DL (ref 30–36.5)
MCV RBC AUTO: 82.8 FL (ref 80–99)
METAMYELOCYTES NFR BLD MANUAL: 4 %
MONOCYTES # BLD: 0.4 K/UL (ref 0–1)
MONOCYTES NFR BLD: 6 % (ref 5–13)
MYELOCYTES NFR BLD MANUAL: 5 %
NEUTS SEG # BLD: 5.8 K/UL (ref 1.8–8)
NEUTS SEG NFR BLD: 82 % (ref 32–75)
PERFORMED BY, TECHID: ABNORMAL
PLATELET # BLD AUTO: 376 K/UL (ref 150–400)
PLATELET COMMENTS,PCOM: ABNORMAL
PMV BLD AUTO: 11.1 FL (ref 8.9–12.9)
POTASSIUM SERPL-SCNC: 3.7 MMOL/L (ref 3.5–5.1)
RBC # BLD AUTO: 3.43 M/UL (ref 4.1–5.7)
RBC MORPH BLD: ABNORMAL
RBC MORPH BLD: ABNORMAL
SODIUM SERPL-SCNC: 132 MMOL/L (ref 136–145)
WBC # BLD AUTO: 7.1 K/UL (ref 4.1–11.1)

## 2020-09-28 PROCEDURE — 74011250637 HC RX REV CODE- 250/637: Performed by: PHYSICIAN ASSISTANT

## 2020-09-28 PROCEDURE — 82962 GLUCOSE BLOOD TEST: CPT

## 2020-09-28 PROCEDURE — 74011250636 HC RX REV CODE- 250/636: Performed by: HOSPITALIST

## 2020-09-28 PROCEDURE — 94760 N-INVAS EAR/PLS OXIMETRY 1: CPT

## 2020-09-28 PROCEDURE — 36415 COLL VENOUS BLD VENIPUNCTURE: CPT

## 2020-09-28 PROCEDURE — 74011636637 HC RX REV CODE- 636/637: Performed by: HOSPITALIST

## 2020-09-28 PROCEDURE — 80048 BASIC METABOLIC PNL TOTAL CA: CPT

## 2020-09-28 PROCEDURE — 77010033678 HC OXYGEN DAILY

## 2020-09-28 PROCEDURE — 83605 ASSAY OF LACTIC ACID: CPT

## 2020-09-28 PROCEDURE — 85025 COMPLETE CBC W/AUTO DIFF WBC: CPT

## 2020-09-28 PROCEDURE — 65270000029 HC RM PRIVATE

## 2020-09-28 PROCEDURE — 74011250637 HC RX REV CODE- 250/637: Performed by: HOSPITALIST

## 2020-09-28 RX ADMIN — MULTIVITAMIN TABLET 1 TABLET: TABLET at 08:25

## 2020-09-28 RX ADMIN — LEVOTHYROXINE SODIUM 88 MCG: 88 TABLET ORAL at 05:45

## 2020-09-28 RX ADMIN — INSULIN LISPRO 5 UNITS: 100 INJECTION, SOLUTION INTRAVENOUS; SUBCUTANEOUS at 17:15

## 2020-09-28 RX ADMIN — Medication 1 TABLET: at 08:25

## 2020-09-28 RX ADMIN — OXYCODONE HYDROCHLORIDE 5 MG: 5 TABLET ORAL at 05:44

## 2020-09-28 RX ADMIN — OXYCODONE HYDROCHLORIDE 5 MG: 5 TABLET ORAL at 13:39

## 2020-09-28 RX ADMIN — EZETIMIBE 10 MG: 10 TABLET ORAL at 08:25

## 2020-09-28 RX ADMIN — ATORVASTATIN CALCIUM 40 MG: 40 TABLET, FILM COATED ORAL at 21:32

## 2020-09-28 RX ADMIN — INSULIN LISPRO 2 UNITS: 100 INJECTION, SOLUTION INTRAVENOUS; SUBCUTANEOUS at 21:33

## 2020-09-28 RX ADMIN — INSULIN GLARGINE 10 UNITS: 100 INJECTION, SOLUTION SUBCUTANEOUS at 21:32

## 2020-09-28 RX ADMIN — HEPARIN SODIUM 5000 UNITS: 10000 INJECTION, SOLUTION INTRAVENOUS; SUBCUTANEOUS at 12:14

## 2020-09-28 RX ADMIN — HEPARIN SODIUM 5000 UNITS: 10000 INJECTION, SOLUTION INTRAVENOUS; SUBCUTANEOUS at 21:32

## 2020-09-28 RX ADMIN — INSULIN LISPRO 2 UNITS: 100 INJECTION, SOLUTION INTRAVENOUS; SUBCUTANEOUS at 08:24

## 2020-09-28 RX ADMIN — FERROUS SULFATE TAB 325 MG (65 MG ELEMENTAL FE) 325 MG: 325 (65 FE) TAB at 08:25

## 2020-09-28 RX ADMIN — HEPARIN SODIUM 5000 UNITS: 10000 INJECTION, SOLUTION INTRAVENOUS; SUBCUTANEOUS at 05:45

## 2020-09-28 RX ADMIN — CLOPIDOGREL BISULFATE 75 MG: 75 TABLET ORAL at 08:25

## 2020-09-28 RX ADMIN — INSULIN LISPRO 2 UNITS: 100 INJECTION, SOLUTION INTRAVENOUS; SUBCUTANEOUS at 12:14

## 2020-09-28 RX ADMIN — FUROSEMIDE 40 MG: 40 TABLET ORAL at 08:25

## 2020-09-28 RX ADMIN — RENO CAPS 1 CAPSULE: 100; 1.5; 1.7; 20; 10; 1; 150; 5; 6 CAPSULE ORAL at 08:25

## 2020-09-28 RX ADMIN — PANTOPRAZOLE SODIUM 40 MG: 40 TABLET, DELAYED RELEASE ORAL at 08:25

## 2020-09-28 NOTE — PROGRESS NOTES
Bedside and Verbal shift change report given to Constantine Londono, RN and Elvira CHIU (oncoming nurse) by Madhav Slade (offgoing nurse). Report included the following information SBAR, Procedure Summary, Intake/Output and MAR. Bedside and Verbal shift change report given to Ilya Parker Rn (oncoming nurse) by Gabby Vázquez (offgoing nurse). Report included the following information SBAR, Intake/Output, MAR and Recent Results.

## 2020-09-28 NOTE — PROGRESS NOTES
Progress Note    Patient: Sixto Fonseca MRN: [id number]  SSN: [id number]    YOB: 1940  Age: 79 y.o. Sex: male      Admit Date: 9/22/2020    LOS: 5 days     Subjective:     80F, H/o AECHFr and pEF with AICD, PAD, CAD s/p stent with septic shock s.t UTI    Resolved. On levaqui, PO lasix. Awaiting , DC tomorrow as per PT recommendations.            Review of Systems   Constitutional: Positive for malaise/fatigue. HENT: Negative.    Eyes: Negative.    Respiratory: Positive for shortness of breath.    Cardiovascular: Positive for palpitations and orthopnea. Gastrointestinal: Positive for heartburn. Genitourinary: Negative.    Musculoskeletal: Negative.    Skin: Negative.    Neurological: Negative.    Endo/Heme/Allergies: Negative.    Psychiatric/Behavioral: Negative.          Objective:     Vitals:    09/28/20 0714 09/28/20 0845 09/28/20 1028 09/28/20 1114   BP: (!) 102/54  (!) 102/54 (!) 106/53   Pulse: 72  72 66   Resp: 20  20 20   Temp: 98.5 °F (36.9 °C)  98.5 °F (36.9 °C) 98.7 °F (37.1 °C)   SpO2: 92% 92%  93%   Weight:       Height:            Intake and Output:  Current Shift: 09/28 0701 - 09/28 1900  In: 300 [P.O.:300]  Out: -   Last three shifts: 09/26 1901 - 09/28 0700  In: -   Out: 1400 [Urine:1400]      Physical Exam:   General : Appearance:  no respiratory distress noted  HEENT : PERRLA, normal oral mucosa, atraumatic normocephalic, Normal ear and nose. Neck : Supple, no JVD, no masses noted, no carotid bruit  Lungs : normal breath sounds.  No wheezing, no rales.  He is splinting on deep breathing.   CVS : Rhythm rate regular, S1+, S2+, no murmur or gallop  Abdomen : Soft, nontender, no organomegaly, bowel sounds active  Extremities : No edema noted,  pedal pulses palpable  Musculoskeletal : Fair range of motion, no joint swelling or effusion, muscle tone and power appears normal,   Skin : Moist, warm, skin turgor, no pathological rash  Lymphatic : No cervical lymphadenopathy. Neurological : Awake, alert, oriented to time place person.  No neurological deficits.    Psychiatric : Mood and affect appears appropriate to the situation.     Lab/Data Review:  Recent Results (from the past 24 hour(s))   GLUCOSE, POC    Collection Time: 09/27/20  3:41 PM   Result Value Ref Range    Glucose (POC) 257 (H) 65 - 100 mg/dL    Performed by CONRADO HIDALGO    GLUCOSE, POC    Collection Time: 09/27/20  8:46 PM   Result Value Ref Range    Glucose (POC) 124 (H) 65 - 100 mg/dL    Performed by Clemente Benites    GLUCOSE, POC    Collection Time: 09/28/20  7:13 AM   Result Value Ref Range    Glucose (POC) 155 (H) 65 - 100 mg/dL    Performed by Jo Ann Peguero    CBC WITH AUTOMATED DIFF    Collection Time: 09/28/20 10:45 AM   Result Value Ref Range    WBC 7.1 4.1 - 11.1 K/uL    RBC 3.43 (L) 4.10 - 5.70 M/uL    HGB 9.3 (L) 12.1 - 17.0 %    HCT 28.4 (L) 36.6 - 50.3 %    MCV 82.8 80.0 - 99.0 FL    MCH 27.1 26.0 - 34.0 PG    MCHC 32.7 30.0 - 36.5 g/dL    RDW 16.0 (H) 11.5 - 14.5 %    PLATELET 254 824 - 047 K/uL    MPV 11.1 8.9 - 12.9 FL    NEUTROPHILS PENDING %    LYMPHOCYTES PENDING %    MONOCYTES PENDING %    EOSINOPHILS PENDING %    BASOPHILS PENDING %    IMMATURE GRANULOCYTES PENDING %    ABS. NEUTROPHILS PENDING K/UL    ABS. LYMPHOCYTES PENDING K/UL    ABS. MONOCYTES PENDING K/UL    ABS. EOSINOPHILS PENDING K/UL    ABS. BASOPHILS PENDING K/UL    ABS. IMM. GRANS.  PENDING K/UL    DF PENDING    METABOLIC PANEL, BASIC    Collection Time: 09/28/20 10:45 AM   Result Value Ref Range    Sodium 132 (L) 136 - 145 mmol/L    Potassium 3.7 3.5 - 5.1 mmol/L    Chloride 96 (L) 97 - 108 mmol/L    CO2 28 21 - 32 mmol/L    Anion gap 8 5 - 15 mmol/L    Glucose 171 (H) 65 - 100 mg/dL    BUN 21 (H) 6 - 20 mg/dL    Creatinine 1.36 (H) 0.70 - 1.30 mg/dL    BUN/Creatinine ratio 15 12 - 20      GFR est AA >60 >60 ml/min/1.73m2    GFR est non-AA 50 (L) >60 ml/min/1.73m2    Calcium 8.0 (L) 8.5 - 10.1 mg/dL   LACTIC ACID Collection Time: 09/28/20 10:45 AM   Result Value Ref Range    Lactic acid 0.9 0.4 - 2.0 mmol/L   GLUCOSE, POC    Collection Time: 09/28/20 11:13 AM   Result Value Ref Range    Glucose (POC) 185 (H) 65 - 100 mg/dL    Performed by Abdoulaye Reagan          Assessment and plan:      (1) shock: likely cardiogenic and septic. Levophed and dobutamine. MAP 65. Order bcx to rule out possible infectious cause. Holding BP meds.      (2) septic shock: s/t UTI. levaquin 750 daily IV. LA until less than 2     (3) UTI: levaquin     (4) AECHFr and pEF with AICD: continue #1. Until off of levophed. Likely contributes to #1.     (5) PAD: atorvastatin, Plavix, , ezetimibe     (6) CYNDI on CKDIII: cardiorenal potential ATN given the hypotension. Pending today labs. Possibly improve after dobutamine. If worsens will involve nephrolgy.      (7) DMII : resume lantus 10 and SSI.      (8) CAD s/p stent : plavix. Complicates all aspects of care     (9) HTN : holding all BP meds.      DVT ppx: heparin    DISPO: DC tomorrow as per PT recommendations. Change levaquin to oral for another 7 days.      Signed By: Mari Bell MD     September 28, 2020

## 2020-09-28 NOTE — PROGRESS NOTES
Problem: Pressure Injury - Risk of  Goal: *Prevention of pressure injury  Description: Document John Scale and appropriate interventions in the flowsheet. Outcome: Progressing Towards Goal  Note: Pressure Injury Interventions:  Sensory Interventions: Float heels, Keep linens dry and wrinkle-free, Minimize linen layers, Pressure redistribution bed/mattress (bed type), Turn and reposition approx. every two hours (pillows and wedges if needed)    Moisture Interventions: Absorbent underpads, Apply protective barrier, creams and emollients, Internal/External urinary devices, Moisture barrier, Minimize layers    Activity Interventions: Pressure redistribution bed/mattress(bed type)    Mobility Interventions: Pressure redistribution bed/mattress (bed type), HOB 30 degrees or less, Float heels, Turn and reposition approx.  every two hours(pillow and wedges)    Nutrition Interventions: Document food/fluid/supplement intake    Friction and Shear Interventions: HOB 30 degrees or less, Foam dressings/transparent film/skin sealants, Apply protective barrier, creams and emollients, Lift sheet, Feet elevated on foot rest                Problem: Hypotension  Goal: *Blood pressure within specified parameters  Outcome: Progressing Towards Goal

## 2020-09-28 NOTE — PROGRESS NOTES
Physician Progress Note      PATIENT:               Ricardo Quintanilla  CSN #:                  [id number]  :                       1940  ADMIT DATE:       2020 4:39 PM  [address] [place] DATE:  RESPONDING  PROVIDER #:        Uma Crump MD          QUERY TEXT:    Dr. Kathryn De La Garza  Patient admitted with  H and P:  a bullous formation on left lower extremity just above the ankle, it is very big filled with fluid. Per Op note dated  documentation of debridement. To accurately reflect the procedure performed please document if debridement was excisional or nonexcisional and the deepest depth of tissue removed as down to and including: The medical record reflects the following:  Risk Factors:DM type 2, HTN  Clinical Indicators: op note , pre op and post op diagnosis of Blister of left lower leg, per wound care nurse , non staged wound, partial thickness  Treatment: wound care, debridement  Thank you  Adrian Guillermo RN CDI, CCS  Ext [phone]  Options provided:  -- Nonexcisional debridement of skin  -- Excisional debridement of skin  -- Nonexcisional debridement of subcutaneous tissue  -- Excisional debridement of subcutaneous tissue  -- Nonexcisional debridement of fascia  -- Excisional debridement of fascia  -- Nonexcisional debridement of muscle  -- Excisional debridement of muscle  -- Nonexcisional debridement of bone  -- Excisional debridement of bone  -- Other - I will add my own diagnosis  -- Disagree - Not applicable / Not valid  -- Disagree - Clinically unable to determine / Unknown  -- Refer to Clinical Documentation Reviewer    PROVIDER RESPONSE TEXT:    Non-excisional debridement of skin was performed of left ankle during procedure on .     Query created by: Chuckie Pino on 2020 12:17 PM      Electronically signed by:  Uma Crump MD 2020 2:03 PM

## 2020-09-29 LAB
GLUCOSE BLD STRIP.AUTO-MCNC: 117 MG/DL (ref 65–100)
GLUCOSE BLD STRIP.AUTO-MCNC: 133 MG/DL (ref 65–100)
GLUCOSE BLD STRIP.AUTO-MCNC: 217 MG/DL (ref 65–100)
GLUCOSE BLD STRIP.AUTO-MCNC: 226 MG/DL (ref 65–100)
PERFORMED BY, TECHID: ABNORMAL

## 2020-09-29 PROCEDURE — 74011250636 HC RX REV CODE- 250/636: Performed by: HOSPITALIST

## 2020-09-29 PROCEDURE — 77010033678 HC OXYGEN DAILY

## 2020-09-29 PROCEDURE — 74011250637 HC RX REV CODE- 250/637: Performed by: PHYSICIAN ASSISTANT

## 2020-09-29 PROCEDURE — 97162 PT EVAL MOD COMPLEX 30 MIN: CPT

## 2020-09-29 PROCEDURE — 97165 OT EVAL LOW COMPLEX 30 MIN: CPT

## 2020-09-29 PROCEDURE — 74011636637 HC RX REV CODE- 636/637: Performed by: HOSPITALIST

## 2020-09-29 PROCEDURE — 97530 THERAPEUTIC ACTIVITIES: CPT

## 2020-09-29 PROCEDURE — 97116 GAIT TRAINING THERAPY: CPT

## 2020-09-29 PROCEDURE — 65270000029 HC RM PRIVATE

## 2020-09-29 PROCEDURE — 94760 N-INVAS EAR/PLS OXIMETRY 1: CPT

## 2020-09-29 PROCEDURE — 82962 GLUCOSE BLOOD TEST: CPT

## 2020-09-29 PROCEDURE — 74011250637 HC RX REV CODE- 250/637: Performed by: HOSPITALIST

## 2020-09-29 RX ADMIN — HEPARIN SODIUM 5000 UNITS: 10000 INJECTION, SOLUTION INTRAVENOUS; SUBCUTANEOUS at 13:09

## 2020-09-29 RX ADMIN — FERROUS SULFATE TAB 325 MG (65 MG ELEMENTAL FE) 325 MG: 325 (65 FE) TAB at 08:00

## 2020-09-29 RX ADMIN — ATORVASTATIN CALCIUM 40 MG: 40 TABLET, FILM COATED ORAL at 21:06

## 2020-09-29 RX ADMIN — Medication 1 TABLET: at 09:16

## 2020-09-29 RX ADMIN — OXYCODONE HYDROCHLORIDE 5 MG: 5 TABLET ORAL at 09:16

## 2020-09-29 RX ADMIN — RENO CAPS 1 CAPSULE: 100; 1.5; 1.7; 20; 10; 1; 150; 5; 6 CAPSULE ORAL at 09:16

## 2020-09-29 RX ADMIN — PANTOPRAZOLE SODIUM 40 MG: 40 TABLET, DELAYED RELEASE ORAL at 07:30

## 2020-09-29 RX ADMIN — INSULIN LISPRO 3 UNITS: 100 INJECTION, SOLUTION INTRAVENOUS; SUBCUTANEOUS at 16:44

## 2020-09-29 RX ADMIN — INSULIN LISPRO 3 UNITS: 100 INJECTION, SOLUTION INTRAVENOUS; SUBCUTANEOUS at 11:48

## 2020-09-29 RX ADMIN — EZETIMIBE 10 MG: 10 TABLET ORAL at 09:16

## 2020-09-29 RX ADMIN — CLOPIDOGREL BISULFATE 75 MG: 75 TABLET ORAL at 09:16

## 2020-09-29 RX ADMIN — LEVOTHYROXINE SODIUM 88 MCG: 88 TABLET ORAL at 06:06

## 2020-09-29 RX ADMIN — HEPARIN SODIUM 5000 UNITS: 10000 INJECTION, SOLUTION INTRAVENOUS; SUBCUTANEOUS at 21:06

## 2020-09-29 RX ADMIN — FUROSEMIDE 40 MG: 40 TABLET ORAL at 09:16

## 2020-09-29 RX ADMIN — HEPARIN SODIUM 5000 UNITS: 10000 INJECTION, SOLUTION INTRAVENOUS; SUBCUTANEOUS at 06:06

## 2020-09-29 RX ADMIN — LEVOFLOXACIN 750 MG: 5 INJECTION, SOLUTION INTRAVENOUS at 13:09

## 2020-09-29 RX ADMIN — OXYCODONE HYDROCHLORIDE 5 MG: 5 TABLET ORAL at 01:34

## 2020-09-29 RX ADMIN — OXYCODONE HYDROCHLORIDE 5 MG: 5 TABLET ORAL at 21:09

## 2020-09-29 RX ADMIN — MULTIVITAMIN TABLET 1 TABLET: TABLET at 09:16

## 2020-09-29 NOTE — PROGRESS NOTES
Problem: Mobility Impaired (Adult and Pediatric)  Goal: *Acute Goals and Plan of Care (Insert Text)  Description: Pt will be I with LE HEP in 7 days. Pt will perform bed mobility with min A x1 in 7 days. Pt will perform transfers with min Ax1 in 7 days. Pt will amb 10-25 feet with LRAD safely with mod I in 7 days. Outcome: Not Met   PHYSICAL THERAPY EVALUATION  Patient: Mathew Donis (2 y.o. male)  Date: 9/29/2020  Primary Diagnosis: Heart failure ([address] [I50.9]  Acute exacerbation of CHF (congestive heart failure) ([facility]) [I50.9]  Adult failure to thrive [R62.7]  Nausea & vomiting [R11.2]  Hypotension [I95.9]        Precautions: fall, SOB with activity       ASSESSMENT  Based on the objective data described below, the patient presents with generalized weakness, impaired functional mobility, impaired amb, impaired balance, and decreased activity tolerance with c/o SOB with activity (currently on 2L O2 via NC, not on O2 previously). Pt seated EOB with OT upon PT arrival, agreeable to evaluation. Pt performed bed mobility with OT prior to PT entering room reported mod A, mod A x2 with increased time and several attempts to complete sit <> stand transfers. Pt amb 2 feet with gt belt, RW, and mod A; demonstrating short, shuffling, step to gt pattern with very slow goyo noted; no LOB or knee buckling but did require max verbal cues for safety and sequencing. Pt did fair with session today with noted and c/o SOB throughout session requiring increased time for all mobility and frequent rest breaks; RT made aware. Pt will benefit from continued skilled PT to address above deficits and return to PLOF. Current PT DC recommendation SNF due to pt requiring increased assistance and rest breaks for all mobility; per pt report he had been at rehab PTA and would like to return home after DC from hospital however pt would require 24/7 [facility] if DC home.         Current Level of Function Impacting Discharge (mobility/balance): impaired activity tolerance, increased need for assistance with functional mobility, and impaired amb. Functional Outcome Measure: The patient scored 8/28 on the Tinetti outcome measure which is indicative of high fall risk. Other factors to consider for discharge: severity of impairments and acute medical complications      Patient will benefit from skilled therapy intervention to address the above noted impairments. PLAN :  Recommendations and Planned Interventions: bed mobility training, transfer training, gait training, therapeutic exercises, patient and family training/education and therapeutic activities      Frequency/Duration: Patient will be followed by physical therapy:  5 times a week to address goals. Recommendation for discharge: (in order for the patient to meet his/her long term goals)  SNF    This discharge recommendation:  Has been made in collaboration with the attending provider and/or case management    IF patient discharges home will need the following DME: none         SUBJECTIVE:   Patient stated I'm ready to go home.     OBJECTIVE DATA SUMMARY:   HISTORY:    Past Medical History:   Diagnosis Date    Diabetes ([facility] [address]     Essential hypertension     Hyperlipidemia     Myocardial infarction ([facility] [address]     Pacemaker      Past Surgical History:   Procedure Laterality Date    HX CORONARY ARTERY BYPASS GRAFT      [place] INSJ/RPLCMT PERM DFB W/TRNSVNS LDS 1/DUAL CHMBR N/A 5/17/2019    pm-> ICD upgrade, RV lead, Medtronic performed by Kaleb Fisher MD at [address] CATH LAB       Personal factors and/or comorbidities impacting plan of care: decreased activity tolerance, increased reliance on oxygen     Home Situation  Home Environment: [address] [place] [address] Name: [facility]  # Steps to Enter: 0  One/Two Story Residence: One story  Living Alone: No  Support Systems: Family member(s)(Daugher and granddaughter)  Patient Expects to be Discharged to[de-identified] Skilled nursing facility  Current DME Used/Available at Home: Nydia Petit, constantineator, Other (comment)(sock aide)    PLOF: Pt required A for ADLS/IADLS, rollator with mobility prior to admission. EXAMINATION/PRESENTATION/DECISION MAKING:   Critical Behavior:  Neurologic State: Alert  Orientation Level: Oriented X4  Cognition: Follows commands, Appropriate decision making     Hearing: Auditory  Auditory Impairment: None  Skin:  Intact where visible, bandage noted on left ankle   Edema: mild to mod edema in bilateral LE  Range Of Motion:  AROM: Generally decreased, functional(grossly limited due to weakness)                       Strength:    Strength: Generally decreased, functional(grossly 3-/5)                    Tone & Sensation:                                  Coordination:     Vision:      Functional Mobility:  Bed Mobility: performed by OT prior to PT arrival    Transfers:  Sit to Stand: Moderate assistance;Assist x2  Stand to Sit: Moderate assistance;Assist x2        Bed to Chair: Moderate assistance;Assist x2              Balance:   Sitting: Intact; With support  Standing: Impaired; With support  Standing - Static: Fair  Standing - Dynamic : Fair  Ambulation/Gait Training:  Distance (ft): 2 Feet (ft)(bed to chair)     Ambulation - Level of Assistance: Moderate assistance;Assist x2     Gait Description (WDL): Exceptions to WDL  Gait Abnormalities: Shuffling gait; Step to gait        Base of Support: Widened     Speed/Zulma: Shuffled; Slow  Step Length: Left shortened;Right shortened                Therapeutic Exercises:   Not performed this session     Functional Measure:  Tinetti test:    Sitting Balance: 0  Arises: 0  Attempts to Rise: 0  Immediate Standing Balance: 1  Standing Balance: 1  Nudged: 0  Eyes Closed: 0  Turn 360 Degrees - Continuous/Discontinuous: 0  Turn 360 Degrees - Steady/Unsteady: 0  Sitting Down: 1  Balance Score: 3 Balance total score  Indication of Gait: 0  R Step Length/Height: 0  L Step Length/Height: 0  R Foot Clearance: 1  L Foot Clearance: 1  Step Symmetry: 1  Step Continuity: 0  Path: 1  Trunk: 1  Walking Time: 0  Gait Score: 5 Gait total score  Total Score: 8/28 Overall total score         Tinetti Tool Score Risk of Falls  <19 = High Fall Risk  19-24 = Moderate Fall Risk  25-28 = Low Fall Risk  Tinetti ME. Performance-Oriented Assessment of Mobility Problems in Elderly Patients. Hu 66; [id number]. (Scoring Description: PT Bulletin Feb. 10, 1993)    Older adults: Lazaro Muhammad et al, 2009; n = 1000 [facility] elderly evaluated with ABC, RAMÍREZ, ADL, and IADL)  · Mean RAMÍREZ score for males aged 69-68 years = 26.21(3.40)  · Mean RAMÍREZ score for females age 69-68 years = 25.16(4.30)  · Mean RAMÍREZ score for males over 80 years = 23.29(6.02)  · Mean RAMÍREZ score for females over 80 years = 17.20(8.32)     [facility] AM-PAC 6 Clicks         Basic Mobility Inpatient Short Form  How much difficulty does the patient currently have. .. Unable A Lot A Little None   1. Turning over in bed (including adjusting bedclothes, sheets and blankets)? [] 1   [x] 2   [] 3   [] 4   2. Sitting down on and standing up from a chair with arms ( e.g., wheelchair, bedside commode, etc.)   [] 1   [x] 2   [] 3   [] 4   3. Moving from lying on back to sitting on the side of the bed? [] 1   [x] 2   [] 3   [] 4          How much help from another person does the patient currently need. .. Total A Lot A Little None   4. Moving to and from a bed to a chair (including a wheelchair)? [] 1   [x] 2   [] 3   [] 4   5. Need to walk in hospital room? [x] 1   [] 2   [] 3   [] 4   6. Climbing 3-5 steps with a railing? [x] 1   [] 2   [] 3   [] 4   © 2007, Trustees of [facility], under license to [employer].  All rights reserved     Score:  Initial: 10/24 Most Recent: X (Date: -- )   Interpretation of Tool:  Represents activities that are increasingly more difficult (i.e. Bed mobility, Transfers, Gait).  Score 24 23 22-20 19-15 14-10 9-7 6   Modifier CH CI CJ CK CL CM CN            Physical Therapy Evaluation Charge Determination   History Examination Presentation Decision-Making   HIGH Complexity :3+ comorbidities / personal factors will impact the outcome/ POC  HIGH Complexity : 4+ Standardized tests and measures addressing body structure, function, activity limitation and / or participation in recreation  MEDIUM Complexity : Evolving with changing characteristics  Other outcome measures [facility] 6  HIGH       Based on the above components, the patient evaluation is determined to be of the following complexity level: MEDIUM    Pain Ratin/10    Activity Tolerance:   Fair, requires frequent rest breaks and observed SOB with activity  Please refer to the flowsheet for vital signs taken during this treatment. After treatment patient left in no apparent distress:   Sitting in chair, Call bell within reach and nsg updated     COMMUNICATION/EDUCATION:   The patients plan of care was discussed with: Occupational therapist and Registered nurse. Fall prevention education was provided and the patient/caregiver indicated understanding., Patient/family have participated as able in goal setting and plan of care. and Patient/family agree to work toward stated goals and plan of care.     Thank you for this referral.  Albania Mcconnellr   Time Calculation: 20 mins

## 2020-09-29 NOTE — PROGRESS NOTES
Skin assessment performed with Stephanie Quintana RN. Skin warm, dry, and color appropriate for ethnicity. Wound on left lower shin with dressing intact. No other wounds or open skin areas.

## 2020-09-29 NOTE — PROGRESS NOTES
CM met with patient to discuss DCP. Patient reports that he lives with his daughter, Al Zarate, and his granddaughter, however he was recently at [address] for 32 days and came to the hospital from the SNF. Patient reported that he does not want to return at this time and would like to go home at d/c with [facility]. Patient reports that he has a walker at home and a hospital bed and that his room and bathroom are on the first floor of the home. Patient does not have home O2 and if needed at d/c CM will need O2 order and resp test in order to order home O2. Patient gave choice for [facility] for [facility] as he had them in the past, and choice for [facility] in the event that he will need home O2. CM attempted to contact patient's daughter, Kassandra Mariscal [phone], to discuss DCP however no answer at this time and not able to leave . CM continues to follow.

## 2020-09-29 NOTE — PROGRESS NOTES
OCCUPATIONAL THERAPY EVALUATION  Patient: Seferino Jackson (60 y.o. male)  Date: 9/29/2020  Primary Diagnosis: Heart failure ([facility] [I50.9]  Acute exacerbation of CHF (congestive heart failure) ([facility]) [I50.9]  Adult failure to thrive [R62.7]  Nausea & vomiting [R11.2]  Hypotension [I95.9]        Precautions: Fall      ASSESSMENT  Based on the objective data described below, the patient presents with overall deficits in UE AROM/strength, static/dynamic standing balance, functional activity tolerance, and pain resulting in decline in ADL performance and functional transfers/mobility from PLOF. Pt currently requiring mod A for bed mobility, mod A x2 functional transfers, min A to don socks with use of sock aide and setup/supervision for basic grooming task EOB. Pt also on 2L O2 at this time requiring frequent rest breaks between tasks 2' to poor activity tolerance. He is pleasant and motivated to participate in his own self cares and would benefit from continued skilled OT services to address impairments and improve overall IND and safety with ADLs and functional transfers/mobility. Current Level of Function Impacting Discharge (ADLs/self-care): Currently on 2L O2 via NC, pt reports he does not wear O2 at PLOF; decreased functional activity tolerance and generalized strength. Other factors to consider for discharge: Family support, Pt reports participating in rehab prior to hospital admission. Patient will benefit from skilled therapy intervention to address the above noted impairments. PLAN :  Recommendations and Planned Interventions: self care training, functional mobility training, therapeutic exercise, balance training, therapeutic activities, endurance activities and patient education    Frequency/Duration: Patient will be followed by occupational therapy 4 times a week to address goals.     Recommendation for discharge: (in order for the patient to meet his/her long term goals)  SNF    This discharge recommendation:  Has been made in collaboration with the attending provider and/or case management       SUBJECTIVE:   Patient stated Bryan Larson was able to do my own self cares at home.     OBJECTIVE DATA SUMMARY:   HISTORY:   Past Medical History:   Diagnosis Date    [facility] ([facility] [address]     Essential hypertension     Hyperlipidemia     Myocardial infarction ([facility] [address]     Pacemaker      Past Surgical History:   Procedure Laterality Date    HX CORONARY ARTERY BYPASS GRAFT      [place] INSJ/RPLCMT PERM DFB W/TRNSVNS LDS 1/DUAL CHMBR N/A 5/17/2019    pm-> ICD upgrade, RV lead, Medtronic performed by Renu Nguyễn MD at [address] [facility] CATH LAB       Expanded or extensive additional review of patient history:     Home Situation  Home Environment: [address] [place] [address] Name: [facility]  # Steps to Enter: 0  One/Two Story Residence: One story  Living Alone: No  Support Systems: Family member(s)(Daugher and granddaughter)  Patient Expects to be Discharged to[de-identified] Skilled nursing facility  Current DME Used/Available at Home: Camila Gowers, rollator, Other (comment)(sock aide)    Hand dominance: Right    EXAMINATION OF PERFORMANCE DEFICITS:  Cognitive/Behavioral Status:  Neurologic State: Alert  Orientation Level: Oriented X4  Cognition: Follows commands; Appropriate decision making    Hearing: Auditory  Auditory Impairment: None    Vision/Perceptual:       WFL    Range of Motion:  B UE generally decreased, functional.    Strength:  B UE strength grossly 3+/5, generally decreased, functional.    Coordination:     Fine Motor Skills-Upper: Left Intact; Right Intact    Gross Motor Skills-Upper: Left Intact; Right Intact    Tone & Sensation:  WFL     Balance:  Sitting: Intact; With support  Standing: Impaired; With support  Standing - Static: Fair  Standing - Dynamic : Fair    Functional Mobility and Transfers for ADLs:  Bed Mobility:  Rolling:  Moderate assistance  Supine to Sit: Moderate assistance(increase time required)  Scooting: Moderate assistance    Transfers:  Sit to Stand: Moderate assistance;Assist x2  Stand to Sit: Moderate assistance;Assist x2  Bed to Chair: Moderate assistance;Assist x2    ADL Intervention and task modifications:     Grooming  Grooming Assistance: Set-up  Position Performed: Seated edge of bed  Washing Face: Set-up; Supervision    Lower Body Dressing Assistance  Socks: Minimum assistance(increased time required to complete task)  Position Performed: Seated edge of bed  Cues: Physical assistance;Verbal cues provided; Tactile cues provided  Adaptive Equipment Used: Sock aid    Therapeutic Exercise:  Pt educated on UE HEP to complete throughout the day to improve ROM and strength with good understanding verbalized and demonstrated. Functional Measure:    [address] [place] [address] AM-PACTM \"6 Clicks\"                                                       Daily Activity Inpatient Short Form  How much help from another person does the patient currently need. .. Total; A Lot A Little None   1. Putting on and taking off regular lower body clothing? []  1 [x]  2 []  3 []  4   2. Bathing (including washing, rinsing, drying)? []  1 [x]  2 []  3 []  4   3. Toileting, which includes using toilet, bedpan or urinal? [] 1 [x]  2 []  3 []  4   4. Putting on and taking off regular upper body clothing? []  1 []  2 [x]  3 []  4   5. Taking care of personal grooming such as brushing teeth? []  1 []  2 [x]  3 []  4   6. Eating meals? []  1 []  2 [x]  3 []  4   © 2007, Trustees of [address] [place] [id number], under license to [email or web address]. All rights reserved   Score: 15/24     Interpretation of Tool:  Represents clinically-significant functional categories (i.e. Activities of daily living).   Percentage of Impairment CH    0%   CI    1-19% CJ    20-39% CK    40-59% CL    60-79% CM    80-99% CN     100%   AMPA  Score 6-24 24 23 20-22 15-19 10-14 7-9 6       Occupational Therapy Evaluation Charge Determination   History Examination Decision-Making   LOW Complexity : Brief history review  LOW Complexity : 1-3 performance deficits relating to physical, cognitive , or psychosocial skils that result in activity limitations and / or participation restrictions  MEDIUM Complexity : Patient may present with comorbidities that affect occupational performnce. Miniml to moderate modification of tasks or assistance (eg, physical or verbal ) with assesment(s) is necessary to enable patient to complete evaluation       Based on the above components, the patient evaluation is determined to be of the following complexity level: LOW   Pain Ratin/10 low back pain reported at start of session. Activity Tolerance:   Fair    After treatment patient left in no apparent distress:    Sitting in chair, Heels elevated for pressure relief and Call bell within reach    COMMUNICATION/EDUCATION:   The patients plan of care was discussed with: Physical therapist and Registered nurse. Patient/family have participated as able in goal setting and plan of care. and Patient/family agree to work toward stated goals and plan of care. This patients plan of care is appropriate for delegation to CLAIRE. OT/PT sessions occurred together for increased patient and clinician safety as pt with poor activity tolerance and requires Ax2 for mobility at this time.     Thank you for this referral.  Gely Warren  Time Calculation: 35 mins   Problem: Self Care Deficits Care Plan (Adult)  Goal: *Acute Goals and Plan of Care (Insert Text)  Description: Pt will be SBA sup <> sit in prep for EOB ADLs  Pt will be Mod I grooming sitting EOB  Pt will be Mod I LE dressing sitting EOB/long sit  Pt will be SBA sit <>  prep for toileting LRAD  Pt will be SBA toileting/toilet transfer/cloth mgmt LRAD  Pt will be IND following UE HEP in prep for self care tasks     Outcome: Not Met

## 2020-09-29 NOTE — PROGRESS NOTES
Met with Mr. Susi Ramos this morning for heart failure management and worsening symptom recognition education. Reports he was in rehab prior to this admission but typically lives with his daughter and granddaughter. Education provided on management strategies (daily weight, limiting sodium intake, medication and follow up appointment compliance) and early worsening symptom recognition (rapid weight gain, edema, full/bloated feeling, activity intolerance, increased shortness of breath, cough, inability to lay flat). He verbalized understanding and was able to tell me high sodium foods to avoid. He reports weighing daily prior to his admission to rehab facility. States his daughter organizes his daily medications and ensures he takes as prescribed.

## 2020-09-29 NOTE — PROGRESS NOTES
Bedside and Verbal shift change report given to JEREMIAH Felix (oncoming nurse) by Brandon Vo RN (offgoing nurse). Report included the following information SBAR, Intake/Output, Recent Results and Cardiac Rhythm AV paced @70. Osie Ra 09/29/2020: Wound care done 1130am. Cleansed with normal saline, xerofoam petroleum placed, covered with gauze, and secured with kerlix. Bedside and Verbal shift change report given to Jeremiah Hayden (oncoming nurse) by Ming Coreas RN (offgoing nurse). Report included the following information SBAR, Intake/Output, MAR and Recent Results.

## 2020-09-29 NOTE — PROGRESS NOTES
Progress Note    Patient: Altaf Pena MRN: [id number]  SSN: [id number]    YOB: 1940  Age: 79 y.o. Sex: male      Admit Date: 9/22/2020    LOS: 6 days     Subjective:     80F, H/o AECHFr and pEF with AICD, PAD, CAD s/p stent with septic shock s.t UTI    Resolved. On levaquin, PO lasix.               Review of Systems   Constitutional: Positive for malaise/fatigue. HENT: Negative.    Eyes: Negative.    Respiratory: Positive for shortness of breath.    Cardiovascular: Positive for palpitations and orthopnea. Gastrointestinal: Positive for heartburn. Genitourinary: Negative.    Musculoskeletal: Negative.    Skin: Negative.    Neurological: Negative.    Endo/Heme/Allergies: Negative.    Psychiatric/Behavioral: Negative.      Prior to Admission Medications   Prescriptions Last Dose Informant Patient Reported? Taking? JANUVIA 100 mg tablet 9/22/2020  Yes Yes   Sig: TAKE ONE TABLET BY MOUTH ONE TIME DAILY   atorvastatin (LIPITOR) 40 mg tablet 9/22/2020  Yes Yes   Sig: TAKE ONE TABLET BY MOUTH ONE TIME DAILY AT BEDTIME   cholecalciferol (VITAMIN D3) 1,000 unit tablet 9/22/2020  Yes Yes   Sig: Take  by mouth daily. clopidogrel (PLAVIX) 75 mg tab 9/22/2020  Yes Yes   Sig: TAKE ONE TABLET BY MOUTH ONE TIME DAILY   cyanocobalamin (VITAMIN B12) 1,000 mcg/mL injection 9/22/2020  Yes Yes   Sig: INJECT 1 ML SUBCUTANEOUSLY ONCE MONTHLY   ezetimibe (ZETIA) 10 mg tablet 9/22/2020  Yes Yes   Sig: TAKE ONE TABLET BY MOUTH AT BEDTIME   ferrous sulfate 325 mg (65 mg iron) tablet 9/22/2020  Yes Yes   Sig: TAKE ONE TABLET BY MOUTH TWICE A DAY   furosemide (LASIX) 40 mg tablet 9/22/2020  Yes Yes   Sig: TAKE ONE TABLET BY MOUTH ONE TIME DAILY   insulin detemir (LEVEMIR FLEXTOUCH U-100 INSULN SC) 9/22/2020  Yes Yes   Sig: by SubCUTAneous route.  Indications: as needed for BS > 150 once a day then take 10 units   levothyroxine (SYNTHROID) 88 mcg tablet 9/22/2020  Yes Yes   Sig: TAKE ONE TABLET BY MOUTH EVERY MORNING   lisinopril (PRINIVIL, ZESTRIL) 5 mg tablet 2020  Yes Yes   Si.5 mg daily   metoprolol succinate (TOPROL-XL) 25 mg XL tablet 2020  Yes Yes   Sig: TAKE ONE TABLET BY MOUTH ONE TIME DAILY   nitroglycerin (NITROSTAT) 0.4 mg SL tablet 2020  Yes Yes   Si.4 mg by SubLINGual route every five (5) minutes as needed for Chest Pain. Up to 3 doses.    pantoprazole (PROTONIX) 40 mg tablet 2020  Yes Yes   Sig: TAKE ONE TABLET BY MOUTH EVERY MORNING   ranolazine ER (RANEXA) 1,000 mg 2020  Yes Yes   Sig: TAKE ONE TABLET BY MOUTH TWICE A DAY      Facility-Administered Medications: None       Current Facility-Administered Medications:     levoFLOXacin (LEVAQUIN) 750 mg in D5W IVPB, 750 mg, IntraVENous, Q48H, Nusrat Ortiz MD, Last Rate: 100 mL/hr at 20 1309, 750 mg at 20 1309    heparin porcine injection 5,000 Units, 5,000 Units, SubCUTAneous, Q8H, Maria Aleajndra Ortiz MD, 5,000 Units at 20 1309    insulin glargine (LANTUS) injection 10 Units, 10 Units, SubCUTAneous, QHS, Margurite Fabry, MD, 10 Units at 20 213    glucose chewable tablet 16 g, 4 Tab, Oral, PRN, Margurite Fabry, MD    glucagon (GLUCAGEN) injection 1 mg, 1 mg, IntraMUSCular, PRN, Margurite Fabry, MD    dextrose (D50W) injection syrg 12.5-25 g, 25-50 mL, IntraVENous, PRN, Margurite Fabry, MD    atorvastatin (LIPITOR) tablet 40 mg, 40 mg, Oral, QHS, Gallo Merino MD, 40 mg at 20    cholecalciferol (VITAMIN D3) (1000 Units /25 mcg) tablet 1 Tab, 1,000 Units, Oral, DAILY, Gallo Merino MD, 1 Tab at 20 0916    clopidogreL (PLAVIX) tablet 75 mg, 75 mg, Oral, DAILY, Gallo Merino MD, 75 mg at 20 0916    ezetimibe (ZETIA) tablet 10 mg, 10 mg, Oral, DAILY, Gallo Merino MD, 10 mg at 20 3611    ferrous sulfate tablet 325 mg, 1 Tab, Oral, DAILY WITH BREAKFAST, Gallo Merino MD, 325 mg at 20 0800    furosemide (LASIX) tablet 40 mg, 40 mg, Oral, DAILY, Chante Melendrez MD, 40 mg at 09/29/20 0916    [Held by provider] SITagliptin (JANUVIA) tablet tab 50 mg, 50 mg, Oral, DAILY, Chante Melendrez MD, Stopped at 09/25/20 0900    levothyroxine (SYNTHROID) tablet 88 mcg, 88 mcg, Oral, 6am, Chante Melendrez MD, 88 mcg at 09/29/20 0606    [Held by provider] lisinopriL (PRINIVIL, ZESTRIL) tablet 5 mg, 5 mg, Oral, DAILY, Chante Melendrez MD, Stopped at 09/25/20 0900    [Held by provider] metoprolol succinate (TOPROL-XL) XL tablet 25 mg, 25 mg, Oral, DAILY, Chante Melendrez MD, Stopped at 09/25/20 0900    pantoprazole (PROTONIX) tablet 40 mg, 40 mg, Oral, ACB, Chante Melendrez MD, 40 mg at 09/29/20 0730    [Held by provider] ranolazine ER (RANEXA) tablet 500 mg, 500 mg, Oral, BID, Chante Melendrez MD, Stopped at 09/25/20 0900    insulin lispro (HUMALOG) injection, , SubCUTAneous, AC&HS, Chante Melendrez MD, 3 Units at 09/29/20 1148    glucose chewable tablet 16 g, 4 Tab, Oral, PRN, Chante Melendrez MD    dextrose (D50W) injection syrg 12.5-25 g, 25-50 mL, IntraVENous, PRN, Chante Melendrez MD    glucagon (GLUCAGEN) injection 1 mg, 1 mg, IntraMUSCular, PRN, Chante Melendrez MD    multivitamin (ONE A DAY) tablet 1 Tab, 1 Tab, Oral, DAILY, Chante Melendrez MD, 1 Tab at 09/29/20 0916    B complex-vitaminC-folic acid (NEPHROCAP) cap, 1 Cap, Oral, DAILY, Chante Melendrez MD, 1 Cap at 09/29/20 0916    DOBUTamine (DOBUTREX) 500 mg/250 mL (2,000 mcg/mL) infusion, 10 mcg/kg/min, IntraVENous, CONTINUOUS, Al-Howaidi, Taoist, MD, Stopped at 09/26/20 0700    NOREPINephrine (LEVOPHED) 8 mg in 5% dextrose 250mL (32 mcg/mL) infusion, 5 mcg/min, IntraVENous, TITRATE, Shani Baker MD, Stopped at 09/26/20 1400    acetaminophen (TYLENOL) tablet 650 mg, 650 mg, Oral, Q4H PRN, Joaquina Sr, NP    oxyCODONE IR (ROXICODONE) tablet 5 mg, 5 mg, Oral, Q6H PRN, Jasvir Navarro PA-C, 5 mg at 09/29/20 0440    Objective:     Vitals:    09/29/20 0309 09/29/20 0729 09/29/20 0910 09/29/20 1120   BP: (!) 103/54 (!) 117/56  (!) 114/50   Pulse: 73 76  73   Resp: 18 18  20   Temp: 98.3 °F (36.8 °C) 99.1 °F (37.3 °C)  99.1 °F (37.3 °C)   SpO2: 95% 92% 93% 98%   Weight:       Height:            Intake and Output:  Current Shift: 09/29 0701 - 09/29 1900  In: 180 [P.O.:180]  Out: -   Last three shifts: 09/27 1901 - 09/29 0700  In: 400 [P.O.:400]  Out: 900 [Urine:900]      Physical Exam:   General : Appearance:  no respiratory distress noted  HEENT : PERRLA, normal oral mucosa, atraumatic normocephalic, Normal ear and nose. Neck : Supple, no JVD, no masses noted, no carotid bruit  Lungs : normal breath sounds.  No wheezing, no rales.  He is splinting on deep breathing. CVS : Rhythm rate regular, S1+, S2+, no murmur or gallop  Abdomen : Soft, nontender, no organomegaly, bowel sounds active  Extremities : No edema noted,  pedal pulses palpable  Musculoskeletal : Fair range of motion, no joint swelling or effusion, muscle tone and power appears normal,   Skin : Moist, warm, skin turgor, no pathological rash  Lymphatic : No cervical lymphadenopathy.   Neurological : Awake, alert, oriented to time place person.  No neurological deficits.    Psychiatric : Mood and affect appears appropriate to the situation.     Lab/Data Review:  Recent Results (from the past 24 hour(s))   GLUCOSE, POC    Collection Time: 09/28/20  2:58 PM   Result Value Ref Range    Glucose (POC) 269 (H) 65 - 100 mg/dL    Performed by Kimberly Staples    GLUCOSE, POC    Collection Time: 09/28/20  8:40 PM   Result Value Ref Range    Glucose (POC) 159 (H) 65 - 100 mg/dL    Performed by Kinsey Donaldson, POC    Collection Time: 09/29/20  7:38 AM   Result Value Ref Range    Glucose (POC) 133 (H) 65 - 100 mg/dL    Performed by Primo 30, POC    Collection Time: 09/29/20 11:17 AM   Result Value Ref Range Glucose (POC) 226 (H) 65 - 100 mg/dL    Performed by Desiree Aleman          Assessment and plan:      --shock: likely cardiogenic and septic. Levophed and dobutamine. Resolved.      --septic shock: s/t  E. Faecalis UTI. levaquin 750 daily IV.      --UTI: ucs=E Faecalis, continue levaquin     --AECHFr and pEF with AICD:     --PAD: atorvastatin, Plavix, , ezetimibe     --CYNDI on CKDIII: cardiorenal potential ATN given the hypotension. Pending today labs. Improving   --DMII : resume lantus 10 and SSI.      --CAD s/p stent/CABG : plavix.      -- HTN : holding all BP meds. PTA was on lisinopril 2.5mg every day, metoprolol xl 25 mg every day, ranexa.      DVT ppx: heparin    DISPO: Pending course, snf rec'd.      Signed By: Bonifacio Reilly MD     September 29, 2020

## 2020-09-30 LAB
GLUCOSE BLD STRIP.AUTO-MCNC: 178 MG/DL (ref 65–100)
GLUCOSE BLD STRIP.AUTO-MCNC: 184 MG/DL (ref 65–100)
GLUCOSE BLD STRIP.AUTO-MCNC: 189 MG/DL (ref 65–100)
GLUCOSE BLD STRIP.AUTO-MCNC: 291 MG/DL (ref 65–100)
PERFORMED BY, TECHID: ABNORMAL

## 2020-09-30 PROCEDURE — 74011250637 HC RX REV CODE- 250/637: Performed by: NURSE PRACTITIONER

## 2020-09-30 PROCEDURE — 74011250637 HC RX REV CODE- 250/637: Performed by: HOSPITALIST

## 2020-09-30 PROCEDURE — 97530 THERAPEUTIC ACTIVITIES: CPT

## 2020-09-30 PROCEDURE — 74011636637 HC RX REV CODE- 636/637: Performed by: HOSPITALIST

## 2020-09-30 PROCEDURE — 74011250637 HC RX REV CODE- 250/637: Performed by: PHYSICIAN ASSISTANT

## 2020-09-30 PROCEDURE — 65270000029 HC RM PRIVATE

## 2020-09-30 PROCEDURE — 82962 GLUCOSE BLOOD TEST: CPT

## 2020-09-30 PROCEDURE — 94760 N-INVAS EAR/PLS OXIMETRY 1: CPT

## 2020-09-30 PROCEDURE — 74011250636 HC RX REV CODE- 250/636: Performed by: HOSPITALIST

## 2020-09-30 PROCEDURE — 77010033678 HC OXYGEN DAILY

## 2020-09-30 RX ADMIN — HEPARIN SODIUM 5000 UNITS: 10000 INJECTION, SOLUTION INTRAVENOUS; SUBCUTANEOUS at 12:34

## 2020-09-30 RX ADMIN — INSULIN LISPRO 2 UNITS: 100 INJECTION, SOLUTION INTRAVENOUS; SUBCUTANEOUS at 08:52

## 2020-09-30 RX ADMIN — HEPARIN SODIUM 5000 UNITS: 10000 INJECTION, SOLUTION INTRAVENOUS; SUBCUTANEOUS at 20:56

## 2020-09-30 RX ADMIN — RENO CAPS 1 CAPSULE: 100; 1.5; 1.7; 20; 10; 1; 150; 5; 6 CAPSULE ORAL at 08:52

## 2020-09-30 RX ADMIN — FUROSEMIDE 40 MG: 40 TABLET ORAL at 08:52

## 2020-09-30 RX ADMIN — Medication 1 TABLET: at 08:52

## 2020-09-30 RX ADMIN — CLOPIDOGREL BISULFATE 75 MG: 75 TABLET ORAL at 08:52

## 2020-09-30 RX ADMIN — OXYCODONE HYDROCHLORIDE 5 MG: 5 TABLET ORAL at 05:18

## 2020-09-30 RX ADMIN — MULTIVITAMIN TABLET 1 TABLET: TABLET at 08:52

## 2020-09-30 RX ADMIN — ATORVASTATIN CALCIUM 40 MG: 40 TABLET, FILM COATED ORAL at 20:56

## 2020-09-30 RX ADMIN — ACETAMINOPHEN 650 MG: 325 TABLET, FILM COATED ORAL at 19:48

## 2020-09-30 RX ADMIN — INSULIN LISPRO 2 UNITS: 100 INJECTION, SOLUTION INTRAVENOUS; SUBCUTANEOUS at 11:30

## 2020-09-30 RX ADMIN — INSULIN LISPRO 5 UNITS: 100 INJECTION, SOLUTION INTRAVENOUS; SUBCUTANEOUS at 17:13

## 2020-09-30 RX ADMIN — HEPARIN SODIUM 5000 UNITS: 10000 INJECTION, SOLUTION INTRAVENOUS; SUBCUTANEOUS at 06:10

## 2020-09-30 RX ADMIN — FERROUS SULFATE TAB 325 MG (65 MG ELEMENTAL FE) 325 MG: 325 (65 FE) TAB at 08:52

## 2020-09-30 RX ADMIN — INSULIN GLARGINE 10 UNITS: 100 INJECTION, SOLUTION SUBCUTANEOUS at 20:57

## 2020-09-30 RX ADMIN — EZETIMIBE 10 MG: 10 TABLET ORAL at 08:52

## 2020-09-30 RX ADMIN — OXYCODONE HYDROCHLORIDE 5 MG: 5 TABLET ORAL at 12:35

## 2020-09-30 RX ADMIN — PANTOPRAZOLE SODIUM 40 MG: 40 TABLET, DELAYED RELEASE ORAL at 08:52

## 2020-09-30 RX ADMIN — LEVOTHYROXINE SODIUM 88 MCG: 88 TABLET ORAL at 06:10

## 2020-09-30 NOTE — PROGRESS NOTES
OCCUPATIONAL THERAPY TREATMENT  Patient: Marsha Rocha (39 y.o. male)  Date: 9/30/2020  Diagnosis: Heart failure ([facility] [address] [I50.9]  Acute exacerbation of CHF (congestive heart failure) ([facility] [address] [I50.9]  Adult failure to thrive [R62.7]  Nausea & vomiting [R11.2]  Hypotension [I95.9]   <principal problem not specified>       Precautions:    Chart, occupational therapy assessment, plan of care, and goals were reviewed. ASSESSMENT  Patient continues with skilled OT services and is progressing towards goals. Pt. Received semi-supine in bed and agreeable to session. Pt. performed supine-> sit Min A, pt demonstrated good sitting balance while sitting at EOB, sit->  prep for OOB ADL's, pt has decreased standing (dynamic /  static) endurance and tolerance. UE therex performed while sitting at EOB; pt's O2 89%- 91%; pt educated on PLB and demonstrated understanding. Pt able to take a few side steps at EOB and requested to sit back at EOB. Pt. Performed bed level grooming ( washing face and brushing teeth ) with set-up assistance. Pt. Educated on importance of OOB mobility and to decrease pain in lower back     Current Level of Function Impacting Discharge (ADLs): Currently on 2L O2 via NC, pt reports he does not wear O2 at PLOF; decreased functional activity tolerance and generalized strength. Other factors to consider for discharge: level at care/ support at home. PLAN :  Patient continues to benefit from skilled intervention to address the above impairments. Continue treatment per established plan of care.   to address goals  Recommend next OT session: encourage OOB functional mobility in prep for toilet transfer/ grooming at sink level, UE HEP    Recommendation for discharge: (in order for the patient to meet his/her long term goals)  Therapy up to 5 days/week in SNF setting    This discharge recommendation:  Has been made in collaboration with the attending provider and/or case management    IF patient discharges home will need the following DME: bedside commode, walker: rolling, and patient owns DME required for discharge( hospital bed)       SUBJECTIVE:   Patient stated  My lower back hurts    OBJECTIVE DATA SUMMARY:   Cognitive/Behavioral Status:  Neurologic State: Alert  Orientation Level: Oriented X4  Cognition: Appropriate decision making       Functional Mobility and Transfers for ADLs:  Bed Mobility:  Rolling: Minimum assistance  Supine to Sit: Minimum assistance  Sit to Supine: Minimum assistance  Scooting: Minimum assistance    Transfers:  Sit to Stand: Assist x2; Moderate assistance          Balance:  Sitting: Intact  Standing: Intact; With support  Standing - Static: Fair  Standing - Dynamic : Fair    ADL Intervention:       Grooming  Washing Face: Set-up; Supervision  Brushing Teeth: Set-up; Supervision      Lower Body Dressing Assistance  Socks: Total assistance (dependent)(w/ dressing LLE, while semi-supine in bed)      Therapeutic Exercises:   3x 10 reps     Pain:  5/10 face scale for lower back pain    Activity Tolerance:   Poor  Please refer to the flowsheet for vital signs taken during this treatment. After treatment patient left in no apparent distress:   Supine in bed, Call bell within reach, Bed / chair alarm activated, and Caregiver / family present    COMMUNICATION/COLLABORATION:   The patients plan of care was discussed with: Physical therapy assistant.      Aaliyah Cornelius  Time Calculation: 28 mins    Problem: Self Care Deficits Care Plan (Adult)  Goal: *Acute Goals and Plan of Care (Insert Text)  Description: Pt will be SBA sup <> sit in prep for EOB ADLs  Pt will be Mod I grooming sitting EOB  Pt will be Mod I LE dressing sitting EOB/long sit  Pt will be SBA sit <>  prep for toileting LRAD  Pt will be SBA toileting/toilet transfer/cloth mgmt LRAD  Pt will be IND following UE HEP in prep for self care tasks     Outcome: Progressing Towards Goal

## 2020-09-30 NOTE — PROGRESS NOTES
Problem: Pressure Injury - Risk of  Goal: *Prevention of pressure injury  Description: Document John Scale and appropriate interventions in the flowsheet. Outcome: Progressing Towards Goal  Note: Pressure Injury Interventions:  Sensory Interventions: Assess changes in LOC, Discuss PT/OT consult with provider, Keep linens dry and wrinkle-free, Minimize linen layers    Moisture Interventions: Minimize layers, Moisture barrier    Activity Interventions: PT/OT evaluation    Mobility Interventions: HOB 30 degrees or less, PT/OT evaluation    Nutrition Interventions: Document food/fluid/supplement intake    Friction and Shear Interventions: HOB 30 degrees or less, Foam dressings/transparent film/skin sealants, Apply protective barrier, creams and emollients, Lift sheet, Feet elevated on foot rest                Problem: Patient Education: Go to Patient Education Activity  Goal: Patient/Family Education  Outcome: Progressing Towards Goal     Problem: Falls - Risk of  Goal: *Absence of Falls  Description: Document Kristi Fall Risk and appropriate interventions in the flowsheet.   Outcome: Progressing Towards Goal  Note: Fall Risk Interventions:  Mobility Interventions: Bed/chair exit alarm, Patient to call before getting OOB, PT Consult for mobility concerns, Utilize walker, cane, or other assistive device         Medication Interventions: Bed/chair exit alarm, Patient to call before getting OOB, Teach patient to arise slowly    Elimination Interventions: Bed/chair exit alarm, Call light in reach, Patient to call for help with toileting needs              Problem: Patient Education: Go to Patient Education Activity  Goal: Patient/Family Education  Outcome: Progressing Towards Goal     Problem: Hypotension  Goal: *Blood pressure within specified parameters  Outcome: Progressing Towards Goal  Goal: *Fluid volume balance  Outcome: Progressing Towards Goal     Problem: Nutrition Deficit  Goal: *Optimize nutritional status  Outcome: Progressing Towards Goal     Problem: Patient Education: Go to Patient Education Activity  Goal: Patient/Family Education  Outcome: Progressing Towards Goal     Problem: Patient Education: Go to Patient Education Activity  Goal: Patient/Family Education  Outcome: Progressing Towards Goal

## 2020-09-30 NOTE — PROGRESS NOTES
Progress Note    Patient: Rc Moore MRN: [id number]  SSN: [id number]    YOB: 1940  Age: 79 y.o. Sex: male      Admit Date: 9/22/2020    LOS: 7 days     Subjective:     80F, H/o AECHFr and pEF with AICD, PAD, CAD s/p stent with septic shock s.t UTI    Resolved. On levaquin, PO lasix. Seen at bedside. Hypoxic to 86% on ra.             Review of Systems   Constitutional: Positive for malaise/fatigue. HENT: Negative.    Eyes: Negative.    Respiratory: Positive for shortness of breath.    Cardiovascular: Positive for palpitations and orthopnea. Gastrointestinal: Positive for heartburn. Genitourinary: Negative.    Musculoskeletal: Negative.    Skin: Negative.    Neurological: Negative.    Endo/Heme/Allergies: Negative.    Psychiatric/Behavioral: Negative.      Prior to Admission Medications   Prescriptions Last Dose Informant Patient Reported? Taking? JANUVIA 100 mg tablet 9/22/2020  Yes Yes   Sig: TAKE ONE TABLET BY MOUTH ONE TIME DAILY   atorvastatin (LIPITOR) 40 mg tablet 9/22/2020  Yes Yes   Sig: TAKE ONE TABLET BY MOUTH ONE TIME DAILY AT BEDTIME   cholecalciferol (VITAMIN D3) 1,000 unit tablet 9/22/2020  Yes Yes   Sig: Take  by mouth daily. clopidogrel (PLAVIX) 75 mg tab 9/22/2020  Yes Yes   Sig: TAKE ONE TABLET BY MOUTH ONE TIME DAILY   cyanocobalamin (VITAMIN B12) 1,000 mcg/mL injection 9/22/2020  Yes Yes   Sig: INJECT 1 ML SUBCUTANEOUSLY ONCE MONTHLY   ezetimibe (ZETIA) 10 mg tablet 9/22/2020  Yes Yes   Sig: TAKE ONE TABLET BY MOUTH AT BEDTIME   ferrous sulfate 325 mg (65 mg iron) tablet 9/22/2020  Yes Yes   Sig: TAKE ONE TABLET BY MOUTH TWICE A DAY   furosemide (LASIX) 40 mg tablet 9/22/2020  Yes Yes   Sig: TAKE ONE TABLET BY MOUTH ONE TIME DAILY   insulin detemir (LEVEMIR FLEXTOUCH U-100 INSULN SC) 9/22/2020  Yes Yes   Sig: by SubCUTAneous route.  Indications: as needed for BS > 150 once a day then take 10 units   levothyroxine (SYNTHROID) 88 mcg tablet 9/22/2020  Yes Yes   Sig: TAKE ONE TABLET BY MOUTH EVERY MORNING   lisinopril (PRINIVIL, ZESTRIL) 5 mg tablet 2020  Yes Yes   Si.5 mg daily   metoprolol succinate (TOPROL-XL) 25 mg XL tablet 2020  Yes Yes   Sig: TAKE ONE TABLET BY MOUTH ONE TIME DAILY   nitroglycerin (NITROSTAT) 0.4 mg SL tablet 2020  Yes Yes   Si.4 mg by SubLINGual route every five (5) minutes as needed for Chest Pain. Up to 3 doses.    pantoprazole (PROTONIX) 40 mg tablet 2020  Yes Yes   Sig: TAKE ONE TABLET BY MOUTH EVERY MORNING   ranolazine ER (RANEXA) 1,000 mg 2020  Yes Yes   Sig: TAKE ONE TABLET BY MOUTH TWICE A DAY      Facility-Administered Medications: None       Current Facility-Administered Medications:     levoFLOXacin (LEVAQUIN) 750 mg in D5W IVPB, 750 mg, IntraVENous, Q48H, Nusrat Ortiz MD, Last Rate: 100 mL/hr at 20 1309, 750 mg at 20 1309    heparin porcine injection 5,000 Units, 5,000 Units, SubCUTAneous, Q8H, Sandra Ortiz MD, 5,000 Units at 20 1234    insulin glargine (LANTUS) injection 10 Units, 10 Units, SubCUTAneous, QHS, Sandra Ortiz MD, Stopped at 20 2200    glucose chewable tablet 16 g, 4 Tab, Oral, PRN, Lucrecia Reyes MD    glucagon (GLUCAGEN) injection 1 mg, 1 mg, IntraMUSCular, PRN, Lucrecia Reyes MD    dextrose (D50W) injection syrg 12.5-25 g, 25-50 mL, IntraVENous, PRN, Lucrecia Reyes MD    atorvastatin (LIPITOR) tablet 40 mg, 40 mg, Oral, QHS, Harish Hatch MD, 40 mg at 20    cholecalciferol (VITAMIN D3) (1000 Units /25 mcg) tablet 1 Tab, 1,000 Units, Oral, DAILY, Harish Hatch MD, 1 Tab at 20 8421    clopidogreL (PLAVIX) tablet 75 mg, 75 mg, Oral, DAILY, Harish Hatch MD, 75 mg at 20 8409    ezetimibe (ZETIA) tablet 10 mg, 10 mg, Oral, DAILY, Harish Hatch MD, 10 mg at 20 3473    ferrous sulfate tablet 325 mg, 1 Tab, Oral, DAILY WITH BREAKFAST, Harish Hatch MD, 325 mg at 09/30/20 0852    furosemide (LASIX) tablet 40 mg, 40 mg, Oral, DAILY, Pricilla Chamberlain MD, 40 mg at 09/30/20 0852    [Held by provider] SITagliptin (JANUVIA) tablet tab 50 mg, 50 mg, Oral, DAILY, Pricilla Chamberlain MD, Stopped at 09/25/20 0900    levothyroxine (SYNTHROID) tablet 88 mcg, 88 mcg, Oral, 6am, Pricilla Chamberlain MD, 88 mcg at 09/30/20 0610    [Held by provider] lisinopriL (PRINIVIL, ZESTRIL) tablet 5 mg, 5 mg, Oral, DAILY, Pricilla Chamberlain MD, Stopped at 09/25/20 0900    [Held by provider] metoprolol succinate (TOPROL-XL) XL tablet 25 mg, 25 mg, Oral, DAILY, Pricilla Chamberlain MD, Stopped at 09/25/20 0900    pantoprazole (PROTONIX) tablet 40 mg, 40 mg, Oral, ACB, Pricilla Chamberlain MD, 40 mg at 09/30/20 0852    [Held by provider] ranolazine ER (RANEXA) tablet 500 mg, 500 mg, Oral, BID, Pricilla Chamberlain MD, Stopped at 09/25/20 0900    insulin lispro (HUMALOG) injection, , SubCUTAneous, AC&HS, Pricilla Chamberlain MD, 5 Units at 09/30/20 1713    glucose chewable tablet 16 g, 4 Tab, Oral, PRN, Pricilla Chamberlain MD    dextrose (D50W) injection syrg 12.5-25 g, 25-50 mL, IntraVENous, PRN, Pricilla Chamberlain MD    glucagon (GLUCAGEN) injection 1 mg, 1 mg, IntraMUSCular, PRN, Pricilla Chamberlain MD    multivitamin (ONE A DAY) tablet 1 Tab, 1 Tab, Oral, DAILY, Pricilla Chamberlain MD, 1 Tab at 09/30/20 8984    B complex-vitaminC-folic acid (NEPHROCAP) cap, 1 Cap, Oral, DAILY, Pricilla Chamberlain MD, 1 Cap at 09/30/20 0852    DOBUTamine (DOBUTREX) 500 mg/250 mL (2,000 mcg/mL) infusion, 10 mcg/kg/min, IntraVENous, CONTINUOUS, Shani Baker MD, Stopped at 09/26/20 0700    NOREPINephrine (LEVOPHED) 8 mg in 5% dextrose 250mL (32 mcg/mL) infusion, 5 mcg/min, IntraVENous, TITRATE, Shani Baker MD, Stopped at 09/26/20 1400    acetaminophen (TYLENOL) tablet 650 mg, 650 mg, Oral, Q4H PRN, Joesphine Ace, NP    oxyCODONE IR (ROXICODONE) tablet 5 mg, 5 mg, Oral, Q6H PRN, Georgina Sarina, PA-C, 5 mg at 09/30/20 1235    Objective:     Vitals:    09/30/20 0846 09/30/20 1031 09/30/20 1451 09/30/20 1617   BP:  (!) 123/57 (!) 123/56    Pulse:  64 65    Resp:  20 20    Temp:  98.2 °F (36.8 °C) 98.7 °F (37.1 °C)    SpO2: 96% 93% 98% (!) 89%   Weight:       Height:            Intake and Output:  Current Shift: 09/30 0701 - 09/30 1900  In: 562 [P. O.:562]  Out: 300 [Urine:300]  Last three shifts: 09/28 1901 - 09/30 0700  In: 180 [P.O.:180]  Out: 500 [Urine:500]      Physical Exam:   General : Appearance:  no respiratory distress noted  HEENT : EOMI, normal oral mucosa, atraumatic normocephalic, Normal ear and nose. Neck : Supple, no JVD, no masses noted, no carotid bruit  Lungs : normal breath sounds.  No wheezing, no rales.  He is splinting on deep breathing. CVS : Rhythm rate regular, S1+, S2+, no murmur or gallop  Abdomen : Soft, nontender, no organomegaly, bowel sounds active  Extremities : No edema noted,  pedal pulses palpable  Musculoskeletal : Fair range of motion, no joint swelling or effusion, muscle tone and power appears normal,   Skin : Moist, warm, skin turgor, no pathological rash  Lymphatic : No cervical lymphadenopathy.   Neurological : Awake, alert, oriented to time place person.  No neurological deficits.    Psychiatric : Mood and affect appears appropriate to the situation.     Lab/Data Review:  Recent Results (from the past 24 hour(s))   GLUCOSE, POC    Collection Time: 09/29/20  7:16 PM   Result Value Ref Range    Glucose (POC) 117 (H) 65 - 100 mg/dL    Performed by Mordecai Soulier    GLUCOSE, POC    Collection Time: 09/30/20  8:20 AM   Result Value Ref Range    Glucose (POC) 189 (H) 65 - 100 mg/dL    Performed by Summer Waldron    GLUCOSE, POC    Collection Time: 09/30/20 11:42 AM   Result Value Ref Range    Glucose (POC) 184 (H) 65 - 100 mg/dL    Performed by Primo 30, POC    Collection Time: 09/30/20  2:54 PM Result Value Ref Range    Glucose (POC) 291 (H) 65 - 100 mg/dL    Performed by Mauro Santamaria          Assessment and plan:      --shock: likely cardiogenic and septic. Levophed and dobutamine. Resolved.      --septic shock: s/t  E. Faecalis UTI. levaquin 750 daily     --UTI: ucs=E Faecalis, continue levaquin     --AECHFr and pEF with AICD:     --PAD: atorvastatin, Plavix, , ezetimibe     --CYNDI on CKDIII: cardiorenal potential ATN given the hypotension. Improving   --DMII :lantus 10 and SSI.      --CAD s/p stent/CABG : plavix.      -- HTN : holding all BP meds 2/2 low bp. PTA was on lisinopril 2.5mg every day, metoprolol xl 25 mg every day, ranexa. --Gait impairment: unable to ambulate safely, declines rehab, will need wheelchair for mobility/safety       DVT ppx: heparin    DISPO: Pending course, snf rec'd- cn input noted, pt family decline snf. [facility] w/ services anticipated and chidi need wheelchair. Assessing for home O2.      Signed By: Zerita Schirmer, MD     September 30, 2020

## 2020-09-30 NOTE — PROGRESS NOTES
CM received a call from one of patient's daughter, Daryl Sanchez, to discuss DCP. Ms. Daryl Sanchez confirmed that patient does live with another daughter, Trey Peterson, and there is another sister that assist in patient care. Ms. Daryl Sanchez confirmed that patient had been at [facility] for \"a long time now\" and that the family is prepared and want the patient to return home, which is what the patient is wishing for as well. Ms. Daryl Sanchez stated that between the three sisters and many adult grandchildren patient has 24/7 care and they have discussed who will provide care and when. Patient does have a hospital bed and walker however patient may benefit from a wheelchair as he is not able to ambulate, per daughter and patient. Patient may also need O2 at d/c, this was discussed with Alvin. CM continues to follow, CM notified attending of family request for a wheelchair.

## 2020-09-30 NOTE — PROGRESS NOTES
PHYSICAL THERAPY TREATMENT  Patient: Yuli Gallo (65 y.o. male)  Date: 9/30/2020  Diagnosis: Heart failure (HCC) [I50.9]  Acute exacerbation of CHF (congestive heart failure) ([facility] [I50.9]  Adult failure to thrive [R62.7]  Nausea & vomiting [R11.2]  Hypotension [I95.9]   <principal problem not specified>       Precautions:    Chart, physical therapy assessment, plan of care and goals were reviewed. ASSESSMENT  Patient continues with skilled PT services and is progressing towards goals. Patient motivated for PT and wants to return home however fatigue quickly and demos poor activity tolerance. Patient required min A for bed mobility and mod A for sit>stand transfers. Intact balance however req min A x2 for safety to ensure safe ambulation. Able to only take 1-2 steps forward and side steps EOB. Wide MONCHO noted and fatigue quickly after just a few steps. Educate LE therex, will cont to progress further next visit. Current Level of Function Impacting Discharge (mobility/balance): level of assist at home             PLAN :  Patient continues to benefit from skilled intervention to address the above impairments. Continue treatment per established plan of care. to address goals. Recommendation for discharge: (in order for the patient to meet his/her long term goals)  SNF recommendation but pt and family wants HHPT, rec HHPT with constant family assistance. This discharge recommendation:  Has been made in collaboration with the attending provider and/or case management    IF patient discharges home will need the following DME: rolling walker       SUBJECTIVE:   Patient stated my feet feel numb.     OBJECTIVE DATA SUMMARY:   Critical Behavior:  Neurologic State: Alert  Orientation Level: Oriented X4  Cognition: Appropriate decision making, Appropriate safety awareness     Functional Mobility Training:  Bed Mobility:  Rolling: Minimum assistance  Supine to Sit: Minimum assistance sit to supine-min A  Scooting: Minimum assistance    Transfers:  Sit to Stand: Assist x2; Moderate assistance  Stand to Sit: Minimum assistance;Assist x2    Balance:  Sitting: Intact  Standing: Intact; With support  Standing - Static: Fair  Standing - Dynamic : Fair    Ambulation/Gait Training:  Distance (ft): 2 Feet (ft)(side steps)  Ambulation - Level of Assistance: Minimal assistance;Assist x2  Gait Abnormalities: Shuffling gait  Base of Support: Widened  Speed/Zulma: Shuffled; Slow  Step Length: Right shortened;Left shortened      Therapeutic Exercises:   Performed 5x LAQ EOB. Educated on LE in bed therex including ankle pumps, heel slides, hip abd/add    Pain Rating:  C/o low back pain, rated low but didn't not specify number, faces scale approx 4/10    Activity Tolerance:   Fair, desaturates with exertion and requires oxygen, requires rest breaks, and observed SOB with activity  Please refer to the flowsheet for vital signs taken during this treatment. After treatment patient left in no apparent distress:   Supine in bed, Call bell within reach, and Caregiver / family present    COMMUNICATION/COLLABORATION:   The patients plan of care was discussed with: Occupational therapy assistant. Cotx with OT for increased safety and req 2 person last visit. Joseph Arevalo   Time Calculation: 23 mins        Problem: Mobility Impaired (Adult and Pediatric)  Goal: *Acute Goals and Plan of Care (Insert Text)  Description: Pt will be I with LE HEP in 7 days. Pt will perform bed mobility with min A x1 in 7 days. Pt will perform transfers with min Ax1 in 7 days. Pt will amb 10-25 feet with LRAD safely with mod I in 7 days.          Outcome: Progressing Towards Goal

## 2020-10-01 ENCOUNTER — APPOINTMENT (OUTPATIENT)
Dept: GENERAL RADIOLOGY | Age: 80
DRG: 871 | End: 2020-10-01
Attending: INTERNAL MEDICINE
Payer: MEDICARE

## 2020-10-01 LAB
ANION GAP SERPL CALC-SCNC: 6 MMOL/L (ref 5–15)
BASOPHILS # BLD: 0.1 K/UL (ref 0–0.1)
BASOPHILS NFR BLD: 1 % (ref 0–1)
BUN SERPL-MCNC: 26 MG/DL (ref 6–20)
BUN/CREAT SERPL: 22 (ref 12–20)
CA-I BLD-MCNC: 8.8 MG/DL (ref 8.5–10.1)
CHLORIDE SERPL-SCNC: 98 MMOL/L (ref 97–108)
CO2 SERPL-SCNC: 28 MMOL/L (ref 21–32)
CREAT SERPL-MCNC: 1.2 MG/DL (ref 0.7–1.3)
DIFFERENTIAL METHOD BLD: ABNORMAL
EOSINOPHIL # BLD: 0.1 K/UL (ref 0–0.4)
EOSINOPHIL NFR BLD: 1 % (ref 0–7)
ERYTHROCYTE [DISTWIDTH] IN BLOOD BY AUTOMATED COUNT: 16.1 % (ref 11.5–14.5)
GLUCOSE BLD STRIP.AUTO-MCNC: 173 MG/DL (ref 65–100)
GLUCOSE BLD STRIP.AUTO-MCNC: 209 MG/DL (ref 65–100)
GLUCOSE BLD STRIP.AUTO-MCNC: 233 MG/DL (ref 65–100)
GLUCOSE BLD STRIP.AUTO-MCNC: 244 MG/DL (ref 65–100)
GLUCOSE SERPL-MCNC: 238 MG/DL (ref 65–100)
HCT VFR BLD AUTO: 31.4 % (ref 36.6–50.3)
HGB BLD-MCNC: 10.5 % (ref 12.1–17)
IMM GRANULOCYTES # BLD AUTO: 0.5 K/UL (ref 0–0.04)
IMM GRANULOCYTES NFR BLD AUTO: 6 % (ref 0–0.5)
LYMPHOCYTES # BLD: 0.5 K/UL (ref 0.8–3.5)
LYMPHOCYTES NFR BLD: 6 % (ref 12–49)
MCH RBC QN AUTO: 27.1 PG (ref 26–34)
MCHC RBC AUTO-ENTMCNC: 33.4 G/DL (ref 30–36.5)
MCV RBC AUTO: 80.9 FL (ref 80–99)
MONOCYTES # BLD: 0.6 K/UL (ref 0–1)
MONOCYTES NFR BLD: 8 % (ref 5–13)
NEUTS SEG # BLD: 6.9 K/UL (ref 1.8–8)
NEUTS SEG NFR BLD: 84 % (ref 32–75)
NRBC # BLD: 0 K/UL (ref 0–0.01)
NRBC BLD-RTO: 0 PER 100 WBC
PERFORMED BY, TECHID: ABNORMAL
PLATELET # BLD AUTO: 441 K/UL (ref 150–400)
PMV BLD AUTO: 10.5 FL (ref 8.9–12.9)
POTASSIUM SERPL-SCNC: 3.4 MMOL/L (ref 3.5–5.1)
RBC # BLD AUTO: 3.88 M/UL (ref 4.1–5.7)
SODIUM SERPL-SCNC: 132 MMOL/L (ref 136–145)
WBC # BLD AUTO: 8.2 K/UL (ref 4.1–11.1)

## 2020-10-01 PROCEDURE — 74011636637 HC RX REV CODE- 636/637: Performed by: HOSPITALIST

## 2020-10-01 PROCEDURE — 65270000029 HC RM PRIVATE

## 2020-10-01 PROCEDURE — 74011250637 HC RX REV CODE- 250/637: Performed by: PHYSICIAN ASSISTANT

## 2020-10-01 PROCEDURE — 71045 X-RAY EXAM CHEST 1 VIEW: CPT

## 2020-10-01 PROCEDURE — 85025 COMPLETE CBC W/AUTO DIFF WBC: CPT

## 2020-10-01 PROCEDURE — 94760 N-INVAS EAR/PLS OXIMETRY 1: CPT

## 2020-10-01 PROCEDURE — 74011250637 HC RX REV CODE- 250/637: Performed by: HOSPITALIST

## 2020-10-01 PROCEDURE — 74011250637 HC RX REV CODE- 250/637: Performed by: NURSE PRACTITIONER

## 2020-10-01 PROCEDURE — 93005 ELECTROCARDIOGRAM TRACING: CPT

## 2020-10-01 PROCEDURE — 74011250637 HC RX REV CODE- 250/637: Performed by: INTERNAL MEDICINE

## 2020-10-01 PROCEDURE — 82962 GLUCOSE BLOOD TEST: CPT

## 2020-10-01 PROCEDURE — 77010033678 HC OXYGEN DAILY

## 2020-10-01 PROCEDURE — 36415 COLL VENOUS BLD VENIPUNCTURE: CPT

## 2020-10-01 PROCEDURE — 84145 PROCALCITONIN (PCT): CPT

## 2020-10-01 PROCEDURE — 80048 BASIC METABOLIC PNL TOTAL CA: CPT

## 2020-10-01 PROCEDURE — 74011250636 HC RX REV CODE- 250/636: Performed by: INTERNAL MEDICINE

## 2020-10-01 PROCEDURE — 74011250636 HC RX REV CODE- 250/636: Performed by: HOSPITALIST

## 2020-10-01 RX ORDER — FUROSEMIDE 10 MG/ML
40 INJECTION INTRAMUSCULAR; INTRAVENOUS ONCE
Status: DISCONTINUED | OUTPATIENT
Start: 2020-10-01 | End: 2020-10-01

## 2020-10-01 RX ORDER — MIDODRINE HYDROCHLORIDE 5 MG/1
5 TABLET ORAL
Status: DISCONTINUED | OUTPATIENT
Start: 2020-10-01 | End: 2020-10-01

## 2020-10-01 RX ORDER — MIDODRINE HYDROCHLORIDE 5 MG/1
10 TABLET ORAL
Status: DISCONTINUED | OUTPATIENT
Start: 2020-10-01 | End: 2020-10-01

## 2020-10-01 RX ORDER — MIDODRINE HYDROCHLORIDE 5 MG/1
10 TABLET ORAL
Status: DISCONTINUED | OUTPATIENT
Start: 2020-10-02 | End: 2020-10-12

## 2020-10-01 RX ORDER — POTASSIUM CHLORIDE 7.45 MG/ML
10 INJECTION INTRAVENOUS ONCE
Status: COMPLETED | OUTPATIENT
Start: 2020-10-01 | End: 2020-10-01

## 2020-10-01 RX ORDER — DOPAMINE HYDROCHLORIDE 160 MG/100ML
5 INJECTION, SOLUTION INTRAVENOUS CONTINUOUS
Status: DISCONTINUED | OUTPATIENT
Start: 2020-10-01 | End: 2020-10-03

## 2020-10-01 RX ADMIN — MIDODRINE HYDROCHLORIDE 5 MG: 5 TABLET ORAL at 16:58

## 2020-10-01 RX ADMIN — INSULIN GLARGINE 10 UNITS: 100 INJECTION, SOLUTION SUBCUTANEOUS at 20:45

## 2020-10-01 RX ADMIN — MIDODRINE HYDROCHLORIDE 5 MG: 5 TABLET ORAL at 12:24

## 2020-10-01 RX ADMIN — Medication 1 TABLET: at 08:37

## 2020-10-01 RX ADMIN — MIDODRINE HYDROCHLORIDE 10 MG: 5 TABLET ORAL at 01:48

## 2020-10-01 RX ADMIN — CLOPIDOGREL BISULFATE 75 MG: 75 TABLET ORAL at 08:38

## 2020-10-01 RX ADMIN — INSULIN LISPRO 2 UNITS: 100 INJECTION, SOLUTION INTRAVENOUS; SUBCUTANEOUS at 08:37

## 2020-10-01 RX ADMIN — OXYCODONE HYDROCHLORIDE 5 MG: 5 TABLET ORAL at 13:24

## 2020-10-01 RX ADMIN — LEVOFLOXACIN 750 MG: 5 INJECTION, SOLUTION INTRAVENOUS at 13:22

## 2020-10-01 RX ADMIN — HEPARIN SODIUM 5000 UNITS: 10000 INJECTION, SOLUTION INTRAVENOUS; SUBCUTANEOUS at 07:45

## 2020-10-01 RX ADMIN — RENO CAPS 1 CAPSULE: 100; 1.5; 1.7; 20; 10; 1; 150; 5; 6 CAPSULE ORAL at 08:37

## 2020-10-01 RX ADMIN — MIDODRINE HYDROCHLORIDE 10 MG: 5 TABLET ORAL at 07:40

## 2020-10-01 RX ADMIN — ACETAMINOPHEN 650 MG: 325 TABLET, FILM COATED ORAL at 09:06

## 2020-10-01 RX ADMIN — MULTIVITAMIN TABLET 1 TABLET: TABLET at 08:39

## 2020-10-01 RX ADMIN — EZETIMIBE 10 MG: 10 TABLET ORAL at 08:38

## 2020-10-01 RX ADMIN — INSULIN LISPRO 3 UNITS: 100 INJECTION, SOLUTION INTRAVENOUS; SUBCUTANEOUS at 12:24

## 2020-10-01 RX ADMIN — POTASSIUM CHLORIDE 10 MEQ: 7.46 INJECTION, SOLUTION INTRAVENOUS at 20:45

## 2020-10-01 RX ADMIN — PANTOPRAZOLE SODIUM 40 MG: 40 TABLET, DELAYED RELEASE ORAL at 07:40

## 2020-10-01 RX ADMIN — HEPARIN SODIUM 5000 UNITS: 10000 INJECTION, SOLUTION INTRAVENOUS; SUBCUTANEOUS at 20:46

## 2020-10-01 RX ADMIN — FERROUS SULFATE TAB 325 MG (65 MG ELEMENTAL FE) 325 MG: 325 (65 FE) TAB at 07:40

## 2020-10-01 RX ADMIN — INSULIN LISPRO 3 UNITS: 100 INJECTION, SOLUTION INTRAVENOUS; SUBCUTANEOUS at 16:58

## 2020-10-01 RX ADMIN — DOPAMINE HYDROCHLORIDE 5 MCG/KG/MIN: 160 INJECTION, SOLUTION INTRAVENOUS at 12:19

## 2020-10-01 RX ADMIN — ATORVASTATIN CALCIUM 40 MG: 40 TABLET, FILM COATED ORAL at 20:46

## 2020-10-01 RX ADMIN — HEPARIN SODIUM 5000 UNITS: 10000 INJECTION, SOLUTION INTRAVENOUS; SUBCUTANEOUS at 12:24

## 2020-10-01 RX ADMIN — LEVOTHYROXINE SODIUM 88 MCG: 88 TABLET ORAL at 07:45

## 2020-10-01 NOTE — PROGRESS NOTES
Progress Note    Patient: Charlie Sifuentes MRN: [id number]  SSN: [id number]    YOB: 1940  Age: 79 y.o. Sex: male      Admit Date: 9/22/2020    LOS: 8 days     Subjective:     80F, H/o AECHFr and pEF with AICD, PAD, CAD s/p stent with septic shock s.t UTI    Resolved. On levaquin, PO lasix. Seen at bedside. Alert and ariented, no sob or cp  Hypoxic to 86% on ra. Maintaing >92% on 2l. Poor appetite  Hypotensive earlier             Review of Systems   Constitutional: Positive for malaise/fatigue. HENT: Negative.    Eyes: Negative.    Respiratory: Positive for shortness of breath.    Cardiovascular: Positive for palpitations and orthopnea. Gastrointestinal: Positive for heartburn. Genitourinary: Negative.    Musculoskeletal: Negative.    Skin: Negative.    Neurological: Negative.    Endo/Heme/Allergies: Negative.    Psychiatric/Behavioral: Negative.      Prior to Admission Medications   Prescriptions Last Dose Informant Patient Reported? Taking? JANUVIA 100 mg tablet 9/22/2020  Yes Yes   Sig: TAKE ONE TABLET BY MOUTH ONE TIME DAILY   atorvastatin (LIPITOR) 40 mg tablet 9/22/2020  Yes Yes   Sig: TAKE ONE TABLET BY MOUTH ONE TIME DAILY AT BEDTIME   cholecalciferol (VITAMIN D3) 1,000 unit tablet 9/22/2020  Yes Yes   Sig: Take  by mouth daily. clopidogrel (PLAVIX) 75 mg tab 9/22/2020  Yes Yes   Sig: TAKE ONE TABLET BY MOUTH ONE TIME DAILY   cyanocobalamin (VITAMIN B12) 1,000 mcg/mL injection 9/22/2020  Yes Yes   Sig: INJECT 1 ML SUBCUTANEOUSLY ONCE MONTHLY   ezetimibe (ZETIA) 10 mg tablet 9/22/2020  Yes Yes   Sig: TAKE ONE TABLET BY MOUTH AT BEDTIME   ferrous sulfate 325 mg (65 mg iron) tablet 9/22/2020  Yes Yes   Sig: TAKE ONE TABLET BY MOUTH TWICE A DAY   furosemide (LASIX) 40 mg tablet 9/22/2020  Yes Yes   Sig: TAKE ONE TABLET BY MOUTH ONE TIME DAILY   insulin detemir (LEVEMIR FLEXTOUCH U-100 INSULN SC) 9/22/2020  Yes Yes   Sig: by SubCUTAneous route.  Indications: as needed for BS > 150 once a day then take 10 units   levothyroxine (SYNTHROID) 88 mcg tablet 2020  Yes Yes   Sig: TAKE ONE TABLET BY MOUTH EVERY MORNING   lisinopril (PRINIVIL, ZESTRIL) 5 mg tablet 2020  Yes Yes   Si.5 mg daily   metoprolol succinate (TOPROL-XL) 25 mg XL tablet 2020  Yes Yes   Sig: TAKE ONE TABLET BY MOUTH ONE TIME DAILY   nitroglycerin (NITROSTAT) 0.4 mg SL tablet 2020  Yes Yes   Si.4 mg by SubLINGual route every five (5) minutes as needed for Chest Pain. Up to 3 doses.    pantoprazole (PROTONIX) 40 mg tablet 2020  Yes Yes   Sig: TAKE ONE TABLET BY MOUTH EVERY MORNING   ranolazine ER (RANEXA) 1,000 mg 2020  Yes Yes   Sig: TAKE ONE TABLET BY MOUTH TWICE A DAY      Facility-Administered Medications: None       Current Facility-Administered Medications:     midodrine (PROAMATINE) tablet 10 mg, 10 mg, Oral, TID PRN, Saad Batista NP, 10 mg at 10/01/20 0740    midodrine (PROAMATINE) tablet 5 mg, 5 mg, Oral, TID WITH MEALS, Maurilio Fernandez MD, 5 mg at 10/01/20 1658    DOPamine (INTROPIN) 400 mg in dextrose 5% 250 mL infusion, 5 mcg/kg/min, IntraVENous, CONTINUOUS, Flor Mikel BURT MD, Last Rate: 18 mL/hr at 10/01/20 1219, 5 mcg/kg/min at 10/01/20 1219    potassium chloride 10 mEq in 100 ml IVPB, 10 mEq, IntraVENous, ONCE, Maurilio Fernandez MD    furosemide (LASIX) injection 40 mg, 40 mg, IntraVENous, ONCE, Maurilio Fernandez MD    levoFLOXacin (LEVAQUIN) 750 mg in D5W IVPB, 750 mg, IntraVENous, Q48H, Jim Ortiz MD, Last Rate: 100 mL/hr at 10/01/20 1322, 750 mg at 10/01/20 1322    heparin porcine injection 5,000 Units, 5,000 Units, SubCUTAneous, Q8H, Victoriano Ortiz MD, 5,000 Units at 10/01/20 1224    insulin glargine (LANTUS) injection 10 Units, 10 Units, SubCUTAneous, QHS, Hussein Mcneal MD, 10 Units at 20    glucose chewable tablet 16 g, 4 Tab, Oral, PRN, Hussein Mcneal MD    glucagon (GLUCAGEN) injection 1 mg, 1 mg, IntraMUSCular, PRN, Brent Borges MD    dextrose (D50W) injection syrg 12.5-25 g, 25-50 mL, IntraVENous, PRN, Brent Borges MD    atorvastatin (LIPITOR) tablet 40 mg, 40 mg, Oral, QHS, Clayton Lynch MD, 40 mg at 09/30/20 2056    cholecalciferol (VITAMIN D3) (1000 Units /25 mcg) tablet 1 Tab, 1,000 Units, Oral, DAILY, Clayton Lynch MD, 1 Tab at 10/01/20 0837    clopidogreL (PLAVIX) tablet 75 mg, 75 mg, Oral, DAILY, Clayton Lynch MD, 75 mg at 10/01/20 3939    ezetimibe (ZETIA) tablet 10 mg, 10 mg, Oral, DAILY, Clayton Lynch MD, 10 mg at 10/01/20 9449    ferrous sulfate tablet 325 mg, 1 Tab, Oral, DAILY WITH BREAKFAST, Clayton Lynch MD, 325 mg at 10/01/20 0740    furosemide (LASIX) tablet 40 mg, 40 mg, Oral, DAILY, Clayton Lynch MD, Stopped at 10/01/20 0900    [Held by provider] SITagliptin (JANUVIA) tablet tab 50 mg, 50 mg, Oral, DAILY, Clayton Lynch MD, Stopped at 09/25/20 0900    levothyroxine (SYNTHROID) tablet 88 mcg, 88 mcg, Oral, 6am, Clayton Lynch MD, 88 mcg at 10/01/20 0745    [Held by provider] lisinopriL (PRINIVIL, ZESTRIL) tablet 5 mg, 5 mg, Oral, DAILY, Clayton Lynch MD, Stopped at 09/25/20 0900    [Held by provider] metoprolol succinate (TOPROL-XL) XL tablet 25 mg, 25 mg, Oral, DAILY, Clayton Lynch MD, Stopped at 09/25/20 0900    pantoprazole (PROTONIX) tablet 40 mg, 40 mg, Oral, ACB, Clayton Lynch MD, 40 mg at 10/01/20 0740    [Held by provider] ranolazine ER (RANEXA) tablet 500 mg, 500 mg, Oral, BID, Clayton Lynch MD, Stopped at 09/25/20 0900    insulin lispro (HUMALOG) injection, , SubCUTAneous, AC&HS, Clayton Lynch MD, 3 Units at 10/01/20 1658    glucose chewable tablet 16 g, 4 Tab, Oral, PRN, Clayton Lynch MD    dextrose (D50W) injection syrg 12.5-25 g, 25-50 mL, IntraVENous, PRN, Clayton Lynch MD    glucagon (GLUCAGEN) injection 1 mg, 1 mg, IntraMUSCular, PRN, Aris Krishnamurthy MD    multivitamin (ONE A DAY) tablet 1 Tab, 1 Tab, Oral, DAILY, Aris Krishnamurthy MD, 1 Tab at 10/01/20 0839    B complex-vitaminC-folic acid (NEPHROCAP) cap, 1 Cap, Oral, DAILY, Aris Krishnamurthy MD, 1 Cap at 10/01/20 0837    acetaminophen (TYLENOL) tablet 650 mg, 650 mg, Oral, Q4H PRN, Liliya Nunes NP, 650 mg at 10/01/20 0906    oxyCODONE IR (ROXICODONE) tablet 5 mg, 5 mg, Oral, Q6H PRN, JEFFERSON Lubin-C, 5 mg at 10/01/20 1324    Objective:     Vitals:    10/01/20 1138 10/01/20 1321 10/01/20 1327 10/01/20 1459   BP: (!) 90/52 116/65  119/63   Pulse: 72 84  87   Resp: 20 20 20   Temp: 98.1 °F (36.7 °C) 97.9 °F (36.6 °C)  98.2 °F (36.8 °C)   SpO2: 99% 98% 99% 90%   Weight:       Height:            Intake and Output:  Current Shift: No intake/output data recorded. Last three shifts: 09/29 1901 - 10/01 0700  In: 1282 [P.O.:1282]  Out: 700 [Urine:700]      Physical Exam:   General : Appearance:  no respiratory distress noted  HEENT : EOMI, normal oral mucosa, atraumatic normocephalic, Normal ear and nose. Neck : Supple, no JVD, no masses noted, no carotid bruit  Lungs : normal breath sounds.  No wheezing, no rales.  He is splinting on deep breathing. CVS : Rhythm rate regular, S1+, S2+, no murmur or gallop  Abdomen : Soft, nontender, no organomegaly, bowel sounds active  Extremities : No edema noted,  pedal pulses palpable  Musculoskeletal : Fair range of motion, no joint swelling or effusion, muscle tone and power appears normal,   Skin : Moist, warm, skin turgor, no pathological rash  Lymphatic : No cervical lymphadenopathy.   Neurological : Awake, alert, oriented to time place person.  No neurological deficits.    Psychiatric : Mood and affect appears appropriate to the situation.     Lab/Data Review:  Recent Results (from the past 24 hour(s))   GLUCOSE, POC    Collection Time: 09/30/20  7:15 PM   Result Value Ref Range    Glucose (POC) 178 (H) 65 - 100 mg/dL Performed by GRISEL DIAMOND    GLUCOSE, POC    Collection Time: 10/01/20  7:13 AM   Result Value Ref Range    Glucose (POC) 173 (H) 65 - 100 mg/dL    Performed by Barb Bui    GLUCOSE, POC    Collection Time: 10/01/20 11:46 AM   Result Value Ref Range    Glucose (POC) 233 (H) 65 - 100 mg/dL    Performed by Barb Bui    EKG, 12 LEAD, SUBSEQUENT    Collection Time: 10/01/20  1:35 PM   Result Value Ref Range    Ventricular Rate 99 BPM    Atrial Rate 86 BPM    P-R Interval 136 ms    QRS Duration 120 ms    Q-T Interval 348 ms    QTC Calculation (Bezet) 446 ms    Calculated R Axis 169 degrees    Calculated T Axis -79 degrees    Diagnosis       Undetermined rhythm  Low voltage QRS  Right bundle branch block  Cannot rule out Inferior infarct , age undetermined  Marked ST abnormality, possible anteroseptal subendocardial injury  Abnormal ECG  When compared with ECG of 22-SEP-2020 19:04,  Current undetermined rhythm precludes rhythm comparison, needs review     METABOLIC PANEL, BASIC    Collection Time: 10/01/20  2:05 PM   Result Value Ref Range    Sodium 132 (L) 136 - 145 mmol/L    Potassium 3.4 (L) 3.5 - 5.1 mmol/L    Chloride 98 97 - 108 mmol/L    CO2 28 21 - 32 mmol/L    Anion gap 6 5 - 15 mmol/L    Glucose 238 (H) 65 - 100 mg/dL    BUN 26 (H) 6 - 20 mg/dL    Creatinine 1.20 0.70 - 1.30 mg/dL    BUN/Creatinine ratio 22 (H) 12 - 20      GFR est AA >60 >60 ml/min/1.73m2    GFR est non-AA 58 (L) >60 ml/min/1.73m2    Calcium 8.8 8.5 - 10.1 mg/dL   CBC WITH AUTOMATED DIFF    Collection Time: 10/01/20  2:05 PM   Result Value Ref Range    WBC 8.2 4.1 - 11.1 K/uL    RBC 3.88 (L) 4.10 - 5.70 M/uL    HGB 10.5 (L) 12.1 - 17.0 %    HCT 31.4 (L) 36.6 - 50.3 %    MCV 80.9 80.0 - 99.0 FL    MCH 27.1 26.0 - 34.0 PG    MCHC 33.4 30.0 - 36.5 g/dL    RDW 16.1 (H) 11.5 - 14.5 %    PLATELET 411 (H) 601 - 400 K/uL    MPV 10.5 8.9 - 12.9 FL    NRBC 0.0 0  WBC    ABSOLUTE NRBC 0.00 0.00 - 0.01 K/uL    NEUTROPHILS 84 (H) 32 - 75 %    LYMPHOCYTES 6 (L) 12 - 49 %    MONOCYTES 8 5 - 13 %    EOSINOPHILS 1 0 - 7 %    BASOPHILS 1 0 - 1 %    IMMATURE GRANULOCYTES 6 (H) 0.0 - 0.5 %    ABS. NEUTROPHILS 6.9 1.8 - 8.0 K/UL    ABS. LYMPHOCYTES 0.5 (L) 0.8 - 3.5 K/UL    ABS. MONOCYTES 0.6 0.0 - 1.0 K/UL    ABS. EOSINOPHILS 0.1 0.0 - 0.4 K/UL    ABS. BASOPHILS 0.1 0.0 - 0.1 K/UL    ABS. IMM. GRANS. 0.5 (H) 0.00 - 0.04 K/UL    DF AUTOMATED     GLUCOSE, POC    Collection Time: 10/01/20  3:53 PM   Result Value Ref Range    Glucose (POC) 244 (H) 65 - 100 mg/dL    Performed by Norma Osorio          Assessment and plan:      --shock: likely cardiogenic and septic. Levophed and dobutamine. Resolved. Off pressor/dobutamine     --septic shock: s/t  E. Faecalis UTI. levaquin 750 daily     --UTI: ucs=E Faecalis, continue levaquin     --AECHFr and pEF with AICD:     --PAD: atorvastatin, Plavix, , ezetimibe     --CYNDI on CKDIII: cardiorenal potential ATN given the hypotension. Resolved cyndi  --DMII :lantus 10 and SSI.      --CAD s/p stent/CABG : plavix.      -- HTN : holding all BP meds 2/2 low bp. PTA was on lisinopril 2.5mg every day, metoprolol xl 25 mg every day, ranexa. --Gait impairment: unable to ambulate safely, participate in adl/iadl and cannot safely maneuver walker or cane. He appears cognizant and safe to use wheelchair but weakness would limit use of standard wheelchair and I recommend a lightweight chair. Wc would improve pts participation and would assist caregivers in [facility]. Declines rehab, will need lightweight wheelchair for mobility/safety.       DVT ppx: heparin    DISPO: Pending course, snf rec'd- cn input noted, pt family decline snf. Nikole 77 w/ services anticipated and chidi need wheelchair. reassess for hme o2.     Signed By: Fortunato Zayas MD     October 1, 2020

## 2020-10-01 NOTE — PROGRESS NOTES
Pt blood pressure 97/57, notified on call Dr. Taj Back. Ordered to discontinue one time dose of IV lasix, and recheck BP in about 2 hours- if MAP is still less than 60 ordered to up the dopamine to 7.5mc an hour. Will continue to monitor.

## 2020-10-01 NOTE — PROGRESS NOTES
PT tx attempted at 1123 however pt with low BP of 86/56 and HR of 49. Will hold PT right now and follow up at a later time.

## 2020-10-01 NOTE — PROGRESS NOTES
OT tx attempted at 1123 however pt with low BP of 86/56 and HR of 49 thus will hold OT tx at this time. Will continue to follow patient and attempt OT at a later time when medically appropriate. Thank you.

## 2020-10-01 NOTE — ROUTINE PROCESS
Bedside shift change report given to Tl Humphrey RN (oncoming nurse) by Esther Sousa RN 
 (offgoing nurse). Report included the following information SBAR, MAR, Recent Results, Med Rec Status and Cardiac Rhythm paced.

## 2020-10-01 NOTE — PROGRESS NOTES
Low blood pressure-     Pt blood systolic 87 in right arm and 89 in left arm. Notified On call physician Heide PHELPS, ordered to recheck pt blood pressure in 2 hours since pt has had mult episodes of low blood pressure and to call if it has dropped. Will continue to monitor pt status.

## 2020-10-01 NOTE — PROGRESS NOTES
Order for wheelchair and home O2 have been sent to [facility], however patient is not to d/c today as planned, patient will require new testing and O2 order 48 hours prior to d/c.      CM awaiting attending documentation for wheelchair, Documentation needed was provided to attending. Patient will have co-pay's for O2 and wheelchair, CM attempted to contact patient's daughter, Rubi Kinsey  [phone], x's [phone] to discuss however there was no answer and CM unable to leave a voicemail. CM continues to follow.

## 2020-10-02 LAB
ANION GAP SERPL CALC-SCNC: 6 MMOL/L (ref 5–15)
ATRIAL RATE: 86 BPM
BUN SERPL-MCNC: 26 MG/DL (ref 6–20)
BUN/CREAT SERPL: 24 (ref 12–20)
CA-I BLD-MCNC: 9 MG/DL (ref 8.5–10.1)
CALCULATED R AXIS, ECG10: 169 DEGREES
CALCULATED T AXIS, ECG11: -79 DEGREES
CHLORIDE SERPL-SCNC: 97 MMOL/L (ref 97–108)
CO2 SERPL-SCNC: 27 MMOL/L (ref 21–32)
CREAT SERPL-MCNC: 1.1 MG/DL (ref 0.7–1.3)
CULTURE,CULT: NORMAL
DIAGNOSIS, 93000: NORMAL
GLUCOSE BLD STRIP.AUTO-MCNC: 214 MG/DL (ref 65–100)
GLUCOSE BLD STRIP.AUTO-MCNC: 227 MG/DL (ref 65–100)
GLUCOSE BLD STRIP.AUTO-MCNC: 240 MG/DL (ref 65–100)
GLUCOSE BLD STRIP.AUTO-MCNC: 249 MG/DL (ref 65–100)
GLUCOSE SERPL-MCNC: 192 MG/DL (ref 65–100)
MAGNESIUM SERPL-MCNC: 1.7 MG/DL (ref 1.6–2.4)
P-R INTERVAL, ECG05: 136 MS
PERFORMED BY, TECHID: ABNORMAL
POTASSIUM SERPL-SCNC: 3.6 MMOL/L (ref 3.5–5.1)
Q-T INTERVAL, ECG07: 348 MS
QRS DURATION, ECG06: 120 MS
QTC CALCULATION (BEZET), ECG08: 446 MS
SODIUM SERPL-SCNC: 130 MMOL/L (ref 136–145)
SPECIAL REQUESTS,SREQ: NORMAL
VENTRICULAR RATE, ECG03: 99 BPM

## 2020-10-02 PROCEDURE — 74011636637 HC RX REV CODE- 636/637: Performed by: HOSPITALIST

## 2020-10-02 PROCEDURE — 74011250637 HC RX REV CODE- 250/637: Performed by: NURSE PRACTITIONER

## 2020-10-02 PROCEDURE — 80048 BASIC METABOLIC PNL TOTAL CA: CPT

## 2020-10-02 PROCEDURE — 82962 GLUCOSE BLOOD TEST: CPT

## 2020-10-02 PROCEDURE — 83735 ASSAY OF MAGNESIUM: CPT

## 2020-10-02 PROCEDURE — 97530 THERAPEUTIC ACTIVITIES: CPT

## 2020-10-02 PROCEDURE — 74011250636 HC RX REV CODE- 250/636: Performed by: HOSPITALIST

## 2020-10-02 PROCEDURE — 65270000029 HC RM PRIVATE

## 2020-10-02 PROCEDURE — 74011250636 HC RX REV CODE- 250/636: Performed by: INTERNAL MEDICINE

## 2020-10-02 PROCEDURE — 74011250637 HC RX REV CODE- 250/637: Performed by: PHYSICIAN ASSISTANT

## 2020-10-02 PROCEDURE — 77010033678 HC OXYGEN DAILY

## 2020-10-02 PROCEDURE — 74011250637 HC RX REV CODE- 250/637: Performed by: HOSPITALIST

## 2020-10-02 PROCEDURE — 36415 COLL VENOUS BLD VENIPUNCTURE: CPT

## 2020-10-02 PROCEDURE — 94760 N-INVAS EAR/PLS OXIMETRY 1: CPT

## 2020-10-02 PROCEDURE — 74011250637 HC RX REV CODE- 250/637: Performed by: INTERNAL MEDICINE

## 2020-10-02 RX ORDER — DRONABINOL 2.5 MG/1
2.5 CAPSULE ORAL
Status: DISCONTINUED | OUTPATIENT
Start: 2020-10-02 | End: 2020-10-03

## 2020-10-02 RX ORDER — LISINOPRIL 5 MG/1
2.5 TABLET ORAL DAILY
Status: DISCONTINUED | OUTPATIENT
Start: 2020-10-02 | End: 2020-10-05

## 2020-10-02 RX ORDER — METOPROLOL SUCCINATE 25 MG/1
12.5 TABLET, EXTENDED RELEASE ORAL DAILY
Status: DISCONTINUED | OUTPATIENT
Start: 2020-10-02 | End: 2020-10-05

## 2020-10-02 RX ADMIN — INSULIN LISPRO 3 UNITS: 100 INJECTION, SOLUTION INTRAVENOUS; SUBCUTANEOUS at 16:41

## 2020-10-02 RX ADMIN — DOPAMINE HYDROCHLORIDE 5 MCG/KG/MIN: 160 INJECTION, SOLUTION INTRAVENOUS at 03:03

## 2020-10-02 RX ADMIN — INSULIN LISPRO 3 UNITS: 100 INJECTION, SOLUTION INTRAVENOUS; SUBCUTANEOUS at 09:03

## 2020-10-02 RX ADMIN — MULTIVITAMIN TABLET 1 TABLET: TABLET at 08:55

## 2020-10-02 RX ADMIN — EZETIMIBE 10 MG: 10 TABLET ORAL at 08:54

## 2020-10-02 RX ADMIN — MIDODRINE HYDROCHLORIDE 10 MG: 5 TABLET ORAL at 08:55

## 2020-10-02 RX ADMIN — OXYCODONE HYDROCHLORIDE 5 MG: 5 TABLET ORAL at 03:36

## 2020-10-02 RX ADMIN — HEPARIN SODIUM 5000 UNITS: 10000 INJECTION, SOLUTION INTRAVENOUS; SUBCUTANEOUS at 20:48

## 2020-10-02 RX ADMIN — MIDODRINE HYDROCHLORIDE 10 MG: 5 TABLET ORAL at 16:41

## 2020-10-02 RX ADMIN — HEPARIN SODIUM 5000 UNITS: 10000 INJECTION, SOLUTION INTRAVENOUS; SUBCUTANEOUS at 07:43

## 2020-10-02 RX ADMIN — ACETAMINOPHEN 650 MG: 325 TABLET, FILM COATED ORAL at 20:47

## 2020-10-02 RX ADMIN — INSULIN LISPRO 2 UNITS: 100 INJECTION, SOLUTION INTRAVENOUS; SUBCUTANEOUS at 20:46

## 2020-10-02 RX ADMIN — Medication 1 TABLET: at 08:55

## 2020-10-02 RX ADMIN — MIDODRINE HYDROCHLORIDE 10 MG: 5 TABLET ORAL at 11:22

## 2020-10-02 RX ADMIN — LEVOTHYROXINE SODIUM 88 MCG: 88 TABLET ORAL at 07:43

## 2020-10-02 RX ADMIN — ACETAMINOPHEN 650 MG: 325 TABLET, FILM COATED ORAL at 09:03

## 2020-10-02 RX ADMIN — ATORVASTATIN CALCIUM 40 MG: 40 TABLET, FILM COATED ORAL at 20:47

## 2020-10-02 RX ADMIN — DOPAMINE HYDROCHLORIDE 5 MCG/KG/MIN: 160 INJECTION, SOLUTION INTRAVENOUS at 20:43

## 2020-10-02 RX ADMIN — HEPARIN SODIUM 5000 UNITS: 10000 INJECTION, SOLUTION INTRAVENOUS; SUBCUTANEOUS at 14:28

## 2020-10-02 RX ADMIN — INSULIN GLARGINE 10 UNITS: 100 INJECTION, SOLUTION SUBCUTANEOUS at 20:47

## 2020-10-02 RX ADMIN — CLOPIDOGREL BISULFATE 75 MG: 75 TABLET ORAL at 08:54

## 2020-10-02 RX ADMIN — PANTOPRAZOLE SODIUM 40 MG: 40 TABLET, DELAYED RELEASE ORAL at 09:03

## 2020-10-02 RX ADMIN — FERROUS SULFATE TAB 325 MG (65 MG ELEMENTAL FE) 325 MG: 325 (65 FE) TAB at 08:55

## 2020-10-02 RX ADMIN — RENO CAPS 1 CAPSULE: 100; 1.5; 1.7; 20; 10; 1; 150; 5; 6 CAPSULE ORAL at 08:54

## 2020-10-02 RX ADMIN — INSULIN LISPRO 3 UNITS: 100 INJECTION, SOLUTION INTRAVENOUS; SUBCUTANEOUS at 11:22

## 2020-10-02 NOTE — PROGRESS NOTES
Comprehensive Nutrition Assessment    Type and Reason for Visit: Reassess    Nutrition Recommendations/Plan:   Continue Diabetic diet  Continue Ensure High Protein TID    Nutrition Assessment:  Previously in ICU for pressors. BP now generally stable. Transferred to med floor 9/28. Plans for d/c, held 2/2 requiring retesting for COVID and O2 order. Spoke with RN who reports fair appetite, consuming 50-75% of all trays. Pt reported appetite is good at this time. Consuming 1-2 full Ensure HP daily. Labs: Na 130, , BG GPOC 227, H/H 10.5/31.4. Meds: levothyroxine, statin, heparin, insulin, renal MVI, D3, FeSu, PPI    Malnutrition Assessment:  Malnutrition Status:  Mild malnutrition    Context:  Acute illness     Findings of the 6 clinical characteristics of malnutrition:   Energy Intake:  1 - 75% or less of est energy req for 7 or more days  Weight Loss:  No significant weight loss     Body Fat Loss:  No significant body fat loss,     Muscle Mass Loss:  No significant muscle mass loss,    Fluid Accumulation:  No significant fluid accumulation,        Estimated Daily Nutrient Needs:  Energy (kcal):  1925kcal (20kcal/kg)  Protein (g):  96kg (1.0g/kg)       Fluid (ml/day):  1925mL (1mL/kcal)    Nutrition Related Findings:  RN reports no BM today; denies constipation. Significant ascites on previous assessment- no longer documented; 1+ bilat leg and arm edema. Pt previously denied dysphagia, n/v, or c/d. Wounds:    Full thickness(LLL, s/p I&D)       Current Nutrition Therapies:  DIET DIABETIC CONSISTENT CARB Regular  DIET NUTRITIONAL SUPPLEMENTS Breakfast, Dinner, Lunch; Ensure High Protein    Anthropometric Measures:  · Height:  5' 6\" (167.6 cm)  · Current Body Wt:  93.8 kg (206 lb 12.7 oz)(9/25)   · Usual Body Wt:  184 lb 15.5 oz     · Ideal Body Wt:  142 lbs:  145.6 %   · BMI Category:  Obese class 1 (BMI 30.0-34. 9)       Nutrition Diagnosis:   · Inadequate energy intake related to (poor appetite (improving)) as evidenced by intake 51-75%      Nutrition Interventions:   Food and/or Nutrient Delivery: Continue current diet, Continue oral nutrition supplement  Nutrition Education and Counseling: No recommendations at this time  Coordination of Nutrition Care: Continued inpatient monitoring    Goals:  Meet >50% EENs via PO  Wt loss 1kg +/- 0.5kg per week  BG <180mg/dL  Maintain skin integrity       Nutrition Monitoring and Evaluation:   Behavioral-Environmental Outcomes:   N/A  Food/Nutrient Intake Outcomes: Food and nutrient intake  Physical Signs/Symptoms Outcomes: Skin, Weight, Biochemical data    Discharge Planning:     Too soon to determine     Electronically signed by Sanjay De Oliveira on 10/2/2020 at 3:43 PM    Contact: PENG

## 2020-10-02 NOTE — PROGRESS NOTES
PHYSICAL THERAPY TREATMENT  Patient: Seferino Jackson (77 y.o. male)  Date: 10/2/2020  Diagnosis: [facility] [I50.9]  Acute exacerbation of CHF (congestive heart failure) ([facility] [I50.9]  Adult failure to thrive [R62.7]  Nausea & vomiting [R11.2]  Hypotension [I95.9]   <principal problem not specified>       Precautions:    Chart, physical therapy assessment, plan of care and goals were reviewed. ASSESSMENT  Patient continues with skilled PT services and is progressing towards goals. Patient was able to perform bed mobility with minimal assistance today and was able to perform gait to the chair with RW. Patient did req mod A for sit to stand from bed and mod A x2 from the chair while donning brief. Patient demos quick fatigue but was able to take steps well after getting up to a stand with RW. Educated LE therex and pt verbalized understanding. Will continue to progress towards PT goals. Current Level of Function Impacting Discharge (mobility/balance): safety and support at home           PLAN :  Patient continues to benefit from skilled intervention to address the above impairments. Continue treatment per established plan of care. to address goals. Recommendation for discharge: (in order for the patient to meet his/her long term goals)  Therapy up to 5 days/week in SNF setting    This discharge recommendation:  Has been made in collaboration with the attending provider and/or case management    IF patient discharges home will need the following DME: bedside commode, hospital bed, and rolling walker       SUBJECTIVE:   Patient stated \"I am feeling a little better today\".     OBJECTIVE DATA SUMMARY:   Critical Behavior:  Neurologic State: Alert  Orientation Level: Oriented X4  Cognition: Appropriate decision making     Functional Mobility Training:  Bed Mobility:  Rolling: Contact guard assistance  Supine to Sit: Contact guard assistance     Scooting: Contact guard assistance        Transfers:  Sit to Stand: Moderate assistance;Assist x2  Stand to Sit: Moderate assistance;Assist x2        Bed to Chair: Minimum assistance     Balance:  Sitting: Intact  Standing: With support; Impaired  Standing - Static: Fair  Standing - Dynamic : Fair  Ambulation/Gait Training:  Distance (ft): 3 Feet (ft)     Ambulation - Level of Assistance: Minimal assistance;Assist x2        Gait Abnormalities: Shuffling gait        Base of Support: Widened     Speed/Zulma: Slow;Shuffled  Step Length: Right shortened;Left shortened         Pain Ratin/10 low back    Activity Tolerance:   Fair and requires rest breaks  Please refer to the flowsheet for vital signs taken during this treatment. After treatment patient left in no apparent distress:   Sitting in chair and Call bell within reach    COMMUNICATION/COLLABORATION:   The patients plan of care was discussed with: Occupational therapist and Registered nurse. Cotx with OT for increased safety. Regla Arevalo   Time Calculation: 23 mins         Problem: Mobility Impaired (Adult and Pediatric)  Goal: *Acute Goals and Plan of Care (Insert Text)  Description: Pt will be I with LE HEP in 7 days. Pt will perform bed mobility with min A x1 in 7 days. Pt will perform transfers with min Ax1 in 7 days. Pt will amb 10-25 feet with LRAD safely with mod I in 7 days.          Outcome: Progressing Towards Goal

## 2020-10-02 NOTE — PROGRESS NOTES
CM attempted to reach patient's daughter, Caleb Sandoval  [phone], to discuss co-pay for wheelchair and O2, however no answer at this time and not able to leave VM. CM has reached out to [facility] to see if they have another family member number on file as patient is current with them at this time. CM awaiting answer, continues to follow.

## 2020-10-02 NOTE — PROGRESS NOTES
OCCUPATIONAL THERAPY TREATMENT  Patient: Diane Crawford (62 y.o. male)  Date: 10/2/2020  Diagnosis: Heart failure ([facility] [I50.9]  Acute exacerbation of CHF (congestive heart failure) ([facility] [I50.9]  Adult failure to thrive [R62.7]  Nausea & vomiting [R11.2]  Hypotension [I95.9]   <principal problem not specified>       Precautions: fall  Chart, occupational therapy assessment, plan of care, and goals were reviewed. ASSESSMENT  Patient continues with skilled OT services and is progressing towards goals. Patient sitting at EOB with PT upon entry and agreeable to co treatment for OOB mobility. Pt performed sit <-> stand and bed to chair transfer. Once sitting in the chair pt requesting to use urinal with setup assistance. Patient brief was soiled and requiring max assist to doff soiled brief and replace with new brief while standing at recliner. Continue to progress toward patient goals. Recommend discharge to SNF. Current Level of Function Impacting Discharge (ADLs): Requiring assist for all mobility at this time and ADLs    Other factors to consider for discharge: family support and severity of deficits         PLAN :  Patient continues to benefit from skilled intervention to address the above impairments. Continue treatment per established plan of care. to address goals. Recommend with staff: up to chair for meals and participation in daily grooming routine    Recommend next OT session: toilet transfer    Recommendation for discharge: (in order for the patient to meet his/her long term goals)  Therapy up to 5 days/week in SNF setting    This discharge recommendation:  Has been made in collaboration with the attending provider and/or case management    IF patient discharges home will need the following DME: patient owns DME required for discharge       SUBJECTIVE:   Patient stated I feel like I need to pee.     OBJECTIVE DATA SUMMARY:   Cognitive/Behavioral Status:  Neurologic State: Alert  Orientation Level: Oriented X4  Cognition: Appropriate decision making    Functional Mobility and Transfers for ADLs:  Bed Mobility:  Rolling: Contact guard assistance  Supine to Sit: Contact guard assistance  Scooting: Contact guard assistance    Transfers:  Sit to Stand: Moderate assistance;Assist x2  Bed to Chair: Minimum assistance    Balance:  Sitting: Intact  Standing: With support; Impaired  Standing - Static: Fair  Standing - Dynamic : Fair    ADL Intervention:    Lower Body Dressing Assistance  Dressing Assistance: Maximum assistance  Protective Undergarmet: Maximum assistance  Socks: Total assistance (dependent)    Toileting  Toileting Assistance: Set-up(to use urinal)  Bladder Hygiene: Set-up    Therapeutic Exercises:   Instructed to complete UE AROM throughout day    Pain:  5/10 low back pain    Activity Tolerance:   Fair and requires rest breaks  Please refer to the flowsheet for vital signs taken during this treatment. After treatment patient left in no apparent distress:   Sitting in chair, Call bell within reach, and nursing aware    COMMUNICATION/COLLABORATION:   The patients plan of care was discussed with: Physical therapy assistant and Registered nurse. OT/PT sessions occurred together for increased patient and clinician safety as pt requires A of 2 for mobility at this time.     Problem: Self Care Deficits Care Plan (Adult)  Goal: *Acute Goals and Plan of Care (Insert Text)  Description: Pt will be SBA sup <> sit in prep for EOB ADLs  Pt will be Mod I grooming sitting EOB  Pt will be Mod I LE dressing sitting EOB/long sit  Pt will be SBA sit <>  prep for toileting LRAD  Pt will be SBA toileting/toilet transfer/cloth mgmt LRAD  Pt will be IND following UE HEP in prep for self care tasks     Outcome: Progressing Towards Goal    Sherlyn Demarco  Time Calculation: 17 mins

## 2020-10-02 NOTE — PROGRESS NOTES
Progress Note    Patient: Eddie Laura MRN: [id number]  SSN: [id number]    YOB: 1940  Age: 79 y.o. Sex: male      Admit Date: 9/22/2020    LOS: 9 days     Subjective:     80F, H/o AECHFr and pEF with AICD, PAD, CAD s/p stent with septic shock s.t UTI/ e faecalis on levaquin, sepsis/aechf resolved. Bullous pressure lesion L lower left s/p I&D 9/24. Seen at bedside. Alert and oriented,   No ocp  + sob stable, better from admit. O2 stable @ 2l  Main complaint is weakness and loss of appetite. BP remains quite low to sbp 80's intermittently  BG's not well controlled           Review of Systems   Constitutional: Positive for malaise/fatigue/anorexia  HENT: Negative.    Eyes: Negative.    Respiratory: Positive for shortness of breath   Cardiovascular: neg for palpitaions or cp  Gastrointestinal: negative for heartburn, nausea or vomiting  Genitourinary: Negative.    Musculoskeletal: Negative.    Skin: Negative.    Neurological: Negative.    Endo/Heme/Allergies: Negative.    Psychiatric/Behavioral: Negative.      Prior to Admission Medications   Prescriptions Last Dose Informant Patient Reported? Taking? JANUVIA 100 mg tablet 9/22/2020  Yes Yes   Sig: TAKE ONE TABLET BY MOUTH ONE TIME DAILY   atorvastatin (LIPITOR) 40 mg tablet 9/22/2020  Yes Yes   Sig: TAKE ONE TABLET BY MOUTH ONE TIME DAILY AT BEDTIME   cholecalciferol (VITAMIN D3) 1,000 unit tablet 9/22/2020  Yes Yes   Sig: Take  by mouth daily.    clopidogrel (PLAVIX) 75 mg tab 9/22/2020  Yes Yes   Sig: TAKE ONE TABLET BY MOUTH ONE TIME DAILY   cyanocobalamin (VITAMIN B12) 1,000 mcg/mL injection 9/22/2020  Yes Yes   Sig: INJECT 1 ML SUBCUTANEOUSLY ONCE MONTHLY   ezetimibe (ZETIA) 10 mg tablet 9/22/2020  Yes Yes   Sig: TAKE ONE TABLET BY MOUTH AT BEDTIME   ferrous sulfate 325 mg (65 mg iron) tablet 9/22/2020  Yes Yes   Sig: TAKE ONE TABLET BY MOUTH TWICE A DAY   furosemide (LASIX) 40 mg tablet 9/22/2020  Yes Yes   Sig: TAKE ONE TABLET BY MOUTH ONE TIME DAILY   insulin detemir (LEVEMIR FLEXTOUCH U-100 INSULN SC) 2020  Yes Yes   Sig: by SubCUTAneous route. Indications: as needed for BS > 150 once a day then take 10 units   levothyroxine (SYNTHROID) 88 mcg tablet 2020  Yes Yes   Sig: TAKE ONE TABLET BY MOUTH EVERY MORNING   lisinopril (PRINIVIL, ZESTRIL) 5 mg tablet 2020  Yes Yes   Si.5 mg daily   metoprolol succinate (TOPROL-XL) 25 mg XL tablet 2020  Yes Yes   Sig: TAKE ONE TABLET BY MOUTH ONE TIME DAILY   nitroglycerin (NITROSTAT) 0.4 mg SL tablet 2020  Yes Yes   Si.4 mg by SubLINGual route every five (5) minutes as needed for Chest Pain. Up to 3 doses.    pantoprazole (PROTONIX) 40 mg tablet 2020  Yes Yes   Sig: TAKE ONE TABLET BY MOUTH EVERY MORNING   ranolazine ER (RANEXA) 1,000 mg 2020  Yes Yes   Sig: TAKE ONE TABLET BY MOUTH TWICE A DAY      Facility-Administered Medications: None       Current Facility-Administered Medications:     metoprolol succinate (TOPROL-XL) XL tablet 12.5 mg, 12.5 mg, Oral, DAILY, Maurilio Marquez MD, Stopped at 10/02/20 0900    lisinopriL (PRINIVIL, ZESTRIL) tablet 2.5 mg, 2.5 mg, Oral, DAILY, Maurilio Marquez MD, Stopped at 10/02/20 0900    [START ON 10/3/2020] SITagliptin (JANUVIA) tablet 100 mg, 100 mg, Oral, DAILY, Maurilio Marquez MD    dronabinoL (MARINOL) capsule 2.5 mg, 2.5 mg, Oral, ACB&D, Maurilio Marquez MD    DOPamine (INTROPIN) 400 mg in dextrose 5% 250 mL infusion, 5 mcg/kg/min, IntraVENous, CONTINUOUS, Mauirlio Marquez MD, Last Rate: 18 mL/hr at 10/02/20 0303, 5 mcg/kg/min at 10/02/20 0303    midodrine (PROAMATINE) tablet 10 mg, 10 mg, Oral, TID WITH MEALS, Maurilio Marquez MD, 10 mg at 10/02/20 1641    levoFLOXacin (LEVAQUIN) 750 mg in D5W IVPB, 750 mg, IntraVENous, Q48H, Mandy Ortiz MD, Last Rate: 100 mL/hr at 10/01/20 1322, 750 mg at 10/01/20 1322    heparin porcine injection 5,000 Units, 5,000 Units, SubCUTAneous, Q8H, release tablet, Take 30 mg by mouth daily, Disp: , Rfl:     pravastatin (PRAVACHOL) 80 MG tablet, Take 80 mg by mouth daily, Disp: , Rfl:     Allergies   Allergen Reactions    Sulfa Antibiotics        Past Medical History:   Diagnosis Date    [facility]rial fibrillation ([facility]     CAD (coronary artery disease)     Cancer ([facility]     prostate    Hypertension      Social History     Socioeconomic History    Marital status:      Spouse name: Not on file    Number of children: Not on file    Years of education: Not on file    Highest education level: Not on file   Occupational History    Not on file   Social Needs    Financial resource strain: Not on file    Food insecurity:     Worry: Not on file     Inability: Not on file    Transportation needs:     Medical: Not on file     Non-medical: Not on file   Tobacco Use    Smoking status: Former Smoker     Packs/day: 2.00     Years: 45.00     Pack years: 90.00     Types: Cigarettes     Last attempt to quit: 2015     Years since quittin.0    Smokeless tobacco: Never Used   Substance and Sexual Activity    Alcohol use: Not on file    Drug use: Not on file    Sexual activity: Not on file   Lifestyle    Physical activity:     Days per week: Not on file     Minutes per session: Not on file    Stress: Not on file   Relationships    Social connections:     Talks on phone: Not on file     Gets together: Not on file     Attends Synagogue service: Not on file     Active member of club or organization: Not on file     Attends meetings of clubs or organizations: Not on file     Relationship status: Not on file    Intimate partner violence:     Fear of current or ex partner: Not on file     Emotionally abused: Not on file     Physically abused: Not on file     Forced sexual activity: Not on file   Other Topics Concern    Not on file   Social History Narrative    Not on file     No past surgical history on file. No family history on file.      Vitals: Kusum Rosen MD, 5,000 Units at 10/02/20 1428    insulin glargine (LANTUS) injection 10 Units, 10 Units, SubCUTAneous, QHS, Bhaty, Vena Denver, MD, Stopped at 10/01/20 2200    glucose chewable tablet 16 g, 4 Tab, Oral, PRN, Kusum Rosen MD    glucagon (GLUCAGEN) injection 1 mg, 1 mg, IntraMUSCular, PRN, Kusum Rosen MD    dextrose (D50W) injection syrg 12.5-25 g, 25-50 mL, IntraVENous, PRN, Kusum Rosen MD    atorvastatin (LIPITOR) tablet 40 mg, 40 mg, Oral, QHS, Hernán Ford MD, Stopped at 10/01/20 2200    cholecalciferol (VITAMIN D3) (1000 Units /25 mcg) tablet 1 Tab, 1,000 Units, Oral, DAILY, Hernán Ford MD, 1 Tab at 10/02/20 0855    clopidogreL (PLAVIX) tablet 75 mg, 75 mg, Oral, DAILY, Hernán Ford MD, 75 mg at 10/02/20 0854    ezetimibe (ZETIA) tablet 10 mg, 10 mg, Oral, DAILY, Hernán Ford MD, 10 mg at 10/02/20 0854    ferrous sulfate tablet 325 mg, 1 Tab, Oral, DAILY WITH BREAKFAST, Hernán Ford MD, 325 mg at 10/02/20 0855    furosemide (LASIX) tablet 40 mg, 40 mg, Oral, DAILY, Hernán Ford MD, Stopped at 10/01/20 0900    [Held by provider] SITagliptin (JANUVIA) tablet tab 50 mg, 50 mg, Oral, DAILY, Hernán Ford MD, Stopped at 09/25/20 0900    levothyroxine (SYNTHROID) tablet 88 mcg, 88 mcg, Oral, 6am, Hernán Ford MD, 88 mcg at 10/02/20 0743    pantoprazole (PROTONIX) tablet 40 mg, 40 mg, Oral, ACB, Hernán Ford MD, 40 mg at 10/02/20 0903    [Held by provider] ranolazine ER (RANEXA) tablet 500 mg, 500 mg, Oral, BID, Hernán Ford MD, Stopped at 09/25/20 0900    insulin lispro (HUMALOG) injection, , SubCUTAneous, AC&HS, Hernán Ford MD, 3 Units at 10/02/20 1641    glucose chewable tablet 16 g, 4 Tab, Oral, PRN, Hernán Ford MD    dextrose (D50W) injection syrg 12.5-25 g, 25-50 mL, IntraVENous, PRN, Hernán Ford MD    glucagon ([address] [place] [address]) injection 1 mg, 1 mg, IntraMUSCular, PRN, Jenna Briseno MD    multivitamin (ONE A DAY) tablet 1 Tab, 1 Tab, Oral, DAILY, Jenna Briseno MD, 1 Tab at 10/02/20 0855    B complex-vitaminC-folic acid (NEPHROCAP) cap, 1 Cap, Oral, DAILY, Jenna Briseno MD, 1 Cap at 10/02/20 0854    acetaminophen (TYLENOL) tablet 650 mg, 650 mg, Oral, Q4H PRN, Boris Dang NP, 650 mg at 10/02/20 0903    oxyCODONE IR (ROXICODONE) tablet 5 mg, 5 mg, Oral, Q6H PRN, Salma Miller PA-C, 5 mg at 10/02/20 0336    Objective:     Vitals:    10/02/20 1115 10/02/20 1529 10/02/20 1559 10/02/20 1738   BP: (!) 99/51  (!) 99/56    Pulse: 75  65    Resp: 16  20    Temp: 98.8 °F (37.1 °C)  97.6 °F (36.4 °C)    SpO2: 100%  99% 99%   Weight:       Height:  5' 6\" (1.676 m)          Intake and Output:  Current Shift: No intake/output data recorded. Last three shifts: No intake/output data recorded. Physical Exam:   General : Appearance:  no respiratory distress noted  HEENT : EOMI, normal oral mucosa, atraumatic normocephalic, Normal ear and nose. Neck : Supple, no JVD, no masses noted, no carotid bruit  Lungs : normal breath sounds.  No wheezing, no rales.  He is splinting on deep breathing. CVS : Rhythm rate regular, S1+, S2+, no murmur or gallop  Abdomen : Soft, nontender, no organomegaly, bowel sounds active  Extremities : No edema noted,  pedal pulses palpable  Musculoskeletal : Fair range of motion, no joint swelling or effusion, muscle tone and power appears normal,   Skin : Moist, warm, skin turgor, no pathological rash  Lymphatic : No cervical lymphadenopathy.   Neurological : Awake, alert, oriented to time place person.  No neurological deficits.    Psychiatric : Mood and affect appears appropriate to the situation.     Lab/Data Review:  Recent Results (from the past 24 hour(s))   GLUCOSE, POC    Collection Time: 10/01/20  7:49 PM   Result Value Ref Range    Glucose (POC) 209 (H) 65 - 100 mg/dL Performed by Jovita Jacques    GLUCOSE, POC    Collection Time: 10/02/20  7:00 AM   Result Value Ref Range    Glucose (POC) 214 (H) 65 - 100 mg/dL    Performed by Deborah Preciado    GLUCOSE, POC    Collection Time: 10/02/20 11:18 AM   Result Value Ref Range    Glucose (POC) 227 (H) 65 - 100 mg/dL    Performed by Hannah Caraballo    METABOLIC PANEL, BASIC    Collection Time: 10/02/20 12:20 PM   Result Value Ref Range    Sodium 130 (L) 136 - 145 mmol/L    Potassium 3.6 3.5 - 5.1 mmol/L    Chloride 97 97 - 108 mmol/L    CO2 27 21 - 32 mmol/L    Anion gap 6 5 - 15 mmol/L    Glucose 192 (H) 65 - 100 mg/dL    BUN 26 (H) 6 - 20 mg/dL    Creatinine 1.10 0.70 - 1.30 mg/dL    BUN/Creatinine ratio 24 (H) 12 - 20      GFR est AA >60 >60 ml/min/1.73m2    GFR est non-AA >60 >60 ml/min/1.73m2    Calcium 9.0 8.5 - 10.1 mg/dL   MAGNESIUM    Collection Time: 10/02/20 12:20 PM   Result Value Ref Range    Magnesium 1.7 1.6 - 2.4 mg/dL   GLUCOSE, POC    Collection Time: 10/02/20  4:04 PM   Result Value Ref Range    Glucose (POC) 249 (H) 65 - 100 mg/dL    Performed by Deborah Preciado          Assessment and plan:      --shock: likely cardiogenic and septic. Levophed and dobutamine. Resolved. Off pressor/dobutamine, s/t  E. Faecalis UTI. levaquin 750 daily from 9/27     --UTI: ucs=E Faecalis, continue levaquin     --AECHFr and pEF, resolved, cont lasix po, cardio followed     --PAD: atorvastatin, Plavix, , ezetimibe     --CYNDI on CKDIII: cardiorenal potential ATN given the hypotension. Resolved cyndi    --DMII :lantus 10 and SSI. Not well controlled, resume Bryant Countess     --CAD s/p stent/CABG : plavix.       -- Hypotension: Metoprolol dec 12.5 mg bid, lisinopril dec 2.5 mg daily, holding ranexa. Dopamine continues. Monitor. Ranexa initiated last admit? May contribute to anorexia. --Anorexia: states started about 1 week prior to return. Appetite remains poor. Will start marinol- discussed and he is agreable.     --AstheniaGait impairment: unable to ambulate safely, participate in adl/iadl and cannot safely maneuver walker or cane. He appears cognizant and safe to use wheelchair but weakness would limit use of standard wheelchair and I recommend a lightweight chair. Wc would improve pts participation and would assist caregivers in [address]. Declines rehab, will need lightweight wheelchair for mobility/safety. Pt states ambulatory prior to admit 8/2020, [facility] x 32 days then back inpatient. States progressed to wheelchair at facility but weaker now and not sure can return home like this now. He does not want to return to [facility] but is open to other snf options if available. Will d/w cm. His weakness appears certainly to have progressed again with prolonged hospitalization.        DVT ppx: heparin    DISPO: Pending course, snf rec'd- cn input noted,[facility] dme arranging. He is profoundly weak and now is considering snf though does not want to return to [facility].     Signed By: Tao Altman MD     October 2, 2020

## 2020-10-03 ENCOUNTER — APPOINTMENT (OUTPATIENT)
Dept: NON INVASIVE DIAGNOSTICS | Age: 80
DRG: 871 | End: 2020-10-03
Attending: INTERNAL MEDICINE
Payer: MEDICARE

## 2020-10-03 LAB
ANION GAP SERPL CALC-SCNC: 7 MMOL/L (ref 5–15)
BASOPHILS # BLD: 0.1 K/UL (ref 0–0.1)
BASOPHILS NFR BLD: 1 % (ref 0–1)
BUN SERPL-MCNC: 23 MG/DL (ref 6–20)
BUN/CREAT SERPL: 25 (ref 12–20)
CA-I BLD-MCNC: 9.1 MG/DL (ref 8.5–10.1)
CHLORIDE SERPL-SCNC: 98 MMOL/L (ref 97–108)
CO2 SERPL-SCNC: 28 MMOL/L (ref 21–32)
CREAT SERPL-MCNC: 0.91 MG/DL (ref 0.7–1.3)
DIFFERENTIAL METHOD BLD: ABNORMAL
EOSINOPHIL # BLD: 0.2 K/UL (ref 0–0.4)
EOSINOPHIL NFR BLD: 2 % (ref 0–7)
ERYTHROCYTE [DISTWIDTH] IN BLOOD BY AUTOMATED COUNT: 16.1 % (ref 11.5–14.5)
GLUCOSE BLD STRIP.AUTO-MCNC: 160 MG/DL (ref 65–100)
GLUCOSE BLD STRIP.AUTO-MCNC: 209 MG/DL (ref 65–100)
GLUCOSE BLD STRIP.AUTO-MCNC: 255 MG/DL (ref 65–100)
GLUCOSE BLD STRIP.AUTO-MCNC: 263 MG/DL (ref 65–100)
GLUCOSE SERPL-MCNC: 187 MG/DL (ref 65–100)
HCT VFR BLD AUTO: 29 % (ref 36.6–50.3)
HGB BLD-MCNC: 9.6 G/DL (ref 12.1–17)
IMM GRANULOCYTES # BLD AUTO: 0 K/UL (ref 0–0.04)
IMM GRANULOCYTES NFR BLD AUTO: 0 % (ref 0–0.5)
LYMPHOCYTES # BLD: 0.5 K/UL (ref 0.8–3.5)
LYMPHOCYTES NFR BLD: 7 % (ref 12–49)
MCH RBC QN AUTO: 27 PG (ref 26–34)
MCHC RBC AUTO-ENTMCNC: 33.1 G/DL (ref 30–36.5)
MCV RBC AUTO: 81.5 FL (ref 80–99)
MONOCYTES # BLD: 0.6 K/UL (ref 0–1)
MONOCYTES NFR BLD: 8 % (ref 5–13)
NEUTS SEG # BLD: 6.1 K/UL (ref 1.8–8)
NEUTS SEG NFR BLD: 82 % (ref 32–75)
PERFORMED BY, TECHID: ABNORMAL
PLATELET # BLD AUTO: 487 K/UL (ref 150–400)
PMV BLD AUTO: 10.5 FL (ref 8.9–12.9)
POTASSIUM SERPL-SCNC: 3.6 MMOL/L (ref 3.5–5.1)
RBC # BLD AUTO: 3.56 M/UL (ref 4.1–5.7)
RBC MORPH BLD: ABNORMAL
RBC MORPH BLD: ABNORMAL
SODIUM SERPL-SCNC: 133 MMOL/L (ref 136–145)
WBC # BLD AUTO: 7.5 K/UL (ref 4.1–11.1)

## 2020-10-03 PROCEDURE — 36415 COLL VENOUS BLD VENIPUNCTURE: CPT

## 2020-10-03 PROCEDURE — 93971 EXTREMITY STUDY: CPT

## 2020-10-03 PROCEDURE — 85025 COMPLETE CBC W/AUTO DIFF WBC: CPT

## 2020-10-03 PROCEDURE — 94760 N-INVAS EAR/PLS OXIMETRY 1: CPT

## 2020-10-03 PROCEDURE — 80048 BASIC METABOLIC PNL TOTAL CA: CPT

## 2020-10-03 PROCEDURE — 74011250637 HC RX REV CODE- 250/637: Performed by: HOSPITALIST

## 2020-10-03 PROCEDURE — 77010033678 HC OXYGEN DAILY

## 2020-10-03 PROCEDURE — 74011250637 HC RX REV CODE- 250/637: Performed by: NURSE PRACTITIONER

## 2020-10-03 PROCEDURE — 74011250637 HC RX REV CODE- 250/637: Performed by: INTERNAL MEDICINE

## 2020-10-03 PROCEDURE — 74011636637 HC RX REV CODE- 636/637: Performed by: HOSPITALIST

## 2020-10-03 PROCEDURE — 82962 GLUCOSE BLOOD TEST: CPT

## 2020-10-03 PROCEDURE — 65270000029 HC RM PRIVATE

## 2020-10-03 PROCEDURE — 74011250636 HC RX REV CODE- 250/636: Performed by: HOSPITALIST

## 2020-10-03 PROCEDURE — 74011250636 HC RX REV CODE- 250/636: Performed by: INTERNAL MEDICINE

## 2020-10-03 RX ORDER — INSULIN GLARGINE 100 [IU]/ML
15 INJECTION, SOLUTION SUBCUTANEOUS
Status: DISCONTINUED | OUTPATIENT
Start: 2020-10-03 | End: 2020-10-05

## 2020-10-03 RX ORDER — DRONABINOL 2.5 MG/1
5 CAPSULE ORAL
Status: DISCONTINUED | OUTPATIENT
Start: 2020-10-04 | End: 2020-10-16 | Stop reason: HOSPADM

## 2020-10-03 RX ORDER — DOPAMINE HYDROCHLORIDE 160 MG/100ML
5 INJECTION, SOLUTION INTRAVENOUS
Status: DISCONTINUED | OUTPATIENT
Start: 2020-10-03 | End: 2020-10-05

## 2020-10-03 RX ADMIN — ATORVASTATIN CALCIUM 40 MG: 40 TABLET, FILM COATED ORAL at 20:19

## 2020-10-03 RX ADMIN — MIDODRINE HYDROCHLORIDE 10 MG: 5 TABLET ORAL at 17:56

## 2020-10-03 RX ADMIN — DOPAMINE HYDROCHLORIDE 5 MCG/KG/MIN: 160 INJECTION, SOLUTION INTRAVENOUS at 13:11

## 2020-10-03 RX ADMIN — INSULIN LISPRO 5 UNITS: 100 INJECTION, SOLUTION INTRAVENOUS; SUBCUTANEOUS at 13:01

## 2020-10-03 RX ADMIN — METOPROLOL SUCCINATE 12.5 MG: 25 TABLET, EXTENDED RELEASE ORAL at 10:29

## 2020-10-03 RX ADMIN — MIDODRINE HYDROCHLORIDE 10 MG: 5 TABLET ORAL at 13:01

## 2020-10-03 RX ADMIN — INSULIN LISPRO 5 UNITS: 100 INJECTION, SOLUTION INTRAVENOUS; SUBCUTANEOUS at 17:56

## 2020-10-03 RX ADMIN — HEPARIN SODIUM 5000 UNITS: 10000 INJECTION, SOLUTION INTRAVENOUS; SUBCUTANEOUS at 06:49

## 2020-10-03 RX ADMIN — PANTOPRAZOLE SODIUM 40 MG: 40 TABLET, DELAYED RELEASE ORAL at 10:30

## 2020-10-03 RX ADMIN — ACETAMINOPHEN 650 MG: 325 TABLET, FILM COATED ORAL at 06:49

## 2020-10-03 RX ADMIN — HEPARIN SODIUM 5000 UNITS: 10000 INJECTION, SOLUTION INTRAVENOUS; SUBCUTANEOUS at 13:02

## 2020-10-03 RX ADMIN — CLOPIDOGREL BISULFATE 75 MG: 75 TABLET ORAL at 10:29

## 2020-10-03 RX ADMIN — HEPARIN SODIUM 5000 UNITS: 10000 INJECTION, SOLUTION INTRAVENOUS; SUBCUTANEOUS at 20:19

## 2020-10-03 RX ADMIN — ACETAMINOPHEN 650 MG: 325 TABLET, FILM COATED ORAL at 20:19

## 2020-10-03 RX ADMIN — MIDODRINE HYDROCHLORIDE 10 MG: 5 TABLET ORAL at 10:29

## 2020-10-03 RX ADMIN — RENO CAPS 1 CAPSULE: 100; 1.5; 1.7; 20; 10; 1; 150; 5; 6 CAPSULE ORAL at 10:30

## 2020-10-03 RX ADMIN — FUROSEMIDE 40 MG: 40 TABLET ORAL at 10:29

## 2020-10-03 RX ADMIN — LEVOTHYROXINE SODIUM 88 MCG: 88 TABLET ORAL at 07:01

## 2020-10-03 RX ADMIN — DOPAMINE HYDROCHLORIDE IN DEXTROSE 5 MCG/KG/MIN: 1.6 INJECTION, SOLUTION INTRAVENOUS at 22:06

## 2020-10-03 RX ADMIN — FERROUS SULFATE TAB 325 MG (65 MG ELEMENTAL FE) 325 MG: 325 (65 FE) TAB at 10:29

## 2020-10-03 RX ADMIN — Medication 1 TABLET: at 10:30

## 2020-10-03 RX ADMIN — EZETIMIBE 10 MG: 10 TABLET ORAL at 10:30

## 2020-10-03 RX ADMIN — LEVOFLOXACIN 750 MG: 5 INJECTION, SOLUTION INTRAVENOUS at 17:56

## 2020-10-03 RX ADMIN — LISINOPRIL 2.5 MG: 5 TABLET ORAL at 10:29

## 2020-10-03 RX ADMIN — ACETAMINOPHEN 650 MG: 325 TABLET, FILM COATED ORAL at 10:52

## 2020-10-03 RX ADMIN — DRONABINOL 2.5 MG: 2.5 CAPSULE ORAL at 10:30

## 2020-10-03 RX ADMIN — MULTIVITAMIN TABLET 1 TABLET: TABLET at 10:30

## 2020-10-03 RX ADMIN — SITAGLIPTIN 50 MG: 100 TABLET, FILM COATED ORAL at 13:17

## 2020-10-03 NOTE — PROGRESS NOTES
BP 60/37, paged Dr. Jeffrey Haines. Orders received from Dr. Jeffrey Haines to restart dopamine IV at 5 mcg/mg/min. Read back completed at this time and confirmed. Will continue to monitor.

## 2020-10-03 NOTE — PROGRESS NOTES
Problem: Pressure Injury - Risk of  Goal: *Prevention of pressure injury  Description: Document John Scale and appropriate interventions in the flowsheet. Outcome: Progressing Towards Goal  Note: Pressure Injury Interventions:  Sensory Interventions: Assess changes in LOC, Float heels, Keep linens dry and wrinkle-free, Minimize linen layers, Pad between skin to skin    Moisture Interventions: Absorbent underpads, Internal/External urinary devices, Minimize layers, Moisture barrier    Activity Interventions: Increase time out of bed, PT/OT evaluation    Mobility Interventions: Assess need for specialty bed, Float heels, PT/OT evaluation    Nutrition Interventions: Offer support with meals,snacks and hydration    Friction and Shear Interventions: Apply protective barrier, creams and emollients, Feet elevated on foot rest, Lift team/patient mobility team                Problem: Falls - Risk of  Goal: *Absence of Falls  Description: Document Kristi Fall Risk and appropriate interventions in the flowsheet.   Outcome: Progressing Towards Goal  Note: Fall Risk Interventions:  Mobility Interventions: Bed/chair exit alarm, Patient to call before getting OOB, PT Consult for mobility concerns         Medication Interventions: Bed/chair exit alarm, Patient to call before getting OOB    Elimination Interventions: Bed/chair exit alarm, Call light in reach, Patient to call for help with toileting needs

## 2020-10-03 NOTE — PROGRESS NOTES
Bedside and Verbal shift change report given to Dee Cornejo RN (oncoming nurse) by Octavia Goodman RN (offgoing nurse). Report included the following information SBAR, Intake/Output, MAR, Recent Results and Cardiac Rhythm AV paced.

## 2020-10-04 ENCOUNTER — APPOINTMENT (OUTPATIENT)
Dept: GENERAL RADIOLOGY | Age: 80
DRG: 871 | End: 2020-10-04
Attending: INTERNAL MEDICINE
Payer: MEDICARE

## 2020-10-04 LAB
GLUCOSE BLD STRIP.AUTO-MCNC: 156 MG/DL (ref 65–100)
GLUCOSE BLD STRIP.AUTO-MCNC: 182 MG/DL (ref 65–100)
GLUCOSE BLD STRIP.AUTO-MCNC: 218 MG/DL (ref 65–100)
GLUCOSE BLD STRIP.AUTO-MCNC: 228 MG/DL (ref 65–100)
PERFORMED BY, TECHID: ABNORMAL

## 2020-10-04 PROCEDURE — 74011250637 HC RX REV CODE- 250/637: Performed by: HOSPITALIST

## 2020-10-04 PROCEDURE — 94760 N-INVAS EAR/PLS OXIMETRY 1: CPT

## 2020-10-04 PROCEDURE — 74011250637 HC RX REV CODE- 250/637: Performed by: INTERNAL MEDICINE

## 2020-10-04 PROCEDURE — 77010033678 HC OXYGEN DAILY

## 2020-10-04 PROCEDURE — 71045 X-RAY EXAM CHEST 1 VIEW: CPT

## 2020-10-04 PROCEDURE — 74011250636 HC RX REV CODE- 250/636: Performed by: HOSPITALIST

## 2020-10-04 PROCEDURE — 74011636637 HC RX REV CODE- 636/637: Performed by: HOSPITALIST

## 2020-10-04 PROCEDURE — 74011250636 HC RX REV CODE- 250/636: Performed by: INTERNAL MEDICINE

## 2020-10-04 PROCEDURE — 65270000029 HC RM PRIVATE

## 2020-10-04 PROCEDURE — 74011636637 HC RX REV CODE- 636/637: Performed by: INTERNAL MEDICINE

## 2020-10-04 PROCEDURE — 74011250637 HC RX REV CODE- 250/637: Performed by: NURSE PRACTITIONER

## 2020-10-04 PROCEDURE — 82962 GLUCOSE BLOOD TEST: CPT

## 2020-10-04 RX ORDER — POTASSIUM CHLORIDE 20 MEQ/1
20 TABLET, EXTENDED RELEASE ORAL DAILY
Status: DISCONTINUED | OUTPATIENT
Start: 2020-10-05 | End: 2020-10-16 | Stop reason: HOSPADM

## 2020-10-04 RX ADMIN — HEPARIN SODIUM 5000 UNITS: 10000 INJECTION, SOLUTION INTRAVENOUS; SUBCUTANEOUS at 05:57

## 2020-10-04 RX ADMIN — CLOPIDOGREL BISULFATE 75 MG: 75 TABLET ORAL at 10:18

## 2020-10-04 RX ADMIN — RENO CAPS 1 CAPSULE: 100; 1.5; 1.7; 20; 10; 1; 150; 5; 6 CAPSULE ORAL at 10:19

## 2020-10-04 RX ADMIN — HEPARIN SODIUM 5000 UNITS: 10000 INJECTION, SOLUTION INTRAVENOUS; SUBCUTANEOUS at 13:03

## 2020-10-04 RX ADMIN — INSULIN LISPRO 2 UNITS: 100 INJECTION, SOLUTION INTRAVENOUS; SUBCUTANEOUS at 13:03

## 2020-10-04 RX ADMIN — DOPAMINE HYDROCHLORIDE IN DEXTROSE 5 MCG/KG/MIN: 1.6 INJECTION, SOLUTION INTRAVENOUS at 18:30

## 2020-10-04 RX ADMIN — HEPARIN SODIUM 5000 UNITS: 10000 INJECTION, SOLUTION INTRAVENOUS; SUBCUTANEOUS at 22:33

## 2020-10-04 RX ADMIN — MULTIVITAMIN TABLET 1 TABLET: TABLET at 10:19

## 2020-10-04 RX ADMIN — INSULIN LISPRO 3 UNITS: 100 INJECTION, SOLUTION INTRAVENOUS; SUBCUTANEOUS at 17:24

## 2020-10-04 RX ADMIN — MIDODRINE HYDROCHLORIDE 10 MG: 5 TABLET ORAL at 17:23

## 2020-10-04 RX ADMIN — MIDODRINE HYDROCHLORIDE 10 MG: 5 TABLET ORAL at 10:18

## 2020-10-04 RX ADMIN — PANTOPRAZOLE SODIUM 40 MG: 40 TABLET, DELAYED RELEASE ORAL at 10:20

## 2020-10-04 RX ADMIN — DRONABINOL 5 MG: 2.5 CAPSULE ORAL at 10:19

## 2020-10-04 RX ADMIN — INSULIN GLARGINE 15 UNITS: 100 INJECTION, SOLUTION SUBCUTANEOUS at 22:00

## 2020-10-04 RX ADMIN — ACETAMINOPHEN 650 MG: 325 TABLET, FILM COATED ORAL at 05:57

## 2020-10-04 RX ADMIN — EZETIMIBE 10 MG: 10 TABLET ORAL at 10:19

## 2020-10-04 RX ADMIN — DRONABINOL 5 MG: 2.5 CAPSULE ORAL at 17:23

## 2020-10-04 RX ADMIN — ACETAMINOPHEN 650 MG: 325 TABLET, FILM COATED ORAL at 22:33

## 2020-10-04 RX ADMIN — INSULIN LISPRO 2 UNITS: 100 INJECTION, SOLUTION INTRAVENOUS; SUBCUTANEOUS at 22:00

## 2020-10-04 RX ADMIN — FERROUS SULFATE TAB 325 MG (65 MG ELEMENTAL FE) 325 MG: 325 (65 FE) TAB at 10:19

## 2020-10-04 RX ADMIN — LEVOTHYROXINE SODIUM 88 MCG: 88 TABLET ORAL at 05:57

## 2020-10-04 RX ADMIN — Medication 1 TABLET: at 10:19

## 2020-10-04 RX ADMIN — SITAGLIPTIN 50 MG: 100 TABLET, FILM COATED ORAL at 10:24

## 2020-10-04 RX ADMIN — MIDODRINE HYDROCHLORIDE 10 MG: 5 TABLET ORAL at 13:04

## 2020-10-04 RX ADMIN — ATORVASTATIN CALCIUM 40 MG: 40 TABLET, FILM COATED ORAL at 22:33

## 2020-10-04 NOTE — PROGRESS NOTES
Problem: Pressure Injury - Risk of  Goal: *Prevention of pressure injury  Description: Document John Scale and appropriate interventions in the flowsheet. Outcome: Progressing Towards Goal  Note: Pressure Injury Interventions:  Sensory Interventions: Assess changes in LOC, Assess need for specialty bed, Discuss PT/OT consult with provider, Float heels, Keep linens dry and wrinkle-free, Minimize linen layers, Pad between skin to skin, Turn and reposition approx. every two hours (pillows and wedges if needed)    Moisture Interventions: Absorbent underpads, Assess need for specialty bed    Activity Interventions: PT/OT evaluation    Mobility Interventions: Float heels, HOB 30 degrees or less    Nutrition Interventions: Document food/fluid/supplement intake    Friction and Shear Interventions: Minimize layers                Problem: Falls - Risk of  Goal: *Absence of Falls  Description: Document Kristi Fall Risk and appropriate interventions in the flowsheet.   Outcome: Progressing Towards Goal  Note: Fall Risk Interventions:  Mobility Interventions: Bed/chair exit alarm, Patient to call before getting OOB, Utilize walker, cane, or other assistive device         Medication Interventions: Assess postural VS orthostatic hypotension, Patient to call before getting OOB    Elimination Interventions: Bed/chair exit alarm, Call light in reach, Patient to call for help with toileting needs              Problem: Hypotension  Goal: *Blood pressure within specified parameters  Outcome: Progressing Towards Goal

## 2020-10-04 NOTE — PROGRESS NOTES
Progress Note    Patient: Eder Lyman MRN: [id number]  SSN: [id number]    YOB: 1940  Age: 79 y.o. Sex: male      Admit Date: 9/22/2020    LOS: 11 days     Subjective:     80F, H/o AECHFr and pEF with AICD, PAD, CAD s/p stent with septic shock s.t UTI/ e faecalis on levaquin, sepsis/aechf resolved. Bullous pressure lesion L lower left s/p I&D 9/24. Seen at bedside. Alert and oriented,   No cp  + sob stable, better from admit. Off dopamine yesterday, sbp to 60's, bp rebounded. Main complaint is weakness and loss of appetite. BG's improved trend  cxr 10/4=Persistent interstitial edema with small bilateral pleural  effusions.       Review of Systems   Constitutional: Positive for malaise/fatigue/anorexia  HENT: Negative.    Eyes: Negative.    Respiratory: Positive for shortness of breath   Cardiovascular: neg for palpitaions or cp  Gastrointestinal: negative for heartburn, nausea or vomiting  Genitourinary: Negative.    Musculoskeletal: Negative.    Skin: Negative.    Neurological: Negative.    Endo/Heme/Allergies: Negative.    Psychiatric/Behavioral: Negative.      Prior to Admission Medications   Prescriptions Last Dose Informant Patient Reported? Taking? JANUVIA 100 mg tablet 9/22/2020  Yes Yes   Sig: TAKE ONE TABLET BY MOUTH ONE TIME DAILY   atorvastatin (LIPITOR) 40 mg tablet 9/22/2020  Yes Yes   Sig: TAKE ONE TABLET BY MOUTH ONE TIME DAILY AT BEDTIME   cholecalciferol (VITAMIN D3) 1,000 unit tablet 9/22/2020  Yes Yes   Sig: Take  by mouth daily.    clopidogrel (PLAVIX) 75 mg tab 9/22/2020  Yes Yes   Sig: TAKE ONE TABLET BY MOUTH ONE TIME DAILY   cyanocobalamin (VITAMIN B12) 1,000 mcg/mL injection 9/22/2020  Yes Yes   Sig: INJECT 1 ML SUBCUTANEOUSLY ONCE MONTHLY   ezetimibe (ZETIA) 10 mg tablet 9/22/2020  Yes Yes   Sig: TAKE ONE TABLET BY MOUTH AT BEDTIME   ferrous sulfate 325 mg (65 mg iron) tablet 9/22/2020  Yes Yes   Sig: TAKE ONE TABLET BY MOUTH TWICE A DAY furosemide (LASIX) 40 mg tablet 2020  Yes Yes   Sig: TAKE ONE TABLET BY MOUTH ONE TIME DAILY   insulin detemir (LEVEMIR FLEXTOUCH U-100 INSULN SC) 2020  Yes Yes   Sig: by SubCUTAneous route. Indications: as needed for BS > 150 once a day then take 10 units   levothyroxine (SYNTHROID) 88 mcg tablet 2020  Yes Yes   Sig: TAKE ONE TABLET BY MOUTH EVERY MORNING   lisinopril (PRINIVIL, ZESTRIL) 5 mg tablet 2020  Yes Yes   Si.5 mg daily   metoprolol succinate (TOPROL-XL) 25 mg XL tablet 2020  Yes Yes   Sig: TAKE ONE TABLET BY MOUTH ONE TIME DAILY   nitroglycerin (NITROSTAT) 0.4 mg SL tablet 2020  Yes Yes   Si.4 mg by SubLINGual route every five (5) minutes as needed for Chest Pain. Up to 3 doses.    pantoprazole (PROTONIX) 40 mg tablet 2020  Yes Yes   Sig: TAKE ONE TABLET BY MOUTH EVERY MORNING   ranolazine ER (RANEXA) 1,000 mg 2020  Yes Yes   Sig: TAKE ONE TABLET BY MOUTH TWICE A DAY      Facility-Administered Medications: None       Current Facility-Administered Medications:     dronabinoL (MARINOL) capsule 5 mg, 5 mg, Oral, ACB&D, Maurilio Rahman MD, 5 mg at 10/04/20 1019    insulin glargine (LANTUS) injection 15 Units, 15 Units, SubCUTAneous, QHS, Maurilio Portillo MD, Stopped at 10/03/20 2200    DOPamine (INTROPIN) 400 mg in dextrose 5% 250 mL infusion, 5 mcg/kg/min, IntraVENous, TITRATE, Chad BURT MD, Last Rate: 18 mL/hr at 10/03/20 2206, 5 mcg/kg/min at 10/03/20 220    metoprolol succinate (TOPROL-XL) XL tablet 12.5 mg, 12.5 mg, Oral, DAILY, Maurilio Rahman MD, Stopped at 10/04/20 0900    lisinopriL (PRINIVIL, ZESTRIL) tablet 2.5 mg, 2.5 mg, Oral, DAILY, Maurilio Rahman MD, Stopped at 10/04/20 0900    midodrine (PROAMATINE) tablet 10 mg, 10 mg, Oral, TID WITH MEALS, Maurilio Rahman MD, 10 mg at 10/04/20 1304    levoFLOXacin (LEVAQUIN) 750 mg in D5W IVPB, 750 mg, IntraVENous, Q48H, Corry Sr MD, Last Rate: 100 mL/hr at 10/03/20 1756, 750 mg at 10/03/20 1756    heparin porcine injection 5,000 Units, 5,000 Units, SubCUTAneous, Q8H, Saima Ortiz MD, 5,000 Units at 10/04/20 1303    glucose chewable tablet 16 g, 4 Tab, Oral, PRN, Harper Liu MD    glucagon (GLUCAGEN) injection 1 mg, 1 mg, IntraMUSCular, PRN, Harper Liu MD    dextrose (D50W) injection syrg 12.5-25 g, 25-50 mL, IntraVENous, PRN, Harper Lui MD    atorvastatin (LIPITOR) tablet 40 mg, 40 mg, Oral, QHS, Ruben Hassan MD, 40 mg at 10/03/20 2019    cholecalciferol (VITAMIN D3) (1000 Units /25 mcg) tablet 1 Tab, 1,000 Units, Oral, DAILY, Ruben Hassan MD, 1 Tab at 10/04/20 1019    clopidogreL (PLAVIX) tablet 75 mg, 75 mg, Oral, DAILY, Ruben Hassan MD, 75 mg at 10/04/20 1018    ezetimibe (ZETIA) tablet 10 mg, 10 mg, Oral, DAILY, Ruben Hassan MD, 10 mg at 10/04/20 1019    ferrous sulfate tablet 325 mg, 1 Tab, Oral, DAILY WITH BREAKFAST, Ruben Hassan MD, 325 mg at 10/04/20 1019    furosemide (LASIX) tablet 40 mg, 40 mg, Oral, DAILY, Ruben Hassan MD, Stopped at 10/04/20 0900    SITagliptin (JANUVIA) tablet tab 50 mg, 50 mg, Oral, DAILY, Ruben Hassan MD, 50 mg at 10/04/20 1024    levothyroxine (SYNTHROID) tablet 88 mcg, 88 mcg, Oral, 6am, Ruben Hassan MD, 88 mcg at 10/04/20 0557    pantoprazole (PROTONIX) tablet 40 mg, 40 mg, Oral, ACB, Ruben Hassan MD, 40 mg at 10/04/20 1020    [Held by provider] ranolazine ER (RANEXA) tablet 500 mg, 500 mg, Oral, BID, Ruben Hassan MD, Stopped at 09/25/20 0900    insulin lispro (HUMALOG) injection, , SubCUTAneous, AC&HS, Ruben Hassan MD, 2 Units at 10/04/20 1303    glucose chewable tablet 16 g, 4 Tab, Oral, PRN, Ruben Hassan MD    dextrose (D50W) injection syrg 12.5-25 g, 25-50 mL, IntraVENous, PRN, Ruben Hassan MD    glucagon (GLUCAGEN) injection 1 mg, 1 mg, IntraMUSCular, PRN, Shruthi Rey MD    multivitamin (ONE A DAY) tablet 1 Tab, 1 Tab, Oral, DAILY, Shruthi Rey MD, 1 Tab at 10/04/20 1019    B complex-vitaminC-folic acid (NEPHROCAP) cap, 1 Cap, Oral, DAILY, Shruthi Rey MD, 1 Cap at 10/04/20 1019    acetaminophen (TYLENOL) tablet 650 mg, 650 mg, Oral, Q4H PRN, [place], NP, 650 mg at 10/04/20 0557    oxyCODONE IR (ROXICODONE) tablet 5 mg, 5 mg, Oral, Q6H PRN, [place] Bronson PA-C, 5 mg at 10/02/20 0336    Objective:     Vitals:    10/04/20 0434 10/04/20 0829 10/04/20 1219 10/04/20 1426   BP: (!) 94/52 (!) 107/48 (!) 133/54    Pulse: 80 70 70    Resp: 18 20 20    Temp: 97.7 °F (36.5 °C) 97.8 °F (36.6 °C) 97.8 °F (36.6 °C)    SpO2:  98% 98% 98%   Weight:       Height:            Intake and Output:  Current Shift: No intake/output data recorded. Last three shifts: No intake/output data recorded. Physical Exam:   General : Appearance:  no respiratory distress noted  HEENT : EOMI, normal oral mucosa, atraumatic normocephalic, Normal ear and nose. Neck : Supple, no JVD, no masses noted, no carotid bruit  Lungs : normal breath sounds.  No wheezing, no rales.  nonlabored resp. CVS : Rhythm rate regular, S1+, S2+, no murmur or gallop  Abdomen : Soft, nontender, no organomegaly, bowel sounds active  Extremities : No edema noted,  pedal pulses palpable  Musculoskeletal : Fair range of motion, no joint swelling or effusion, weak generally  Skin : Moist, warm, skin turgor, no pathological rash  Lymphatic : No cervical lymphadenopathy.   Neurological : Awake, alert, oriented to time place person.  No neurological deficits.    Psychiatric : Mood and affect appears appropriate to the situation.     Lab/Data Review:  Recent Results (from the past 24 hour(s))   GLUCOSE, POC    Collection Time: 10/03/20  5:52 PM   Result Value Ref Range    Glucose (POC) 263 (H) 65 - 100 mg/dL    Performed by CHELI Conner    Collection Time: 10/03/20 10:05 PM   Result Value Ref Range    Glucose (POC) 160 (H) 65 - 100 mg/dL    Performed by Jasmina Mancia    GLUCOSE, POC    Collection Time: 10/04/20  8:31 AM   Result Value Ref Range    Glucose (POC) 156 (H) 65 - 100 mg/dL    Performed by Stephanie Gandhi    GLUCOSE, POC    Collection Time: 10/04/20 12:05 PM   Result Value Ref Range    Glucose (POC) 182 (H) 65 - 100 mg/dL    Performed by Stephanie Gandhi          Assessment and plan:      --shock: likely cardiogenic and septic. Levophed and dobutamine. Resolved. Off pressor/dobutamine, s/t  E. Faecalis UTI. levaquin 750 daily from 9/27- d8/10. BP dropped to 60's last night after stopping dopamine. ?? Re consult cardio     --UTI: ucs=E Faecalis, continue levaquin     --AECHFr and pEF, cxr with persistent intersitial edema small effusions. Cont lasix. Cont metoprolol/lisinopril with parameters     --PAD: atorvastatin, Plavix, , ezetimibe     --CYNDI on CKDIII: cardiorenal potential ATN given the hypotension. Resolved cyndi, cr to 0.91    --DMII :lantus 10 and SSI. improved trend, resume januvia, titrate lantus,     --CAD s/p stent/CABG : plavix.       -- Hypotension: Metoprolol dec 12.5 mg bid, lisinopril dec 2.5 mg daily, holding ranexa. Dopamine resumed overnight. Needs further diuresis though bp intolerant. Dobutamine restart? Re consult cardio. --Anorexia: states started about 1 week prior to return. Appetite remains poor. Ccontinue  marinol- discussed and he is agreable. Ranexa initiated last admit? Stopped 2/2 hypotension nonetheless. May contribute to anorexia. --AstheniaGait impairment: unable to ambulate safely, participate in adl/iadl and cannot safely maneuver walker or cane. He appears cognizant and safe to use wheelchair but weakness would limit use of standard wheelchair and I recommend a lightweight chair. Wc would improve pts participation and would assist caregivers in [facility].  Declines rehab, will need lightweight wheelchair for mobility/safety. Pt states ambulatory prior to admit 8/2020, [place] [facility] x 32 days then back inpatient. States progressed to wheelchair at facility but weaker now and not sure can return home like this now. He does not want to return to [facility] but is open to other snf options if available. Will d/w cm. His weakness appears certainly to have progressed again with prolonged hospitalization.        DVT ppx: heparin    DISPO: Pending course, snf rec'd- cn input noted,[facility] dme arranging. He is profoundly weak and now is considering snf though does not want to return to [facility].     Signed By: Nemesio Barreto MD     October 4, 2020

## 2020-10-05 LAB
ANION GAP SERPL CALC-SCNC: 10 MMOL/L (ref 5–15)
BUN SERPL-MCNC: 31 MG/DL (ref 6–20)
BUN/CREAT SERPL: 28 (ref 12–20)
CA-I BLD-MCNC: 8.7 MG/DL (ref 8.5–10.1)
CHLORIDE SERPL-SCNC: 100 MMOL/L (ref 97–108)
CO2 SERPL-SCNC: 23 MMOL/L (ref 21–32)
CREAT SERPL-MCNC: 1.12 MG/DL (ref 0.7–1.3)
GLUCOSE BLD STRIP.AUTO-MCNC: 149 MG/DL (ref 65–100)
GLUCOSE BLD STRIP.AUTO-MCNC: 152 MG/DL (ref 65–100)
GLUCOSE BLD STRIP.AUTO-MCNC: 74 MG/DL (ref 65–100)
GLUCOSE BLD STRIP.AUTO-MCNC: 92 MG/DL (ref 65–100)
GLUCOSE SERPL-MCNC: 161 MG/DL (ref 65–100)
MAGNESIUM SERPL-MCNC: 1.6 MG/DL (ref 1.6–2.4)
PERFORMED BY, TECHID: ABNORMAL
PERFORMED BY, TECHID: ABNORMAL
PERFORMED BY, TECHID: NORMAL
PERFORMED BY, TECHID: NORMAL
POTASSIUM SERPL-SCNC: 3.7 MMOL/L (ref 3.5–5.1)
SODIUM SERPL-SCNC: 133 MMOL/L (ref 136–145)
TROPONIN I SERPL-MCNC: <0.05 NG/ML

## 2020-10-05 PROCEDURE — 74011250636 HC RX REV CODE- 250/636: Performed by: INTERNAL MEDICINE

## 2020-10-05 PROCEDURE — 74011250637 HC RX REV CODE- 250/637: Performed by: INTERNAL MEDICINE

## 2020-10-05 PROCEDURE — 74011250637 HC RX REV CODE- 250/637: Performed by: NURSE PRACTITIONER

## 2020-10-05 PROCEDURE — 80048 BASIC METABOLIC PNL TOTAL CA: CPT

## 2020-10-05 PROCEDURE — 74011636637 HC RX REV CODE- 636/637: Performed by: HOSPITALIST

## 2020-10-05 PROCEDURE — 74011250637 HC RX REV CODE- 250/637: Performed by: PHYSICIAN ASSISTANT

## 2020-10-05 PROCEDURE — 65270000029 HC RM PRIVATE

## 2020-10-05 PROCEDURE — 83735 ASSAY OF MAGNESIUM: CPT

## 2020-10-05 PROCEDURE — 74011250636 HC RX REV CODE- 250/636: Performed by: HOSPITALIST

## 2020-10-05 PROCEDURE — 93005 ELECTROCARDIOGRAM TRACING: CPT

## 2020-10-05 PROCEDURE — 74011250637 HC RX REV CODE- 250/637: Performed by: HOSPITALIST

## 2020-10-05 PROCEDURE — 82962 GLUCOSE BLOOD TEST: CPT

## 2020-10-05 PROCEDURE — 36415 COLL VENOUS BLD VENIPUNCTURE: CPT

## 2020-10-05 PROCEDURE — 97530 THERAPEUTIC ACTIVITIES: CPT

## 2020-10-05 PROCEDURE — 84484 ASSAY OF TROPONIN QUANT: CPT

## 2020-10-05 RX ORDER — INSULIN GLARGINE 100 [IU]/ML
12 INJECTION, SOLUTION SUBCUTANEOUS
Status: DISCONTINUED | OUTPATIENT
Start: 2020-10-05 | End: 2020-10-11

## 2020-10-05 RX ORDER — DOCUSATE SODIUM 100 MG/1
100 CAPSULE, LIQUID FILLED ORAL DAILY
Status: DISCONTINUED | OUTPATIENT
Start: 2020-10-05 | End: 2020-10-11

## 2020-10-05 RX ADMIN — SITAGLIPTIN 50 MG: 100 TABLET, FILM COATED ORAL at 09:04

## 2020-10-05 RX ADMIN — OXYCODONE HYDROCHLORIDE 5 MG: 5 TABLET ORAL at 08:45

## 2020-10-05 RX ADMIN — MIDODRINE HYDROCHLORIDE 10 MG: 5 TABLET ORAL at 12:21

## 2020-10-05 RX ADMIN — FERROUS SULFATE TAB 325 MG (65 MG ELEMENTAL FE) 325 MG: 325 (65 FE) TAB at 08:42

## 2020-10-05 RX ADMIN — DRONABINOL 5 MG: 2.5 CAPSULE ORAL at 08:42

## 2020-10-05 RX ADMIN — HEPARIN SODIUM 5000 UNITS: 10000 INJECTION, SOLUTION INTRAVENOUS; SUBCUTANEOUS at 12:21

## 2020-10-05 RX ADMIN — POTASSIUM CHLORIDE 20 MEQ: 1500 TABLET, EXTENDED RELEASE ORAL at 08:42

## 2020-10-05 RX ADMIN — CLOPIDOGREL BISULFATE 75 MG: 75 TABLET ORAL at 08:42

## 2020-10-05 RX ADMIN — FUROSEMIDE 40 MG: 40 TABLET ORAL at 08:42

## 2020-10-05 RX ADMIN — MULTIVITAMIN TABLET 1 TABLET: TABLET at 08:42

## 2020-10-05 RX ADMIN — LEVOFLOXACIN 750 MG: 5 INJECTION, SOLUTION INTRAVENOUS at 12:22

## 2020-10-05 RX ADMIN — INSULIN LISPRO 2 UNITS: 100 INJECTION, SOLUTION INTRAVENOUS; SUBCUTANEOUS at 08:42

## 2020-10-05 RX ADMIN — DRONABINOL 5 MG: 2.5 CAPSULE ORAL at 17:07

## 2020-10-05 RX ADMIN — RENO CAPS 1 CAPSULE: 100; 1.5; 1.7; 20; 10; 1; 150; 5; 6 CAPSULE ORAL at 08:41

## 2020-10-05 RX ADMIN — EZETIMIBE 10 MG: 10 TABLET ORAL at 08:42

## 2020-10-05 RX ADMIN — Medication 1 TABLET: at 08:42

## 2020-10-05 RX ADMIN — INSULIN LISPRO 2 UNITS: 100 INJECTION, SOLUTION INTRAVENOUS; SUBCUTANEOUS at 12:21

## 2020-10-05 RX ADMIN — ATORVASTATIN CALCIUM 40 MG: 40 TABLET, FILM COATED ORAL at 22:47

## 2020-10-05 RX ADMIN — ACETAMINOPHEN 650 MG: 325 TABLET, FILM COATED ORAL at 06:40

## 2020-10-05 RX ADMIN — DOCUSATE SODIUM 100 MG: 100 CAPSULE, LIQUID FILLED ORAL at 08:42

## 2020-10-05 RX ADMIN — MIDODRINE HYDROCHLORIDE 10 MG: 5 TABLET ORAL at 17:07

## 2020-10-05 RX ADMIN — HEPARIN SODIUM 5000 UNITS: 10000 INJECTION, SOLUTION INTRAVENOUS; SUBCUTANEOUS at 06:04

## 2020-10-05 RX ADMIN — DOPAMINE HYDROCHLORIDE IN DEXTROSE 5 MCG/KG/MIN: 1.6 INJECTION, SOLUTION INTRAVENOUS at 08:35

## 2020-10-05 RX ADMIN — HEPARIN SODIUM 5000 UNITS: 10000 INJECTION, SOLUTION INTRAVENOUS; SUBCUTANEOUS at 22:47

## 2020-10-05 RX ADMIN — PANTOPRAZOLE SODIUM 40 MG: 40 TABLET, DELAYED RELEASE ORAL at 08:42

## 2020-10-05 RX ADMIN — MIDODRINE HYDROCHLORIDE 10 MG: 5 TABLET ORAL at 08:42

## 2020-10-05 RX ADMIN — LEVOTHYROXINE SODIUM 88 MCG: 88 TABLET ORAL at 06:04

## 2020-10-05 NOTE — PROGRESS NOTES
PHYSICAL THERAPY TREATMENT  Patient: Eder Lyman (1 y.o. male)  Date: 10/5/2020  Diagnosis: Heart failure ([facility] [address] [I50.9]  Acute exacerbation of CHF (congestive heart failure) ([facility] [address] [I50.9]  Adult failure to thrive [R62.7]  Nausea & vomiting [R11.2]  Hypotension [I95.9]   <principal problem not specified>       Precautions:    Chart, physical therapy assessment, plan of care and goals were reviewed. ASSESSMENT  Patient continues with skilled PT services and is progressing towards goals. Pt. Semi supine in bed upon arrival and agreeable to therapy session. Complains of general pain in legs, low back and left side of neck. Able to perform bed mobility from semi supine position. Performed 10 repetitions of supine and seated LE TE. Had BM and SN notified. Performed sit to stand with raised bed height to quarter squat, with forward flexed trunk posture. .     Current Level of Function Impacting Discharge (mobility/balance): Poor endurance    Other factors to consider for discharge: PMH         PLAN :  Patient continues to benefit from skilled intervention to address the above impairments. Continue treatment per established plan of care. to address goals. Recommendation for discharge: (in order for the patient to meet his/her long term goals)  Therapy up to 5 days/week in SNF setting    This discharge recommendation:  Has been made in collaboration with the attending provider and/or case management    IF patient discharges home will need the following DME: rollator       SUBJECTIVE:   Patient stated im alright.     OBJECTIVE DATA SUMMARY:   Critical Behavior:  Neurologic State: Alert, Appropriate for age  Orientation Level: Appropriate for age  Cognition: Appropriate for age attention/concentration     Functional Mobility Training:  Bed Mobility:  Rolling: Supervision  Supine to Sit: Supervision  Sit to Supine: Supervision  Scooting:  Moderate assistance        Transfers:  Sit to Stand: Minimum assistance  Stand to Sit: Minimum assistance                             Balance:  Sitting: Intact; With support  Standing: Intact; With support  Standing - Static: Fair  Ambulation/Gait Training:                    Right Side Weight Bearing: Full  Left Side Weight Bearing: Full                                Stairs: Therapeutic Exercises:   Therapeutic Exercises:       EXERCISE   Sets   Reps   Active Active Assist   Passive Self ROM   Comments   Ankle Pumps  10 [x] [] [] []    Quad Sets/Glut Sets  10 [x] [] [] []    Hamstring Sets   [] [] [] []    Short Arc Quads   [] [] [] []    Heel Slides  10 [x] [] [] []    Straight Leg Raises   [] [] [] []    Hip abd/add  10 [x] [] [] []    Long Arc Quads  10 [x] [] [] []    Marching  10 [x] [] [] []       [] [] [] []        Pain Ratin/10    Activity Tolerance:   Poor  Please refer to the flowsheet for vital signs taken during this treatment. After treatment patient left in no apparent distress:   Supine in bed    COMMUNICATION/COLLABORATION:   The patients plan of care was discussed with: Physical therapy assistant.      Mery Finney   Time Calculation: 30 mins

## 2020-10-05 NOTE — PROGRESS NOTES
Progress Note    Patient: Maria Luisa Dunn MRN: [id number]  SSN: [id number]    YOB: 1940  Age: 79 y.o. Sex: male      Admit Date: 9/22/2020    LOS: 12 days     Subjective:     80F, H/o AECHFr and pEF with AICD, PAD, CAD s/p stent with septic shock s.t UTI/ e faecalis on levaquin, sepsis/aechf resolved. Bullous pressure lesion L lower left s/p I&D 9/24. Seen at bedside. Alert and oriented,   + chest pain under eval, cardio appreciated. + sob stable, better from admit. Dopamine stopped. Poor apetite resumes. BG's improved trend  cxr 10/4=Persistent interstitial edema with small bilateral pleural  effusions.       Review of Systems   Constitutional: Positive for malaise/fatigue/anorexia  HENT: Negative.    Eyes: Negative.    Respiratory: Positive for shortness of breath   Cardiovascular:+chest pain  Gastrointestinal: negative for heartburn, nausea or vomiting  Genitourinary: Negative.    Musculoskeletal: Negative.    Skin: Negative.    Neurological: Negative.    Endo/Heme/Allergies: Negative.    Psychiatric/Behavioral: Negative.      Prior to Admission Medications   Prescriptions Last Dose Informant Patient Reported? Taking? JANUVIA 100 mg tablet 9/22/2020  Yes Yes   Sig: TAKE ONE TABLET BY MOUTH ONE TIME DAILY   atorvastatin (LIPITOR) 40 mg tablet 9/22/2020  Yes Yes   Sig: TAKE ONE TABLET BY MOUTH ONE TIME DAILY AT BEDTIME   cholecalciferol (VITAMIN D3) 1,000 unit tablet 9/22/2020  Yes Yes   Sig: Take  by mouth daily.    clopidogrel (PLAVIX) 75 mg tab 9/22/2020  Yes Yes   Sig: TAKE ONE TABLET BY MOUTH ONE TIME DAILY   cyanocobalamin (VITAMIN B12) 1,000 mcg/mL injection 9/22/2020  Yes Yes   Sig: INJECT 1 ML SUBCUTANEOUSLY ONCE MONTHLY   ezetimibe (ZETIA) 10 mg tablet 9/22/2020  Yes Yes   Sig: TAKE ONE TABLET BY MOUTH AT BEDTIME   ferrous sulfate 325 mg (65 mg iron) tablet 9/22/2020  Yes Yes   Sig: TAKE ONE TABLET BY MOUTH TWICE A DAY   furosemide (LASIX) 40 mg tablet 9/22/2020  Yes Yes   Sig: TAKE ONE TABLET BY MOUTH ONE TIME DAILY   insulin detemir (LEVEMIR FLEXTOUCH U-100 INSULN SC) 2020  Yes Yes   Sig: by SubCUTAneous route. Indications: as needed for BS > 150 once a day then take 10 units   levothyroxine (SYNTHROID) 88 mcg tablet 2020  Yes Yes   Sig: TAKE ONE TABLET BY MOUTH EVERY MORNING   lisinopril (PRINIVIL, ZESTRIL) 5 mg tablet 2020  Yes Yes   Si.5 mg daily   metoprolol succinate (TOPROL-XL) 25 mg XL tablet 2020  Yes Yes   Sig: TAKE ONE TABLET BY MOUTH ONE TIME DAILY   nitroglycerin (NITROSTAT) 0.4 mg SL tablet 2020  Yes Yes   Si.4 mg by SubLINGual route every five (5) minutes as needed for Chest Pain. Up to 3 doses.    pantoprazole (PROTONIX) 40 mg tablet 2020  Yes Yes   Sig: TAKE ONE TABLET BY MOUTH EVERY MORNING   ranolazine ER (RANEXA) 1,000 mg 2020  Yes Yes   Sig: TAKE ONE TABLET BY MOUTH TWICE A DAY      Facility-Administered Medications: None       Current Facility-Administered Medications:     docusate sodium (COLACE) capsule 100 mg, 100 mg, Oral, DAILY, Osmany Nascimento MD, 100 mg at 10/05/20 9095    potassium chloride (K-DUR, KLOR-CON) SR tablet 20 mEq, 20 mEq, Oral, DAILY, Maurilio Hurt MD, 20 mEq at 10/05/20 5749    dronabinoL (MARINOL) capsule 5 mg, 5 mg, Oral, ACB&D, Maurilio Hurt MD, 5 mg at 10/05/20 1707    insulin glargine (LANTUS) injection 15 Units, 15 Units, SubCUTAneous, QHS, Maurilio Portillo MD, 15 Units at 10/04/20 2200    midodrine (PROAMATINE) tablet 10 mg, 10 mg, Oral, TID WITH MEALS, Maurilio Hurt MD, 10 mg at 10/05/20 1707    levoFLOXacin (LEVAQUIN) 750 mg in D5W IVPB, 750 mg, IntraVENous, Q48H, Mariana Ortiz MD, Last Rate: 100 mL/hr at 10/05/20 1222, 750 mg at 10/05/20 1222    heparin porcine injection 5,000 Units, 5,000 Units, SubCUTAneous, Q8H, Nelson Gomez MD, 5,000 Units at 10/05/20 1221    glucose chewable tablet 16 g, 4 Tab, Oral, PRN, Nelson Gomez MD  [facility] glucagon (GLUCAGEN) injection 1 mg, 1 mg, IntraMUSCular, PRN, Faustino Miller MD    dextrose (D50W) injection syrg 12.5-25 g, 25-50 mL, IntraVENous, PRN, Faustino Miller MD    atorvastatin (LIPITOR) tablet 40 mg, 40 mg, Oral, QHS, Kelly Wilson MD, 40 mg at 10/04/20 2233    cholecalciferol (VITAMIN D3) (1000 Units /25 mcg) tablet 1 Tab, 1,000 Units, Oral, DAILY, Kelly Wilson MD, 1 Tab at 10/05/20 0842    clopidogreL (PLAVIX) tablet 75 mg, 75 mg, Oral, DAILY, Kelly Wilson MD, 75 mg at 10/05/20 0842    ezetimibe (ZETIA) tablet 10 mg, 10 mg, Oral, DAILY, Kelly Wilson MD, 10 mg at 10/05/20 5941    ferrous sulfate tablet 325 mg, 1 Tab, Oral, DAILY WITH BREAKFAST, Kelly Wilson MD, 325 mg at 10/05/20 0842    furosemide (LASIX) tablet 40 mg, 40 mg, Oral, DAILY, Kelly Wilson MD, 40 mg at 10/05/20 0842    SITagliptin (JANUVIA) tablet tab 50 mg, 50 mg, Oral, DAILY, Kelly Wilson MD, 50 mg at 10/05/20 0904    levothyroxine (SYNTHROID) tablet 88 mcg, 88 mcg, Oral, 6am, Kelly Wilson MD, 88 mcg at 10/05/20 0604    pantoprazole (PROTONIX) tablet 40 mg, 40 mg, Oral, ACB, Kelly Wilson MD, 40 mg at 10/05/20 0842    [Held by provider] ranolazine ER (RANEXA) tablet 500 mg, 500 mg, Oral, BID, Kelly Wilson MD, Stopped at 09/25/20 0900    insulin lispro (HUMALOG) injection, , SubCUTAneous, AC&HS, Kelly Wilson MD, Stopped at 10/05/20 1630    glucose chewable tablet 16 g, 4 Tab, Oral, PRN, Kelly Wilson MD    dextrose (D50W) injection syrg 12.5-25 g, 25-50 mL, IntraVENous, PRN, Kelly Wilson MD    glucagon (GLUCAGEN) injection 1 mg, 1 mg, IntraMUSCular, PRN, Kelly Wilson MD    multivitamin (ONE A DAY) tablet 1 Tab, 1 Tab, Oral, DAILY, Kelly Wilson MD, 1 Tab at 10/05/20 8943    B complex-vitaminC-folic acid (NEPHROCAP) cap, 1 Cap, Oral, DAILY, Kelly Wilson MD, 1 Cap at 10/05/20 0841    acetaminophen (TYLENOL) tablet 650 mg, 650 mg, Oral, Q4H PRN, Liliya Nunes NP, 650 mg at 10/05/20 0640    oxyCODONE IR (ROXICODONE) tablet 5 mg, 5 mg, Oral, Q6H PRN, JEFFERSON Lubin-C, 5 mg at 10/05/20 0845    Objective:     Vitals:    10/05/20 0400 10/05/20 0744 10/05/20 1102 10/05/20 1554   BP:  (!) 122/53 (!) 95/53 (!) 105/51   Pulse:  77 75 72   Resp:  18 20 18   Temp:  97.8 °F (36.6 °C) 97.5 °F (36.4 °C) 98.7 °F (37.1 °C)   SpO2:  97% 97% 100%   Weight: 95.9 kg (211 lb 6.7 oz)      Height:            Intake and Output:  Current Shift: No intake/output data recorded. Last three shifts: No intake/output data recorded. Physical Exam:   General : Appearance:  no respiratory distress noted. HEENT : EOMI, normal oral mucosa, atraumatic normocephalic, Normal ear and nose. Neck : Supple, no JVD, no masses noted, no carotid bruit  Lungs : normal breath sounds.  No wheezing, no rales.  nonlabored resp. CVS : Rhythm rate regular, S1+, S2+, no murmur or gallop  Abdomen : Soft, nontender, no organomegaly, bowel sounds active  Extremities : No edema noted,  pedal pulses palpable  Musculoskeletal : Fair range of motion, no joint swelling or effusion, weak generally  Skin : Moist, warm, skin turgor, no pathological rash  Lymphatic : No cervical lymphadenopathy.   Neurological : Awake, alert, oriented to time place person.  No neurological deficits.    Psychiatric : Mood and affect appears appropriate to the situation.     Lab/Data Review:  Recent Results (from the past 24 hour(s))   GLUCOSE, POC    Collection Time: 10/04/20 10:18 PM   Result Value Ref Range    Glucose (POC) 228 (H) 65 - 100 mg/dL    Performed by Tyler Gallegos    GLUCOSE, POC    Collection Time: 10/05/20  7:48 AM   Result Value Ref Range    Glucose (POC) 152 (H) 65 - 100 mg/dL    Performed by Christopher Mohan, BASIC    Collection Time: 10/05/20  9:30 AM   Result Value Ref Range    Sodium 133 (L) 136 - 145 mmol/L    Potassium 3.7 3.5 - 5.1 mmol/L    Chloride 100 97 - 108 mmol/L    CO2 23 21 - 32 mmol/L    Anion gap 10 5 - 15 mmol/L    Glucose 161 (H) 65 - 100 mg/dL    BUN 31 (H) 6 - 20 mg/dL    Creatinine 1.12 0.70 - 1.30 mg/dL    BUN/Creatinine ratio 28 (H) 12 - 20      GFR est AA >60 >60 ml/min/1.73m2    GFR est non-AA >60 >60 ml/min/1.73m2    Calcium 8.7 8.5 - 10.1 mg/dL   MAGNESIUM    Collection Time: 10/05/20  9:30 AM   Result Value Ref Range    Magnesium 1.6 1.6 - 2.4 mg/dL   GLUCOSE, POC    Collection Time: 10/05/20 11:04 AM   Result Value Ref Range    Glucose (POC) 149 (H) 65 - 100 mg/dL    Performed by Orbie Lanes    GLUCOSE, POC    Collection Time: 10/05/20  3:57 PM   Result Value Ref Range    Glucose (POC) 92 65 - 100 mg/dL    Performed by [employer]    TROPONIN I    Collection Time: 10/05/20  4:55 PM   Result Value Ref Range    Troponin-I, Qt. <0.05 <0.05 ng/mL         Assessment and plan:      --shock: likely cardiogenic and septic. Levophed and dobutamine. Resolved. Off pressor/dobutamine, s/t  E. Faecalis UTI. levaquin 750 daily from 9/27- d9/10. Has been back on dopamine 2/2 low bp, cardio following, dopamine stopped as bp appears adequate. Holding bp rx    --Chest pain, trop remains neg. Cont per cardio     --UTI: ucs=E Faecalis, continue levaquin d9/10     --AECHFr and pEF, cxr with persistent intersitial edema small effusions. Cont lasix. metoprolol/lisinopril held 2/2 low bp.     --PAD: atorvastatin, Plavix, , ezetimibe     --FLORENCIO on CKDIII: cardiorenal potential ATN given the hypotension. Resolved florencio, cr to 1.12    --DMII :lantus 10 and SSI. improved trend, resume januvia, titrate lantus,     --CAD s/p stent/CABG : plavix.       -- Hypotension: holding metop/lisinopril. Dopamine stopped today, follow closeley as dumped bp when stopped dopamine last. Continue midodrine. --Anorexia: states started about 1 week prior to return. Appetite remains poor.  Ccontinue  marinol- discussed and he is agreable. Ranexa initiated last admit? Stopped 2/2 hypotension nonetheless. May contribute to anorexia. --AstheniaGait impairment: unable to ambulate safely, participate in adl/iadl and cannot safely maneuver walker or cane. He appears cognizant and safe to use wheelchair but weakness would limit use of standard wheelchair and I recommend a lightweight chair. Wc would improve pts participation and would assist caregivers in [facility]. Declines rehab, will need lightweight wheelchair for mobility/safety. Pt states ambulatory prior to admit 8/2020, [facility] x 32 days then back inpatient. States progressed to wheelchair at facility but weaker now and not sure can return home like this now. He does not want to return to [facility]. Family wants him home not snf. DME arranged. Will need to reassess for home O2 prior to dc. His weakness appears certainly to have progressed again with prolonged hospitalization.        DVT ppx: heparin    DISPO: Pending course, snf rec'd- family declines snf. Home with [facility] services when stable. Check for O2 home need prior to dc.      Signed By: Hugo Saeed MD     October 5, 2020

## 2020-10-05 NOTE — PROGRESS NOTES
Progress Note    Patient: Rc Moore MRN: [id number]  SSN: [id number]    YOB: 1940  Age: 79 y.o. Sex: male      Admit Date: 9/22/2020    LOS: 12 days     Subjective:   Patient is having some intermittent left-sided chest pain. He was started on dopamine for hypotension. Has some intermittent dizzy spells. Objective:     Vitals:    10/05/20 0324 10/05/20 0400 10/05/20 0744 10/05/20 1102   BP: (!) 131/59  (!) 122/53 (!) 95/53   Pulse: 76  77 75   Resp: 20 18 20   Temp: 98 °F (36.7 °C)  97.8 °F (36.6 °C) 97.5 °F (36.4 °C)   SpO2: 95%  97% 97%   Weight:  95.9 kg (211 lb 6.7 oz)     Height:            Intake and Output:  Current Shift: No intake/output data recorded. Last three shifts: No intake/output data recorded. Physical Exam:   General:  Alert, cooperative, no distress, appears stated age. Eyes:  Conjunctivae/corneas clear. PERRL, EOMs intact. Fundi benign   Ears:  Normal TMs and external ear canals both ears. Nose: Nares normal. Septum midline. Mucosa normal. No drainage or sinus tenderness. Mouth/Throat: Lips, mucosa, and tongue normal. Teeth and gums normal.   Neck: Supple, symmetrical, trachea midline, no adenopathy, thyroid: no enlargment/tenderness/nodules, no carotid bruit and no JVD. Back:   Symmetric, no curvature. ROM normal. No CVA tenderness. Lungs:   Clear to auscultation bilaterally. Heart:  Regular rate and rhythm, S1, S2 normal, no murmur, click, rub or gallop. Abdomen:   Soft, non-tender. Bowel sounds normal. No masses,  No organomegaly. Extremities: Extremities normal, atraumatic, no cyanosis or edema. Pulses: 2+ and symmetric all extremities. Skin: Skin color, texture, turgor normal. No rashes or lesions   Lymph nodes: Cervical, supraclavicular, and axillary nodes normal.   Neurologic: CNII-XII intact. Normal strength, sensation and reflexes throughout. Lab/Data Review: All lab results for the last 24 hours reviewed. Assessment:     Active Problems:    Heart failure ([facility] [address] (9/23/2020)      Adult failure to thrive (9/23/2020)      Nausea & vomiting (9/23/2020)      Hypotension (9/23/2020)      Acute exacerbation of CHF (congestive heart failure) ([facility] [address] (9/23/2020)    Mr. Zeyad Spear is an 26-year-old -American male with:   1. 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 [employer] September 2017   2. Chest pain atypical for angina   3. Coronary artery disease (previous MI and status post coronary CABG)   4. Hypertension with hypertensive heart disease   5. Mixed Dyslipidemia      6.  Peripheral vessel disease   7. Type 2 diabetes mellitus   8. Former tobacco use   9. History of thyroid disease   10. Moderate pulmonary hypertension, RVSP 57 mmHg   11. History of bleeding per rectum and moderately severe diverticulosis of the descending colon and sigmoid colon. Plan: We will check 12-lead EKG. Will trend troponin. Patient is complaining of intermittent chest pain. I stopped his antihypertensives. We will stop dopamine as his blood pressure in the 130s. He is currently on atorvastatin, Plavix, ezetimibe, Lasix and midodrine. Further recommendation depending on the results of troponin and clinical progression.           Signed By: Jose Arroyo MD     October 5, 2020

## 2020-10-05 NOTE — PROGRESS NOTES
BRI received a call from patient's daughter, Sage Strange, to request an update. BRI explained to Ms. Sage Strange that there was a co-pay for the wheelchair and O2 however there was a card on file [facility] for the patient, she asked for the other co-pay's to be put on the same card. BRI asked about SNF again to see if the family had changed their mind, she again stated she does not want the patient to go to SNF At this time and asked about PCA services for the patient. BRI explained that patient would need medicaid in order for it to be covered by insurance, Ms. Sage Strange stated that her sister, Faisal Zhou [phone], has information on his fiances and asked that ESS contact her. BRI has sent a e-mail requesting a screen. CM awaiting a response. Patient will require new resp testing and O2 orders at most 48 hours before d/c.      BRI continues to follow.

## 2020-10-05 NOTE — PROGRESS NOTES
Problem: Pressure Injury - Risk of  Goal: *Prevention of pressure injury  Description: Document John Scale and appropriate interventions in the flowsheet. Outcome: Progressing Towards Goal  Note: Pressure Injury Interventions:  Sensory Interventions: Assess changes in LOC, Assess need for specialty bed, Discuss PT/OT consult with provider, Float heels, Keep linens dry and wrinkle-free, Minimize linen layers, Pad between skin to skin    Moisture Interventions: Absorbent underpads, Minimize layers, Moisture barrier    Activity Interventions: Increase time out of bed, PT/OT evaluation    Mobility Interventions: Float heels, PT/OT evaluation    Nutrition Interventions: Document food/fluid/supplement intake    Friction and Shear Interventions: Minimize layers                Problem: Patient Education: Go to Patient Education Activity  Goal: Patient/Family Education  Outcome: Progressing Towards Goal     Problem: Falls - Risk of  Goal: *Absence of Falls  Description: Document Kristi Fall Risk and appropriate interventions in the flowsheet.   Outcome: Progressing Towards Goal  Note: Fall Risk Interventions:  Mobility Interventions: Bed/chair exit alarm, Patient to call before getting OOB, PT Consult for mobility concerns         Medication Interventions: Bed/chair exit alarm, Patient to call before getting OOB    Elimination Interventions: Call light in reach, Patient to call for help with toileting needs, Urinal in reach              Problem: Patient Education: Go to Patient Education Activity  Goal: Patient/Family Education  Outcome: Progressing Towards Goal     Problem: Hypotension  Goal: *Blood pressure within specified parameters  Outcome: Progressing Towards Goal  Goal: *Fluid volume balance  Outcome: Progressing Towards Goal     Problem: Nutrition Deficit  Goal: *Optimize nutritional status  Outcome: Progressing Towards Goal     Problem: Patient Education: Go to Patient Education Activity  Goal: Patient/Family Education  Outcome: Progressing Towards Goal     Problem: Patient Education: Go to Patient Education Activity  Goal: Patient/Family Education  Outcome: Progressing Towards Goal

## 2020-10-06 LAB
ANION GAP SERPL CALC-SCNC: 8 MMOL/L (ref 5–15)
ATRIAL RATE: 64 BPM
ATRIAL RATE: 75 BPM
BASOPHILS # BLD: 0 K/UL (ref 0–0.1)
BASOPHILS NFR BLD: 0 % (ref 0–1)
BUN SERPL-MCNC: 34 MG/DL (ref 6–20)
BUN/CREAT SERPL: 27 (ref 12–20)
CA-I BLD-MCNC: 8.8 MG/DL (ref 8.5–10.1)
CALCULATED P AXIS, ECG09: 141 DEGREES
CALCULATED P AXIS, ECG09: 89 DEGREES
CALCULATED R AXIS, ECG10: -33 DEGREES
CALCULATED R AXIS, ECG10: -76 DEGREES
CALCULATED T AXIS, ECG11: 146 DEGREES
CALCULATED T AXIS, ECG11: 96 DEGREES
CHLORIDE SERPL-SCNC: 99 MMOL/L (ref 97–108)
CO2 SERPL-SCNC: 27 MMOL/L (ref 21–32)
CREAT SERPL-MCNC: 1.27 MG/DL (ref 0.7–1.3)
DIAGNOSIS, 93000: NORMAL
DIAGNOSIS, 93000: NORMAL
DIFFERENTIAL METHOD BLD: ABNORMAL
EOSINOPHIL # BLD: 0 K/UL (ref 0–0.4)
EOSINOPHIL NFR BLD: 0 % (ref 0–7)
ERYTHROCYTE [DISTWIDTH] IN BLOOD BY AUTOMATED COUNT: 16.6 % (ref 11.5–14.5)
GLUCOSE BLD STRIP.AUTO-MCNC: 106 MG/DL (ref 65–100)
GLUCOSE BLD STRIP.AUTO-MCNC: 129 MG/DL (ref 65–100)
GLUCOSE BLD STRIP.AUTO-MCNC: 187 MG/DL (ref 65–100)
GLUCOSE BLD STRIP.AUTO-MCNC: 230 MG/DL (ref 65–100)
GLUCOSE SERPL-MCNC: 220 MG/DL (ref 65–100)
HCT VFR BLD AUTO: 26.3 % (ref 36.6–50.3)
HGB BLD-MCNC: 8.8 G/DL (ref 12.1–17)
IMM GRANULOCYTES # BLD AUTO: 0 K/UL
IMM GRANULOCYTES NFR BLD AUTO: 0 %
LYMPHOCYTES # BLD: 1.1 K/UL (ref 0.8–3.5)
LYMPHOCYTES NFR BLD: 8 % (ref 12–49)
MCH RBC QN AUTO: 26.7 PG (ref 26–34)
MCHC RBC AUTO-ENTMCNC: 33.5 G/DL (ref 30–36.5)
MCV RBC AUTO: 79.7 FL (ref 80–99)
MONOCYTES # BLD: 0.8 K/UL (ref 0–1)
MONOCYTES NFR BLD: 6 % (ref 5–13)
NEUTS SEG # BLD: 11.3 K/UL (ref 1.8–8)
NEUTS SEG NFR BLD: 86 % (ref 32–75)
NRBC # BLD: 0 K/UL (ref 0–0.01)
NRBC BLD-RTO: 0 PER 100 WBC
P-R INTERVAL, ECG05: 158 MS
PERFORMED BY, TECHID: ABNORMAL
PLATELET # BLD AUTO: 581 K/UL (ref 150–400)
PMV BLD AUTO: 10.1 FL (ref 8.9–12.9)
POTASSIUM SERPL-SCNC: 4.6 MMOL/L (ref 3.5–5.1)
Q-T INTERVAL, ECG07: 526 MS
Q-T INTERVAL, ECG07: 532 MS
QRS DURATION, ECG06: 152 MS
QRS DURATION, ECG06: 154 MS
QTC CALCULATION (BEZET), ECG08: 602 MS
QTC CALCULATION (BEZET), ECG08: 603 MS
RBC # BLD AUTO: 3.3 M/UL (ref 4.1–5.7)
RBC MORPH BLD: ABNORMAL
SODIUM SERPL-SCNC: 134 MMOL/L (ref 136–145)
VENTRICULAR RATE, ECG03: 77 BPM
VENTRICULAR RATE, ECG03: 79 BPM
WBC # BLD AUTO: 13.2 K/UL (ref 4.1–11.1)

## 2020-10-06 PROCEDURE — 74011250637 HC RX REV CODE- 250/637: Performed by: INTERNAL MEDICINE

## 2020-10-06 PROCEDURE — 74011636637 HC RX REV CODE- 636/637: Performed by: INTERNAL MEDICINE

## 2020-10-06 PROCEDURE — 93005 ELECTROCARDIOGRAM TRACING: CPT

## 2020-10-06 PROCEDURE — 97110 THERAPEUTIC EXERCISES: CPT

## 2020-10-06 PROCEDURE — 80048 BASIC METABOLIC PNL TOTAL CA: CPT

## 2020-10-06 PROCEDURE — 74011636637 HC RX REV CODE- 636/637: Performed by: HOSPITALIST

## 2020-10-06 PROCEDURE — 82962 GLUCOSE BLOOD TEST: CPT

## 2020-10-06 PROCEDURE — 97116 GAIT TRAINING THERAPY: CPT

## 2020-10-06 PROCEDURE — 77010033678 HC OXYGEN DAILY

## 2020-10-06 PROCEDURE — 97530 THERAPEUTIC ACTIVITIES: CPT

## 2020-10-06 PROCEDURE — 65270000029 HC RM PRIVATE

## 2020-10-06 PROCEDURE — 36415 COLL VENOUS BLD VENIPUNCTURE: CPT

## 2020-10-06 PROCEDURE — 74011250637 HC RX REV CODE- 250/637: Performed by: PHYSICIAN ASSISTANT

## 2020-10-06 PROCEDURE — 74011250636 HC RX REV CODE- 250/636: Performed by: HOSPITALIST

## 2020-10-06 PROCEDURE — 85025 COMPLETE CBC W/AUTO DIFF WBC: CPT

## 2020-10-06 PROCEDURE — 74011250637 HC RX REV CODE- 250/637: Performed by: HOSPITALIST

## 2020-10-06 PROCEDURE — 94760 N-INVAS EAR/PLS OXIMETRY 1: CPT

## 2020-10-06 RX ORDER — POLYETHYLENE GLYCOL 3350 17 G/17G
17 POWDER, FOR SOLUTION ORAL DAILY
Status: DISCONTINUED | OUTPATIENT
Start: 2020-10-06 | End: 2020-10-06

## 2020-10-06 RX ORDER — POLYETHYLENE GLYCOL 3350 17 G/17G
17 POWDER, FOR SOLUTION ORAL DAILY PRN
Status: DISCONTINUED | OUTPATIENT
Start: 2020-10-06 | End: 2020-10-11

## 2020-10-06 RX ORDER — BISACODYL 5 MG
10 TABLET, DELAYED RELEASE (ENTERIC COATED) ORAL
Status: COMPLETED | OUTPATIENT
Start: 2020-10-06 | End: 2020-10-06

## 2020-10-06 RX ADMIN — FUROSEMIDE 40 MG: 40 TABLET ORAL at 08:33

## 2020-10-06 RX ADMIN — RENO CAPS 1 CAPSULE: 100; 1.5; 1.7; 20; 10; 1; 150; 5; 6 CAPSULE ORAL at 08:33

## 2020-10-06 RX ADMIN — INSULIN LISPRO 3 UNITS: 100 INJECTION, SOLUTION INTRAVENOUS; SUBCUTANEOUS at 16:52

## 2020-10-06 RX ADMIN — MIDODRINE HYDROCHLORIDE 10 MG: 5 TABLET ORAL at 08:33

## 2020-10-06 RX ADMIN — MULTIVITAMIN TABLET 1 TABLET: TABLET at 08:33

## 2020-10-06 RX ADMIN — HEPARIN SODIUM 5000 UNITS: 10000 INJECTION, SOLUTION INTRAVENOUS; SUBCUTANEOUS at 05:01

## 2020-10-06 RX ADMIN — OXYCODONE HYDROCHLORIDE 5 MG: 5 TABLET ORAL at 01:43

## 2020-10-06 RX ADMIN — INSULIN LISPRO 2 UNITS: 100 INJECTION, SOLUTION INTRAVENOUS; SUBCUTANEOUS at 12:24

## 2020-10-06 RX ADMIN — EZETIMIBE 10 MG: 10 TABLET ORAL at 08:33

## 2020-10-06 RX ADMIN — MIDODRINE HYDROCHLORIDE 10 MG: 5 TABLET ORAL at 12:24

## 2020-10-06 RX ADMIN — CLOPIDOGREL BISULFATE 75 MG: 75 TABLET ORAL at 08:33

## 2020-10-06 RX ADMIN — POTASSIUM CHLORIDE 20 MEQ: 1500 TABLET, EXTENDED RELEASE ORAL at 08:33

## 2020-10-06 RX ADMIN — FERROUS SULFATE TAB 325 MG (65 MG ELEMENTAL FE) 325 MG: 325 (65 FE) TAB at 08:33

## 2020-10-06 RX ADMIN — SITAGLIPTIN 50 MG: 100 TABLET, FILM COATED ORAL at 09:41

## 2020-10-06 RX ADMIN — ATORVASTATIN CALCIUM 40 MG: 40 TABLET, FILM COATED ORAL at 23:40

## 2020-10-06 RX ADMIN — INSULIN GLARGINE 12 UNITS: 100 INJECTION, SOLUTION SUBCUTANEOUS at 23:40

## 2020-10-06 RX ADMIN — BISACODYL 10 MG: 5 TABLET, COATED ORAL at 16:52

## 2020-10-06 RX ADMIN — DRONABINOL 5 MG: 2.5 CAPSULE ORAL at 08:33

## 2020-10-06 RX ADMIN — HEPARIN SODIUM 5000 UNITS: 10000 INJECTION, SOLUTION INTRAVENOUS; SUBCUTANEOUS at 23:40

## 2020-10-06 RX ADMIN — HEPARIN SODIUM 5000 UNITS: 10000 INJECTION, SOLUTION INTRAVENOUS; SUBCUTANEOUS at 12:24

## 2020-10-06 RX ADMIN — PANTOPRAZOLE SODIUM 40 MG: 40 TABLET, DELAYED RELEASE ORAL at 08:33

## 2020-10-06 RX ADMIN — LEVOTHYROXINE SODIUM 88 MCG: 88 TABLET ORAL at 05:01

## 2020-10-06 RX ADMIN — DRONABINOL 5 MG: 2.5 CAPSULE ORAL at 16:52

## 2020-10-06 RX ADMIN — DOCUSATE SODIUM 100 MG: 100 CAPSULE, LIQUID FILLED ORAL at 08:33

## 2020-10-06 RX ADMIN — Medication 1 TABLET: at 08:33

## 2020-10-06 RX ADMIN — OXYCODONE HYDROCHLORIDE 5 MG: 5 TABLET ORAL at 23:40

## 2020-10-06 RX ADMIN — OXYCODONE HYDROCHLORIDE 5 MG: 5 TABLET ORAL at 09:41

## 2020-10-06 RX ADMIN — MIDODRINE HYDROCHLORIDE 10 MG: 5 TABLET ORAL at 16:52

## 2020-10-06 RX ADMIN — POLYETHYLENE GLYCOL 3350 17 G: 17 POWDER, FOR SOLUTION ORAL at 05:01

## 2020-10-06 NOTE — PROGRESS NOTES
Progress Note    Patient: Jeannette Hair MRN: [id number]  SSN: [id number]    YOB: 1940  Age: 79 y.o. Sex: male      Admit Date: 9/22/2020    LOS: 13 days     Subjective:   No acute events overnight. Denied having any chest pain or shortness of breath. Complaining of some constipation and he said last bowel movement was about 6 days ago but apparently he had a bowel movement this morning. Objective:     Vitals:    10/06/20 0400 10/06/20 0726 10/06/20 1102 10/06/20 1436   BP: (!) 99/48 (!) 113/50 (!) 118/54 (!) 116/51   Pulse: 66 72 68 71   Resp: 20 20 20 20   Temp: 98.5 °F (36.9 °C) 98.3 °F (36.8 °C) 98.5 °F (36.9 °C) 97.7 °F (36.5 °C)   SpO2: 98% 97% 96% 97%   Weight:       Height:            Intake and Output:  Current Shift: No intake/output data recorded. Last three shifts: 10/04 1901 - 10/06 0700  In: 80 [P.O.:580]  Out: -     Physical Exam:   General:  Alert, cooperative, no distress, appears stated age. Eyes:  Conjunctivae/corneas clear. PERRL, EOMs intact. Fundi benign   Ears:  Normal TMs and external ear canals both ears. Nose: Nares normal. Septum midline. Mucosa normal. No drainage or sinus tenderness. Mouth/Throat: Lips, mucosa, and tongue normal. Teeth and gums normal.   Neck: Supple, symmetrical, trachea midline, no adenopathy, thyroid: no enlargment/tenderness/nodules, no carotid bruit and no JVD. Back:   Symmetric, no curvature. ROM normal. No CVA tenderness. Lungs:   Clear to auscultation bilaterally. Heart:  Regular rate and rhythm, S1, S2 normal, no murmur, click, rub or gallop. Abdomen:   Soft, non-tender. Bowel sounds normal. No masses,  No organomegaly. Extremities: Extremities normal, atraumatic, no cyanosis or edema. Pulses: 2+ and symmetric all extremities. Skin: Skin color, texture, turgor normal. No rashes or lesions   Lymph nodes: Cervical, supraclavicular, and axillary nodes normal.   Neurologic: CNII-XII intact.  Normal strength, sensation and reflexes throughout. Lab/Data Review: All lab results for the last 24 hours reviewed. Assessment:     Active Problems:    Heart failure ([facility] [address] (9/23/2020)      Adult failure to thrive (9/23/2020)      Nausea & vomiting (9/23/2020)      Hypotension (9/23/2020)      Acute exacerbation of CHF (congestive heart failure) ([facility] [address] (9/23/2020)    Mr. Madhav Stacy is an 56-year-old -American male with:   1. 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 [employer] September 2017   2. Chest pain atypical for angina   3. Coronary artery disease (previous MI and status post coronary CABG)   4. Hypertension with hypertensive heart disease   5. Mixed Dyslipidemia      6.  Peripheral vessel disease   7. Type 2 diabetes mellitus   8. Former tobacco use   9. History of thyroid disease   10. Moderate pulmonary hypertension, RVSP 57 mmHg   11. History of bleeding per rectum and moderately severe diverticulosis of the descending colon and sigmoid colon. Plan:   Troponin were okay. Blood pressure is acceptable. Continue holding off antihypertensives and continue midodrine for now. Encourage more calorie intake. No further cardiac testing is planned at this time.         Signed By: Diony Moreno MD     October 6, 2020

## 2020-10-06 NOTE — PROGRESS NOTES
Problem: Hypotension  Goal: *Blood pressure within specified parameters  Outcome: Progressing Towards Goal  Note: BP still soft.  Midodrine given     Problem: Nutrition Deficit  Goal: *Optimize nutritional status  Outcome: Not Progressing Towards Goal  Note: Patient has poor appetite but ensures are being given as supplements

## 2020-10-06 NOTE — PROGRESS NOTES
Problem: Mobility Impaired (Adult and Pediatric)  Goal: *Acute Goals and Plan of Care (Insert Text)  Description: Pt will be I with LE HEP in 7 days. Pt will perform bed mobility with min A x1 in 7 days. Pt will perform transfers with min Ax1 in 7 days. Pt will amb 10-25 feet with LRAD safely with mod I in 7 days. Outcome: Progressing Towards Goal   PHYSICAL THERAPY REEVALUATION  Patient: Neville Velasco (83 y.o. male)  Date: 10/6/2020  Primary Diagnosis: Heart failure ([facility] [address] [I50.9]  Acute exacerbation of CHF (congestive heart failure) ([facility]) [I50.9]  Adult failure to thrive [R62.7]  Nausea & vomiting [R11.2]  Hypotension [I95.9]        Precautions: fall, decreased endurance with increased SOB and dropping O2 sats         ASSESSMENT  Based on the objective data described below, the patient presents with decreased LE strength, decreased endurance with bed mobility requiring min-mod A, difficulty with transfers requiring mod-max A and difficulty with gait requiring RW and mod A for safety. Pt sat EOB performing exercise for LE's and pt was very fatigued after this. Pt went on to work with OT at [facility] performing self care tasks while I obtained a bedside commode as he was asking to sit on the commode due to pain from constipation. Pt required mod A for sit to stand and once standing, pt was found to have already started having a BM. Pt was transferred to the commode to finish and then cleaned up by OT once finished. At this point, pt was very fatigued and had difficulty standing to get wiped up and had to be quickly transferred to the chair with increased SOB and O2 sats at 90%. Pt is making slow progress toward meeting goals and needs to spend more time working to get out of bed into the chair and amb short distances. Pt and his family do not want to return to SNF but feel this would be the most appropriate option at this point due to his deficits.       Other factors to consider for discharge: impaired mobility, decreased endurance, dropping O2 sats     Patient will benefit from skilled therapy intervention to address the above noted impairments. PLAN :  Recommendations and Planned Interventions: bed mobility training, transfer training, gait training, therapeutic exercises, patient and family training/education, and therapeutic activities      Frequency/Duration: Patient will be followed by physical therapy:  5 times a week to address goals. Recommendation for discharge: (in order for the patient to meet his/her long term goals)  SNF; however, pt and family requesting for pt to come home. If he goes home he will require 24/7 care and w/c, BSC and gait belt    This discharge recommendation:  Has been made in collaboration with the attending provider and/or case management    Equipment recommendations for successful discharge (if) home: gait belt, wheelchair, and BSC         SUBJECTIVE:   Patient stated \"I have been very constipated.     OBJECTIVE DATA SUMMARY:   HISTORY:    Past Medical History:   Diagnosis Date    Diabetes ([facility] [address]     Essential hypertension     Hyperlipidemia     Myocardial infarction [facility])     Pacemaker      Past Surgical History:   Procedure Laterality Date    HX CORONARY ARTERY BYPASS GRAFT      IA INSJ/RPLCMT PERM DFB W/TRNSVNS LDS 1/DUAL CHMBR N/A 5/17/2019    pm-> ICD upgrade, RV lead, [employer] performed by Ayad Weinstein MD at [address] course since last seen and reason for reevaluation: has been 7 days since evaluation    Personal factors and/or comorbidities impacting plan of care: impaired mobility, decreased endurance, fall precautions    Home Situation  Home Environment: [address] [place] [address] Name: [facility]  # Steps to Enter: 0  One/Two Story Residence: One story  Living Alone: No  Support Systems: Family member(s)(Daugher and granddaughter)  Patient Expects to be Discharged to[de-identified] Skilled nursing facility  Current DME Used/Available at Home: [employer], rollator, Other (comment)(sock aide)    EXAMINATION/PRESENTATION/DECISION MAKING:   Critical Behavior:  Neurologic State: Alert  Orientation Level: Oriented to person, Oriented to place  Cognition: Appropriate decision making     Range Of Motion:      [facility] except B knee flex min restricted                    Functional Mobility:  Bed Mobility:  Rolling: Minimum assistance;Assist x1  Supine to Sit: Minimum assistance;Assist x1     Scooting: Moderate assistance;Assist x1  Transfers:  Sit to Stand: Moderate assistance;Assist x2  Stand to Sit: Moderate assistance;Assist x2        Bed to Chair: Maximum assistance;Assist x2  Pt transferred to bedside commode and while on commode, pt's bed linens were changed. Balance:   Sitting: Intact; Without support  Standing: Impaired; With support  Ambulation/Gait Training:  Distance (ft): 3 Feet (ft)     Ambulation - Level of Assistance: Moderate assistance;Assist x2        Gait Abnormalities: Shuffling gait              Speed/Zulma: Slow;Shuffled  Step Length: Right shortened;Left shortened    Pt amb from bed to [facility] and then [facility]. Therapeutic Exercises:   Heel slides, hip abd/add, ankle pumps in semi-supine and LAQ x 10 each. Pain Ratin/10    Activity Tolerance:   Fair, desaturates with exertion and requires oxygen, and requires rest breaks  Please refer to the flowsheet for vital signs taken during this treatment. After treatment patient left in no apparent distress:   Sitting in chair and Call bell within reach    COMMUNICATION/EDUCATION:   The patients plan of care was discussed with: Occupational therapist and Registered nurse. Patient/family agree to work toward stated goals and plan of care. PT/OT sessions occurred together for increased safety of pt and clinician.      Thank you for this referral.  Emerald Alvarado   Time Calculation: 33 mins

## 2020-10-06 NOTE — PROGRESS NOTES
Progress Note    Patient: Isabel Flores MRN: [id number]  SSN: [id number]    YOB: 1940  Age: 79 y.o. Sex: male      Admit Date: 9/22/2020    LOS: 13 days     Subjective:     80F, H/o AECHFr and pEF with AICD, PAD, CAD s/p stent with septic shock s.t UTI/ e faecalis on levaquin, sepsis/aechf resolved. Bullous pressure lesion L lower left s/p I&D 9/24. Doing well today still on O2 has not ambulated yet. No  chest pain  Today. No N/V/D per patient  Chr SOB        Objective:     Vitals:    10/06/20 0023 10/06/20 0159 10/06/20 0400 10/06/20 0726   BP: 110/70 (!) 105/47 (!) 99/48 (!) 113/50   Pulse: 71 75 66 72   Resp: 18 17 20 20   Temp: 97.5 °F (36.4 °C) 98 °F (36.7 °C) 98.5 °F (36.9 °C) 98.3 °F (36.8 °C)   SpO2: 99% 97% 98% 97%   Weight:       Height:              Physical Exam:   General : chr ill looking no respiratory distress noted. Lungs : normal breath sounds.  No wheezing, no rales.  nonlabored resp.   CVS : Rhythm rate regular, S1+, S2+, no murmur or gallop  Abdomen : Soft, nontender,  Extremities : No edema noted,  Skin : Moist, warm,  Neurological : Awake, alert, oriented to time place person.  No neurological deficits.    Psychiatric : Mood normal    Lab/Data Review:  Recent Results (from the past 24 hour(s))   GLUCOSE, POC    Collection Time: 10/05/20 11:04 AM   Result Value Ref Range    Glucose (POC) 149 (H) 65 - 100 mg/dL    Performed by Dez Caplori    GLUCOSE, POC    Collection Time: 10/05/20  3:57 PM   Result Value Ref Range    Glucose (POC) 92 65 - 100 mg/dL    Performed by Dez Caprice    TROPONIN I    Collection Time: 10/05/20  4:55 PM   Result Value Ref Range    Troponin-I, Qt. <0.05 <0.05 ng/mL   GLUCOSE, POC    Collection Time: 10/05/20 10:19 PM   Result Value Ref Range    Glucose (POC) 74 65 - 100 mg/dL    Performed by Rip Guzman    GLUCOSE, POC    Collection Time: 10/06/20  7:33 AM   Result Value Ref Range    Glucose (POC) 106 (H) 65 - 100 mg/dL    Performed by Marylene Blamer          Assessment and plan:      Shock: likely cardiogenic and septic. Levophed and dobutamine. Resolved. Off pressor/dobutamine, s/t  E. Faecalis UTI. levaquin 750 daily from 9/27- d10/10. Has been back on dopamine 2/2 low bp, cardio following, dopamine stopped as bp appears adequate. Holding bp rx    Chest pain, trop remains neg. Cont per cardio     UTI: ucs=E Faecalis, continue levaquin d10/10     AECHFr and pEF, cxr with persistent intersitial edema small effusions. Cont lasix. metoprolol/lisinopril held 2/2 low bp.     PAD: atorvastatin, Plavix, , ezetimibe     CYNDI on CKDIII: cardiorenal potential ATN given the hypotension. Resolved cyndi, cr to 1.12    DMII :lantus 10 and SSI. improved trend, resume januvia, titrate lantus,     CAD s/p stent/CABG : plavix.       Hypotension: holding metop/lisinopril. Dopamine stopped  follow closely bp . Continue midodrine. Anorexia: states started about 1 week prior to return. Appetite remains poor. Ccontinue  marinol- discussed and he is agreable. Ranexa initiated last admit? Stopped 2/2 hypotension nonetheless. May contribute to anorexia. AstheniaGait impairment: unable to ambulate safely, participate in adl/iadl and cannot safely maneuver walker or cane. He appears cognizant and safe to use wheelchair but weakness would limit use of standard wheelchair. Wc would improve pts participation and would assist caregivers in [facility]. Declines rehab, will need lightweight wheelchair for mobility/safety. Pt states ambulatory prior to admit 8/2020, [place] x 32 days then back inpatient. States progressed to wheelchair at facility but weaker now and not sure can return home like this now. He does not want to [facility] to [facility]. Family wants him home not snf. DME needs arranged. Will need to reassess for home O2 prior to dc.    His weakness appears certainly to have progressed again with prolonged hospitalization.        DVT ppx: heparin  DISPO: Pending course, snf rec'd- family declines snf. Home with hhc services when stable and arranged WC with home O2 prior to dc.      Signed By: Cleda Gowers, MD     October 6, 2020

## 2020-10-06 NOTE — PROGRESS NOTES
Bedside shift change report given to Rj Prakash RN (oncoming nurse) by Donaldo Ricci RN (offgoing nurse). Report included the following information SBAR.

## 2020-10-06 NOTE — PROGRESS NOTES
Problem: Self Care Deficits Care Plan (Adult)  Goal: *Acute Goals and Plan of Care (Insert Text)  Description: Pt will be SBA sup <> sit in prep for EOB ADLs  Pt will be Mod I grooming sitting EOB  Pt will be Mod I LE dressing sitting EOB/long sit  Pt will be SBA sit <>  prep for toileting LRAD  Pt will be SBA toileting/toilet transfer/cloth mgmt LRAD  Pt will be IND following UE HEP in prep for self care tasks     Outcome: Progressing Towards Goal     OCCUPATIONAL THERAPY RE-EVALUATION  Patient: Vandana Age (16 y.o. male)  Date: 10/6/2020  Diagnosis: Heart failure ([facility] [address] [I50.9]  Acute exacerbation of CHF (congestive heart failure) ([facility]) [I50.9]  Adult failure to thrive [R62.7]  Nausea & vomiting [R11.2]  Hypotension [I95.9]   <principal problem not specified>       Precautions:  Fall Precautions  Chart, occupational therapy assessment, plan of care, and goals were reviewed. ASSESSMENT  Pt received sitting EOB with PT agreeable for OT tx. Based on the objective data described below, pt presents with decreased balance, decreased activity tolerance, decreased safety awareness, generalized weakness and increased need for assist with self care and functional transfers/mobility. PT making slow progress towards goals and remains on 2L O2 via nasal canula (baseline on room air). Pt requiring mod Ax2 for bed to [facility] transfer today and requiring v/c for safe hand placement and to keep turning before attempting to sit down. Pt would benefit from skilled OT services while at [facility] in order to increase safety and independence with self care and functional transfers/mobility. Recommend discharge to SNF when medically appropriate however per chart review pt wanting to go home in which case pt will require 24/7 care, WC, BSC.        Other factors to consider for discharge: activity tolerance, time since onset, severity of deficits         PLAN :  Recommendations and Planned Interventions: self care training, functional mobility training, therapeutic exercise, balance training, therapeutic activities, endurance activities, patient education, and home safety training    Frequency/Duration: Patient will be followed by occupational therapy 5 times a week to address goals. Recommend next OT session: 2 person assist for OOB activities    Recommendation for discharge: (in order for the patient to meet his/her long term goals)  SNF    This discharge recommendation:  Has been made in collaboration with the attending provider and/or case management    Equipment recommendations for successful discharge (if) home: bedside commode, wheelchair, and 24/7 supervision/assist and HHOT       SUBJECTIVE:   Patient stated I really need to sit while standing for max Ax2 toileting task    OBJECTIVE DATA SUMMARY:     Cognitive/Behavioral Status:  Neurologic State: Alert  Orientation Level: Oriented X4  Cognition: Appropriate decision making     Hearing: Auditory  Auditory Impairment: None    Range of Motion:  AROM: Generally decreased, functional    Strength:  Strength: Generally decreased, functional     RUE Strength  Observation: grossly observed to be 3+/5     LUE Strength  Observation: grossly observed to be 3+/5    Functional Mobility and Transfers for ADLs:  Bed Mobility:  Rolling: Minimum assistance;Assist x1  Supine to Sit: Minimum assistance;Assist x1  Scooting: Moderate assistance;Assist x1    Transfers:  Sit to Stand: Moderate assistance;Assist x2  Functional Transfers  Toilet Transfer : Maximum assistance;Assist x2(BSC transfer)  Adaptive Equipment: Bedside commode  Bed to Chair: Maximum assistance;Assist x2    Balance:  Sitting: Intact; Without support  Standing: Impaired; With support    ADL Assessment:     Oral Facial Hygiene/Grooming: Stand-by assistance;Setup    Lower Body Dressing: Stand-by assistance(with sock aid)    Toileting: Maximum assistance;Assist x2     ADL Intervention and task modifications:     Lower Body Dressing Assistance  Socks: Stand-by assistance  Position Performed: Seated edge of bed  Adaptive Equipment Used: Sock aid    Toileting  Toileting Assistance: Maximum assistance  Bowel Hygiene: Maximum assistance  Clothing Management: Maximum assistance    Pain:  6/10 lower back pain    Activity Tolerance:   Fair, requires frequent rest breaks, and observed SOB with activity  Please refer to the flowsheet for vital signs taken during this treatment. After treatment patient left in no apparent distress:   Sitting in chair and Call bell within reach    COMMUNICATION/COLLABORATION:   The patients plan of care was discussed with: Physical therapist and Registered nurse. PT/OT sessions occurred together for increased safety of pt and clinician.        Leana Sensor  Time Calculation: 23 mins

## 2020-10-06 NOTE — PROGRESS NOTES
Two RN Skin Assessment  Patient has a stage I  Pressure ulcer on sacrum. Mepilex was placed. Patient also has blister like wound on left lower leg with dressing intact. No other skin breakdown noted at this time.

## 2020-10-07 ENCOUNTER — APPOINTMENT (OUTPATIENT)
Dept: GENERAL RADIOLOGY | Age: 80
DRG: 871 | End: 2020-10-07
Attending: HOSPITALIST
Payer: MEDICARE

## 2020-10-07 LAB
GLUCOSE BLD STRIP.AUTO-MCNC: 112 MG/DL (ref 65–100)
GLUCOSE BLD STRIP.AUTO-MCNC: 120 MG/DL (ref 65–100)
GLUCOSE BLD STRIP.AUTO-MCNC: 121 MG/DL (ref 65–100)
GLUCOSE BLD STRIP.AUTO-MCNC: 160 MG/DL (ref 65–100)
PERFORMED BY, TECHID: ABNORMAL

## 2020-10-07 PROCEDURE — 74011250636 HC RX REV CODE- 250/636: Performed by: HOSPITALIST

## 2020-10-07 PROCEDURE — 74011250637 HC RX REV CODE- 250/637: Performed by: INTERNAL MEDICINE

## 2020-10-07 PROCEDURE — 94760 N-INVAS EAR/PLS OXIMETRY 1: CPT

## 2020-10-07 PROCEDURE — 74011636637 HC RX REV CODE- 636/637: Performed by: INTERNAL MEDICINE

## 2020-10-07 PROCEDURE — 74011250637 HC RX REV CODE- 250/637: Performed by: HOSPITALIST

## 2020-10-07 PROCEDURE — 74019 RADEX ABDOMEN 2 VIEWS: CPT

## 2020-10-07 PROCEDURE — 82962 GLUCOSE BLOOD TEST: CPT

## 2020-10-07 PROCEDURE — 65270000029 HC RM PRIVATE

## 2020-10-07 PROCEDURE — 77010033678 HC OXYGEN DAILY

## 2020-10-07 RX ORDER — POLYETHYLENE GLYCOL 3350 17 G/17G
17 POWDER, FOR SOLUTION ORAL
Status: DISCONTINUED | OUTPATIENT
Start: 2020-10-07 | End: 2020-10-10

## 2020-10-07 RX ADMIN — LEVOFLOXACIN 750 MG: 5 INJECTION, SOLUTION INTRAVENOUS at 17:46

## 2020-10-07 RX ADMIN — CLOPIDOGREL BISULFATE 75 MG: 75 TABLET ORAL at 09:54

## 2020-10-07 RX ADMIN — HEPARIN SODIUM 5000 UNITS: 10000 INJECTION, SOLUTION INTRAVENOUS; SUBCUTANEOUS at 17:47

## 2020-10-07 RX ADMIN — DOCUSATE SODIUM 100 MG: 100 CAPSULE, LIQUID FILLED ORAL at 09:54

## 2020-10-07 RX ADMIN — POTASSIUM CHLORIDE 20 MEQ: 1500 TABLET, EXTENDED RELEASE ORAL at 09:54

## 2020-10-07 RX ADMIN — DRONABINOL 5 MG: 2.5 CAPSULE ORAL at 17:46

## 2020-10-07 RX ADMIN — ATORVASTATIN CALCIUM 40 MG: 40 TABLET, FILM COATED ORAL at 21:42

## 2020-10-07 RX ADMIN — RENO CAPS 1 CAPSULE: 100; 1.5; 1.7; 20; 10; 1; 150; 5; 6 CAPSULE ORAL at 09:54

## 2020-10-07 RX ADMIN — Medication 1 TABLET: at 09:55

## 2020-10-07 RX ADMIN — HEPARIN SODIUM 5000 UNITS: 10000 INJECTION, SOLUTION INTRAVENOUS; SUBCUTANEOUS at 21:43

## 2020-10-07 RX ADMIN — FUROSEMIDE 40 MG: 40 TABLET ORAL at 09:54

## 2020-10-07 RX ADMIN — SITAGLIPTIN 50 MG: 100 TABLET, FILM COATED ORAL at 09:55

## 2020-10-07 RX ADMIN — SODIUM PHOSPHATE 1 ENEMA: 7; 19 ENEMA RECTAL at 09:54

## 2020-10-07 RX ADMIN — INSULIN GLARGINE 12 UNITS: 100 INJECTION, SOLUTION SUBCUTANEOUS at 21:43

## 2020-10-07 RX ADMIN — LEVOTHYROXINE SODIUM 88 MCG: 88 TABLET ORAL at 07:27

## 2020-10-07 RX ADMIN — MULTIVITAMIN TABLET 1 TABLET: TABLET at 09:54

## 2020-10-07 RX ADMIN — HEPARIN SODIUM 5000 UNITS: 10000 INJECTION, SOLUTION INTRAVENOUS; SUBCUTANEOUS at 07:26

## 2020-10-07 RX ADMIN — MIDODRINE HYDROCHLORIDE 10 MG: 5 TABLET ORAL at 17:46

## 2020-10-07 NOTE — PROGRESS NOTES
Progress Note    Patient: Mathew Donis MRN: [id number]  SSN: [id number]    YOB: 1940  Age: 79 y.o. Sex: male      Admit Date: 9/22/2020    LOS: 14 days     Subjective:   No acute events overnight. Denied having any chest pain or shortness of breath. He only count complaining of constipation. He wants enema      Objective:     Vitals:    10/06/20 1436 10/06/20 1933 10/06/20 2354 10/07/20 0358   BP: (!) 116/51 (!) 127/54 (!) 116/50 (!) 120/54   Pulse: 71 66 66 69   Resp: 20 20 18 18   Temp: 97.7 °F (36.5 °C) 98.4 °F (36.9 °C) 98.2 °F (36.8 °C) 98 °F (36.7 °C)   SpO2: 97%  95% 96%   Weight:       Height:            Intake and Output:  Current Shift: No intake/output data recorded. Last three shifts: 10/05 1901 - 10/07 0700  In: 80 [P.O.:580]  Out: -     Physical Exam:   General:  Alert, cooperative, no distress, appears stated age. Eyes:  Conjunctivae/corneas clear. PERRL, EOMs intact. Fundi benign   Ears:  Normal TMs and external ear canals both ears. Nose: Nares normal. Septum midline. Mucosa normal. No drainage or sinus tenderness. Mouth/Throat: Lips, mucosa, and tongue normal. Teeth and gums normal.   Neck: Supple, symmetrical, trachea midline, no adenopathy, thyroid: no enlargment/tenderness/nodules, no carotid bruit and no JVD. Back:   Symmetric, no curvature. ROM normal. No CVA tenderness. Lungs:   Clear to auscultation bilaterally. Heart:  Regular rate and rhythm, S1, S2 normal, no murmur, click, rub or gallop. Abdomen:   Soft, non-tender. Bowel sounds normal. No masses,  No organomegaly. Extremities: Extremities normal, atraumatic, no cyanosis or edema. Pulses: 2+ and symmetric all extremities. Skin: Skin color, texture, turgor normal. No rashes or lesions   Lymph nodes: Cervical, supraclavicular, and axillary nodes normal.   Neurologic: CNII-XII intact. Normal strength, sensation and reflexes throughout. Lab/Data Review:   All lab results for the last 24 hours reviewed. Assessment:     Active Problems:    Heart failure ([facility] [address] (9/23/2020)      Adult failure to thrive (9/23/2020)      Nausea & vomiting (9/23/2020)      Hypotension (9/23/2020)      Acute exacerbation of CHF (congestive heart failure) ([facility] [address] (9/23/2020)    Mr. Rod Owens is an 48-year-old -American male with:   1. 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 [employer] September 2017   2. Chest pain atypical for angina   3. Coronary artery disease (previous MI and status post coronary CABG)   4. Hypertension with hypertensive heart disease   5. Mixed Dyslipidemia      6.  Peripheral vessel disease   7. Type 2 diabetes mellitus   8. Former tobacco use   9. History of thyroid disease   10. Moderate pulmonary hypertension, RVSP 57 mmHg   11. History of bleeding per rectum and moderately severe diverticulosis of the descending colon and sigmoid colon. Plan:   He complains of abdominal distention and constipation. This is been resolved before with enema. He would like to try that again. I prescribed that. Blood pressure appears to be stable. Continue with Midrin and Plavix. No further cardiac testing planned at this time.         Signed By: Morgan Skelton MD     October 7, 2020

## 2020-10-07 NOTE — PROGRESS NOTES
Patient spo2 on 2lpm at rest 98%  Spo2 on room air at rest 90%  Patient back on 1lpm spo2 94%. Patient states he is unable to ambulate at this time, chidi repeat tomorrow.

## 2020-10-07 NOTE — PROGRESS NOTES
Progress Note    Patient: Rosaura Avendaño MRN: [id number]  SSN: [id number]    YOB: 1940  Age: 79 y.o. Sex: male      Admit Date: 9/22/2020    LOS: 14 days     Subjective:     80F, H/o AECHFr and pEF with AICD, PAD, CAD s/p stent with septic shock s.t UTI/ e faecalis on levaquin, sepsis/aechf resolved. Bullous pressure lesion L lower left s/p I&D 9/24. Patient complaining of abdominal discomfort and severe constipation, he supposed to get an enema today. Patient feels he is not ready to go home today. Otherwise denies any chest pain, cough, shortness of breath, nausea vomiting to me. He claims he has not passing gas yet. Objective:     Vitals:    10/06/20 2354 10/07/20 0358 10/07/20 0852 10/07/20 0910   BP: (!) 116/50 (!) 120/54     Pulse: 66 69     Resp: 18 18     Temp: 98.2 °F (36.8 °C) 98 °F (36.7 °C)     SpO2: 95% 96% 96% 93%   Weight:       Height:              Physical Exam:   General : chr ill looking no respiratory distress noted. Lungs : normal breath sounds.  No wheezing, no rales.  nonlabored resp.   CVS : Rhythm rate regular, S1+, S2+, no murmur or gallop  Abdomen : Soft, distended miidly tender  Extremities : No edema noted,  Skin : Moist, warm,  Neurological : Awake, alert, oriented to time place person.  No neurological deficits.    Psychiatric : Mood normal    Lab/Data Review:  Recent Results (from the past 24 hour(s))   GLUCOSE, POC    Collection Time: 10/06/20 11:00 AM   Result Value Ref Range    Glucose (POC) 187 (H) 65 - 100 mg/dL    Performed by Shahbaz Hogue    METABOLIC PANEL, BASIC    Collection Time: 10/06/20  3:41 PM   Result Value Ref Range    Sodium 134 (L) 136 - 145 mmol/L    Potassium 4.6 3.5 - 5.1 mmol/L    Chloride 99 97 - 108 mmol/L    CO2 27 21 - 32 mmol/L    Anion gap 8 5 - 15 mmol/L    Glucose 220 (H) 65 - 100 mg/dL    BUN 34 (H) 6 - 20 mg/dL    Creatinine 1.27 0.70 - 1.30 mg/dL    BUN/Creatinine ratio 27 (H) 12 - 20      GFR est AA >60 >60 ml/min/1.73m2    GFR est non-AA 54 (L) >60 ml/min/1.73m2    Calcium 8.8 8.5 - 10.1 mg/dL   CBC WITH AUTOMATED DIFF    Collection Time: 10/06/20  3:41 PM   Result Value Ref Range    WBC 13.2 (H) 4.1 - 11.1 K/uL    RBC 3.30 (L) 4.10 - 5.70 M/uL    HGB 8.8 (L) 12.1 - 17.0 g/dL    HCT 26.3 (L) 36.6 - 50.3 %    MCV 79.7 (L) 80.0 - 99.0 FL    MCH 26.7 26.0 - 34.0 PG    MCHC 33.5 30.0 - 36.5 g/dL    RDW 16.6 (H) 11.5 - 14.5 %    PLATELET 104 (H) 001 - 400 K/uL    MPV 10.1 8.9 - 12.9 FL    NRBC 0.0 0  WBC    ABSOLUTE NRBC 0.00 0.00 - 0.01 K/uL    NEUTROPHILS 86 (H) 32 - 75 %    LYMPHOCYTES 8 (L) 12 - 49 %    MONOCYTES 6 5 - 13 %    EOSINOPHILS 0 0 - 7 %    BASOPHILS 0 0 - 1 %    IMMATURE GRANULOCYTES 0 %    ABS. NEUTROPHILS 11.3 (H) 1.8 - 8.0 K/UL    ABS. LYMPHOCYTES 1.1 0.8 - 3.5 K/UL    ABS. MONOCYTES 0.8 0.0 - 1.0 K/UL    ABS. EOSINOPHILS 0.0 0.0 - 0.4 K/UL    ABS. BASOPHILS 0.0 0.0 - 0.1 K/UL    ABS. IMM. GRANS. 0.0 K/UL    DF Manual      RBC COMMENTS Anisocytosis  1+       GLUCOSE, POC    Collection Time: 10/06/20  3:57 PM   Result Value Ref Range    Glucose (POC) 230 (H) 65 - 100 mg/dL    Performed by Katarina Mccabe    GLUCOSE, POC    Collection Time: 10/06/20 11:29 PM   Result Value Ref Range    Glucose (POC) 129 (H) 65 - 100 mg/dL    Performed by Elana Bernardo, POC    Collection Time: 10/07/20  8:30 AM   Result Value Ref Range    Glucose (POC) 121 (H) 65 - 100 mg/dL    Performed by JERMAINE IRAHETA          Assessment and plan:      Shock: likely cardiogenic and septic. Levophed and dobutamine. Resolved. Off pressor/dobutamine, s/t  E. Faecalis UTI. levaquin 750 daily from 9/27- d10/10. Has been back on dopamine 2/2 low bp, cardio following, dopamine stopped as bp appears adequate. Holding bp rx    Abd distention/pain 10/7- suspect constipation agree with enema, add stool softeners, check KUB    Chest pain, trop remains neg.  Cont per cardio no further intervention     UTI: ucs=E Faecalis, completed levaquin d10/10     AECHFr and pEF, cxr with persistent intersitial edema small effusions. Cont lasix. metoprolol/lisinopril held 2/2 low bp.     PAD: atorvastatin, Plavix, , ezetimibe     CYNDI on CKDIII: cardiorenal potential ATN given the hypotension. Resolved cyndi, cr to 1.12    DMII :lantus 10 and SSI. improved trend, resume januvia, titrate lantus,     CAD s/p stent/CABG : plavix.       Hypotension: holding metop/lisinopril. Dopamine stopped  follow closely bp . Continue midodrine. Anorexia: states started about 1 week prior to return. Appetite remains poor. Ccontinue  marinol- discussed and he is agreable. Ranexa initiated last admit? Stopped 2/2 hypotension nonetheless. May contribute to anorexia. AstheniaGait impairment: unable to ambulate safely, participate in adl/iadl and cannot safely maneuver walker or cane. He appears cognizant and safe to use wheelchair but weakness would limit use of standard wheelchair. Wc would improve pts participation and would assist caregivers in [facility]. Declines rehab, will need lightweight wheelchair for mobility/safety. Pt states ambulatory prior to admit 8/2020, [place] x 32 days then back inpatient. States progressed to wheelchair at facility but weaker now and not sure can return home like this now. He does not want to return to [facility]. Family wants him home not snf. DME needs arranged. Will need to reassess for home O2 prior to dc. His weakness appears certainly to have progressed again with prolonged hospitalization.        DVT ppx: heparin  DISPO: Pending resolving of abd discomfort issues, snf rec'd- family declines snf. Home with [facility] services when stable and arranged WC with home O2 prior to dc.      Signed By: Nima Pena MD     October 7, 2020

## 2020-10-07 NOTE — PROGRESS NOTES
CM has faxed updates to [facility] via [employer]. CM notified attending that patient would require new resp test and order prior to d/c. Current DCP is for patient to return home with family care and HH. Wheelchair has been ordered for patient, patient has hospital bed and walker at home as well as 24/7 family care. CM continues to follow.

## 2020-10-08 LAB
ANION GAP SERPL CALC-SCNC: 9 MMOL/L (ref 5–15)
BASOPHILS # BLD: 0 K/UL (ref 0–0.1)
BASOPHILS NFR BLD: 0 % (ref 0–1)
BLASTS NFR BLD MANUAL: 1 %
BUN SERPL-MCNC: 23 MG/DL (ref 6–20)
BUN/CREAT SERPL: 20 (ref 12–20)
CA-I BLD-MCNC: 9.3 MG/DL (ref 8.5–10.1)
CHLORIDE SERPL-SCNC: 103 MMOL/L (ref 97–108)
CO2 SERPL-SCNC: 24 MMOL/L (ref 21–32)
CREAT SERPL-MCNC: 1.15 MG/DL (ref 0.7–1.3)
DIFFERENTIAL METHOD BLD: ABNORMAL
EOSINOPHIL # BLD: 0 K/UL (ref 0–0.4)
EOSINOPHIL NFR BLD: 0 % (ref 0–7)
ERYTHROCYTE [DISTWIDTH] IN BLOOD BY AUTOMATED COUNT: 16.8 % (ref 11.5–14.5)
GLUCOSE BLD STRIP.AUTO-MCNC: 117 MG/DL (ref 65–100)
GLUCOSE BLD STRIP.AUTO-MCNC: 129 MG/DL (ref 65–100)
GLUCOSE BLD STRIP.AUTO-MCNC: 225 MG/DL (ref 65–100)
GLUCOSE BLD STRIP.AUTO-MCNC: 94 MG/DL (ref 65–100)
GLUCOSE SERPL-MCNC: 128 MG/DL (ref 65–100)
HCT VFR BLD AUTO: 29.5 % (ref 36.6–50.3)
HGB BLD-MCNC: 9.6 G/DL (ref 12.1–17)
IMM GRANULOCYTES # BLD AUTO: 0 K/UL
IMM GRANULOCYTES NFR BLD AUTO: 0 %
LACTATE SERPL-SCNC: 1.8 MMOL/L (ref 0.4–2)
LYMPHOCYTES # BLD: 0.5 K/UL (ref 0.8–3.5)
LYMPHOCYTES NFR BLD: 5 % (ref 12–49)
MCH RBC QN AUTO: 27 PG (ref 26–34)
MCHC RBC AUTO-ENTMCNC: 32.5 G/DL (ref 30–36.5)
MCV RBC AUTO: 82.9 FL (ref 80–99)
MONOCYTES # BLD: 0.4 K/UL (ref 0–1)
MONOCYTES NFR BLD: 4 % (ref 5–13)
MYELOCYTES NFR BLD MANUAL: 3 %
NEUTS BAND NFR BLD MANUAL: 1 % (ref 0–6)
NEUTS SEG # BLD: 9.4 K/UL (ref 1.8–8)
NEUTS SEG NFR BLD: 86 % (ref 32–75)
NRBC # BLD: 0 K/UL (ref 0–0.01)
NRBC BLD-RTO: 0 PER 100 WBC
PERFORMED BY, TECHID: ABNORMAL
PERFORMED BY, TECHID: NORMAL
PLATELET # BLD AUTO: 608 K/UL (ref 150–400)
PLATELET COMMENTS,PCOM: ABNORMAL
PMV BLD AUTO: 9.8 FL (ref 8.9–12.9)
POTASSIUM SERPL-SCNC: 4.4 MMOL/L (ref 3.5–5.1)
RBC # BLD AUTO: 3.56 M/UL (ref 4.1–5.7)
RBC MORPH BLD: ABNORMAL
SODIUM SERPL-SCNC: 136 MMOL/L (ref 136–145)
WBC # BLD AUTO: 10.8 K/UL (ref 4.1–11.1)

## 2020-10-08 PROCEDURE — 74011250637 HC RX REV CODE- 250/637: Performed by: HOSPITALIST

## 2020-10-08 PROCEDURE — 74011636637 HC RX REV CODE- 636/637: Performed by: INTERNAL MEDICINE

## 2020-10-08 PROCEDURE — 94760 N-INVAS EAR/PLS OXIMETRY 1: CPT

## 2020-10-08 PROCEDURE — 74011250636 HC RX REV CODE- 250/636: Performed by: HOSPITALIST

## 2020-10-08 PROCEDURE — 74011636637 HC RX REV CODE- 636/637: Performed by: HOSPITALIST

## 2020-10-08 PROCEDURE — 74011250637 HC RX REV CODE- 250/637: Performed by: INTERNAL MEDICINE

## 2020-10-08 PROCEDURE — 36415 COLL VENOUS BLD VENIPUNCTURE: CPT

## 2020-10-08 PROCEDURE — 97530 THERAPEUTIC ACTIVITIES: CPT

## 2020-10-08 PROCEDURE — 74011250637 HC RX REV CODE- 250/637: Performed by: PHYSICIAN ASSISTANT

## 2020-10-08 PROCEDURE — 85025 COMPLETE CBC W/AUTO DIFF WBC: CPT

## 2020-10-08 PROCEDURE — 65270000029 HC RM PRIVATE

## 2020-10-08 PROCEDURE — 77010033678 HC OXYGEN DAILY

## 2020-10-08 PROCEDURE — 83605 ASSAY OF LACTIC ACID: CPT

## 2020-10-08 PROCEDURE — 80048 BASIC METABOLIC PNL TOTAL CA: CPT

## 2020-10-08 PROCEDURE — 82962 GLUCOSE BLOOD TEST: CPT

## 2020-10-08 RX ADMIN — HEPARIN SODIUM 5000 UNITS: 10000 INJECTION, SOLUTION INTRAVENOUS; SUBCUTANEOUS at 05:56

## 2020-10-08 RX ADMIN — INSULIN LISPRO 3 UNITS: 100 INJECTION, SOLUTION INTRAVENOUS; SUBCUTANEOUS at 13:41

## 2020-10-08 RX ADMIN — SITAGLIPTIN 50 MG: 100 TABLET, FILM COATED ORAL at 08:32

## 2020-10-08 RX ADMIN — DOCUSATE SODIUM 100 MG: 100 CAPSULE, LIQUID FILLED ORAL at 08:32

## 2020-10-08 RX ADMIN — MIDODRINE HYDROCHLORIDE 10 MG: 5 TABLET ORAL at 18:53

## 2020-10-08 RX ADMIN — HEPARIN SODIUM 5000 UNITS: 10000 INJECTION, SOLUTION INTRAVENOUS; SUBCUTANEOUS at 13:42

## 2020-10-08 RX ADMIN — CLOPIDOGREL BISULFATE 75 MG: 75 TABLET ORAL at 08:31

## 2020-10-08 RX ADMIN — DRONABINOL 5 MG: 2.5 CAPSULE ORAL at 08:31

## 2020-10-08 RX ADMIN — POLYETHYLENE GLYCOL 3350 17 G: 17 POWDER, FOR SOLUTION ORAL at 18:53

## 2020-10-08 RX ADMIN — LEVOTHYROXINE SODIUM 88 MCG: 88 TABLET ORAL at 05:56

## 2020-10-08 RX ADMIN — MIDODRINE HYDROCHLORIDE 10 MG: 5 TABLET ORAL at 08:31

## 2020-10-08 RX ADMIN — MIDODRINE HYDROCHLORIDE 10 MG: 5 TABLET ORAL at 13:42

## 2020-10-08 RX ADMIN — RENO CAPS 1 CAPSULE: 100; 1.5; 1.7; 20; 10; 1; 150; 5; 6 CAPSULE ORAL at 08:31

## 2020-10-08 RX ADMIN — Medication 1 TABLET: at 08:32

## 2020-10-08 RX ADMIN — ATORVASTATIN CALCIUM 40 MG: 40 TABLET, FILM COATED ORAL at 22:48

## 2020-10-08 RX ADMIN — HEPARIN SODIUM 5000 UNITS: 10000 INJECTION, SOLUTION INTRAVENOUS; SUBCUTANEOUS at 22:48

## 2020-10-08 RX ADMIN — FERROUS SULFATE TAB 325 MG (65 MG ELEMENTAL FE) 325 MG: 325 (65 FE) TAB at 08:31

## 2020-10-08 RX ADMIN — OXYCODONE HYDROCHLORIDE 5 MG: 5 TABLET ORAL at 10:35

## 2020-10-08 RX ADMIN — MULTIVITAMIN TABLET 1 TABLET: TABLET at 08:32

## 2020-10-08 RX ADMIN — INSULIN GLARGINE 12 UNITS: 100 INJECTION, SOLUTION SUBCUTANEOUS at 22:48

## 2020-10-08 RX ADMIN — PANTOPRAZOLE SODIUM 40 MG: 40 TABLET, DELAYED RELEASE ORAL at 08:38

## 2020-10-08 RX ADMIN — OXYCODONE HYDROCHLORIDE 5 MG: 5 TABLET ORAL at 18:53

## 2020-10-08 RX ADMIN — EZETIMIBE 10 MG: 10 TABLET ORAL at 08:32

## 2020-10-08 RX ADMIN — POTASSIUM CHLORIDE 20 MEQ: 1500 TABLET, EXTENDED RELEASE ORAL at 08:32

## 2020-10-08 NOTE — DISCHARGE SUMMARY
Hospitalist Discharge Summary     Patient ID:  Lucian Boss  [id number]  8 y.o.  1940 9/22/2020    PCP on record: Corbin Guillen MD    Admit date: 9/22/2020  Discharge date and time: 10/8/2020    DISCHARGE DIAGNOSIS:  _   Ischemic cardiomyopathy          Codes: ICD-10-CM: I25.5   ICD-9-CM: 132. 8                    Left bundle branch block           Codes: ICD-10-CM: I44.7   ICD-9-CM: 856. 3                    Cardiomyopathy ([facility]           Codes: ICD-10-CM: I42.9   ICD-9-CM: 870. 4          Priority: 1 - One                    Heart failure (HCC)           Codes: ICD-10-CM: I50.9   ICD-9-CM: 044. 9                    Adult failure to thrive           Codes: ICD-10-CM: R62.7   ICD-9-CM: 054. 7                    Nausea & vomiting           Codes: ICD-10-CM: R11.2   ICD-9-CM: 787.01                    Hypotension           Codes: ICD-10-CM: I95.9   ICD-9-CM: 809. 9                    Acute exacerbation of CHF (congestive heart failure) ([facility]           Codes: ICD-10-CM: I50.9   ICD-9-CM: 428. 0                      CONSULTATIONS:  IP CONSULT TO GENERAL SURGERY  IP CONSULT TO CARDIOLOGY       ______________________________________________________________________  DISCHARGE SUMMARY/HOSPITAL COURSE:  for full details see H&P, daily progress notes, labs, consult notes. 79 y.o. male with a history of diabetes, ischemic cardiomyopathy, hypertension presents to the emergency room from [address] [place] [address] with complaining of increasing shortness of breath, blood on his lower extremity. He has a bullous formation on left lower extremity just above the ankle, it was very big filled with fluid. He denies any wounds on the back, admits the weakness is not improving. Fatigue is the main complaint. Was admitted for bleb fluid-filled on his left lower extremity: Looks like pressure wound with localized fluid collection.   surgery consulted for debridement, and also wound care team. Cardiology consulted for severe peripheral vascular disease with suspected pressure on the left lower extremity at this time. 2D echo showed normal EF 55 to 64% with diastolic heart failure my initial chest x-ray showed pulmonary edema patient was responded with IV Lasix  Chest ultrasound ultimately showed no DVT, respiratory therapy evaluate patient, patient desaturated below 87 while ambulating, family does not want the patient to go back to skilled nursing facility and will  take the patient home with home health home PT OT discharge condition stable _____________________________________________________________________  Patient seen and examined by me on discharge day. Pertinent Findings:  Gen:    Not in distress  Chest: Clear lungs  CVS:   Regular rhythm. No edema  Abd:  Soft, not distended, not tender  Neuro:  Alert, _______________________________________________________________________  DISCHARGE MEDICATIONS:   Current Discharge Medication List      CONTINUE these medications which have NOT CHANGED    Details   cyanocobalamin (VITAMIN B12) 1,000 mcg/mL injection INJECT 1 ML SUBCUTANEOUSLY ONCE MONTHLY  Refills: 5      nitroglycerin (NITROSTAT) 0.4 mg SL tablet 0.4 mg by SubLINGual route every five (5) minutes as needed for Chest Pain. Up to 3 doses. insulin detemir (LEVEMIR FLEXTOUCH U-100 INSULN SC) by SubCUTAneous route. Indications: as needed for BS > 150 once a day then take 10 units         STOP taking these medications       metoprolol succinate (TOPROL-XL) 25 mg XL tablet Comments:   Reason for Stopping:         lisinopril (PRINIVIL, ZESTRIL) 5 mg tablet Comments:   Reason for Stopping:                 Patient Follow Up Instructions: Activity: Activity as tolerated and See surgical instructions  Diet: Cardiac Diet  Wound Care: Keep wound clean and dry    Follow-up with  in 7 days.   Follow-up tests/labs   Follow-up Information     Follow up With Specialties Details Why Contact Info    Yessica Pieter Madrigal MD Internal Medicine   [address]  [phone]        In 1 week          ________________________________________________________________    Risk of deterioration moderate  Condition at Discharge:  Stable  __________________________________________________________________    Disposition  Home with family and home health services    ____________________________________________________________________    Code Status: Full Code  ___________________________________________________________________      Total time in minutes spent coordinating this discharge (includes going over instructions, follow-up, prescriptions, and preparing report for sign off to her PCP) :  30 minutes    Signed:  Katarina Stout MD

## 2020-10-08 NOTE — PROGRESS NOTES
Patient has been accepted with [facility], O2 order and supporting documents have been faxed to [facility] via [email or web address]. Patient is clear to d/c to home with [facility] once O2 has been delivered.

## 2020-10-08 NOTE — PROGRESS NOTES
Spoke with patients daughter whom patient lives with Eunice Lin [phone]. Daughter stated they are awaiting wheelchair and hospital bed to be delivered to the house. [address] with daughter, Marcelo Clemente [phone] - stated that her and her other sisters would not be able to care for patient if he is not ambulatory. Dr. Lenny Lyn paged. [address] [facility]  Dr. Lenny Lyn paged. [phone]   Wounds noted bilaterally due to [address] and mepilex applied. Linens changed, patient incontinent of stool and urine.

## 2020-10-08 NOTE — ROUTINE PROCESS
Patient unable to assist in transferring back to bed with 2 person assist. Sejal Mile lift needed to return patient to bed. Dressing changed performed to left lower medial extremity.

## 2020-10-08 NOTE — ROUTINE PROCESS
SpO2 94% on room air at rest.  SpO2 87% on room air moving from bed to chair. Replaced O2 1 lpm n.c. SpO2 95% on 1 lpmn n.c at rest and after doing bed exercises for 8-10 minutes.

## 2020-10-08 NOTE — ROUTINE PROCESS
Bedside and verbal shift change report given to RODRIGUEZ Alfredo RN (oncoming nurse) by [employer]). Report included the following information SBAR, Kardex, MAR and Recent Results.

## 2020-10-08 NOTE — PROGRESS NOTES
Patient requires a signed order for home O2 by attending in order for patient to d/c to home with [facility] and home O2, CM has paged attending, awaiting callback.

## 2020-10-08 NOTE — PROGRESS NOTES
Problem: Pressure Injury - Risk of  Goal: *Prevention of pressure injury  Description: Document John Scale and appropriate interventions in the flowsheet. Outcome: Progressing Towards Goal  Note: Pressure Injury Interventions:  Sensory Interventions: Float heels, Keep linens dry and wrinkle-free, Maintain/enhance activity level, Minimize linen layers, Turn and reposition approx. every two hours (pillows and wedges if needed)    Moisture Interventions: Absorbent underpads, Minimize layers, Offer toileting Q_hr, Check for incontinence Q2 hours and as needed    Activity Interventions: Increase time out of bed    Mobility Interventions: HOB 30 degrees or less, Turn and reposition approx. every two hours(pillow and wedges)    Nutrition Interventions: Offer support with meals,snacks and hydration    Friction and Shear Interventions: HOB 30 degrees or less, Lift team/patient mobility team, Transferring/repositioning devices, Apply protective barrier, creams and emollients, Feet elevated on foot rest                Problem: Patient Education: Go to Patient Education Activity  Goal: Patient/Family Education  Outcome: Progressing Towards Goal     Problem: Falls - Risk of  Goal: *Absence of Falls  Description: Document Kristi Fall Risk and appropriate interventions in the flowsheet.   Outcome: Progressing Towards Goal  Note: Fall Risk Interventions:  Mobility Interventions: Bed/chair exit alarm, Patient to call before getting OOB, Strengthening exercises (ROM-active/passive); zachariah lift    Mentation Interventions: Bed/chair exit alarm, Increase mobility, Toileting rounds, Update white board    Medication Interventions: Teach patient to arise slowly, Patient to call before getting OOB, Bed/chair exit alarm    Elimination Interventions: Call light in reach, Bed/chair exit alarm, Toilet paper/wipes in reach, Toileting schedule/hourly rounds, Patient to call for help with toileting needs, Urinal in reach              Problem: Patient Education: Go to Patient Education Activity  Goal: Patient/Family Education  Outcome: Progressing Towards Goal     Problem: Hypotension  Goal: *Blood pressure within specified parameters  Outcome: Progressing Towards Goal     Problem: Hypotension  Goal: *Fluid volume balance  Outcome: Progressing Towards Goal     Problem: Nutrition Deficit  Goal: *Optimize nutritional status  Outcome: Progressing Towards Goal     Problem: Non-Violent Restraints  Goal: *Removal from restraints as soon as assessed to be safe; pt not in restraints upon shift change at 0700  Outcome: Resolved/Met

## 2020-10-08 NOTE — PROGRESS NOTES
PHYSICAL THERAPY TREATMENT  Patient: Altaf Pena (79 y.o. male)  Date: 10/8/2020  Diagnosis: Heart failure ([facility] [I50.9]  Acute exacerbation of CHF (congestive heart failure) ([facility] [I50.9]  Adult failure to thrive [R62.7]  Nausea & vomiting [R11.2]  Hypotension [I95.9]   <principal problem not specified>       Precautions:    Chart, physical therapy assessment, plan of care and goals were reviewed. ASSESSMENT  Patient continues with skilled PT services and is slowly progressing towards goals. Pt semi supine in bed upon arrival and agreeable to session. Performed bed mobility with supervision, but c/o dizziness upon sitting that dissipated quickly. STS required mod A x 2. Pt performed bed>chair with mod A x 2 with SPT and RW. Patient attempted to sit prematurely and required max vc to continue performing transfer. Patient very SOB post transfer, O2 saturation on room air was 84%, respiratory therapist contacted and told therapist to place patient back on 1L via nasal canula. On 1L patient O2 increased to low 90's and RN notified of respiratory status. Pt educated on seated TE to perform throughout the day while seated in recliner. Current Level of Function Impacting Discharge (mobility/balance): level of assistance needed for mobility    Other factors to consider for discharge: time since onset, respiratory status, decreased activity tolerance         PLAN :  Patient continues to benefit from skilled intervention to address the above impairments. Continue treatment per established plan of care. to address goals.     Recommendation for discharge: (in order for the patient to meet his/her long term goals)  Therapy up to 5 days/week in SNF setting    This discharge recommendation:  Has been made in collaboration with the attending provider and/or case management    IF patient discharges home will need the following DME: to be determined (TBD)       SUBJECTIVE:   Patient stated my back really beverly.    OBJECTIVE DATA SUMMARY:   Critical Behavior:  Neurologic State: Alert  Orientation Level: Oriented X4  Cognition: Follows commands     Functional Mobility Training:  Bed Mobility:  Rolling: Supervision  Supine to Sit: Supervision  Scooting: Supervision    Transfers:  Sit to Stand: Moderate assistance;Assist x2  Stand to Sit: Moderate assistance;Assist x2  Bed to Chair: Moderate assistance;Assist x2    Balance:  Sitting: Intact; With support  Standing: Impaired; With support  Standing - Static: Fair  Standing - Dynamic : Fair    Ambulation/Gait Training:  Distance (ft): 2 Feet (ft)  Ambulation - Level of Assistance: Moderate assistance;Assist x2  Gait Abnormalities: Shuffling gait  Right Side Weight Bearing: Full  Left Side Weight Bearing: Full  Speed/Zulma: Slow  Step Length: Left shortened;Right shortened      Therapeutic Exercises:   1 x 8 AP    Pain Ratin/10 back pain     Activity Tolerance:   Poor, desaturates with exertion and requires oxygen, requires frequent rest breaks, and observed SOB with activity  Please refer to the flowsheet for vital signs taken during this treatment. After treatment patient left in no apparent distress:   Sitting in chair, Call bell within reach, and RN and Respiratory aware of O2 staus    COMMUNICATION/COLLABORATION:   The patients plan of care was discussed with: Occupational therapist, Registered nurse, and Respiratory therapist.       Problem: Mobility Impaired (Adult and Pediatric)  Goal: *Acute Goals and Plan of Care (Insert Text)  Description: Pt will be I with LE HEP in 7 days. Pt will perform bed mobility with min A x1 in 7 days. Pt will perform transfers with min Ax1 in 7 days. Pt will amb 10-25 feet with LRAD safely with mod I in 7 days.    Outcome: Progressing Towards Goal       Jerry Tabares PTA   Time Calculation: 27 mins

## 2020-10-08 NOTE — PROGRESS NOTES
Problem: Self Care Deficits Care Plan (Adult)  Goal: *Acute Goals and Plan of Care (Insert Text)  Description: Pt will be SBA sup <> sit in prep for EOB ADLs  Pt will be Mod I grooming sitting EOB  Pt will be Mod I LE dressing sitting EOB/long sit  Pt will be SBA sit <>  prep for toileting LRAD  Pt will be SBA toileting/toilet transfer/cloth mgmt LRAD  Pt will be IND following UE HEP in prep for self care tasks     Outcome: Progressing Towards Goal     OCCUPATIONAL THERAPY TREATMENT  Patient: Taj Osborn (8 y.o. male)  Date: 10/8/2020  Diagnosis: Heart failure ([facility] [I50.9]  Acute exacerbation of CHF (congestive heart failure) ([facility]) [I50.9]  Adult failure to thrive [R62.7]  Nausea & vomiting [R11.2]  Hypotension [I95.9]   <principal problem not specified>       Precautions:  Fall Precaution  Chart, occupational therapy assessment, plan of care, and goals were reviewed. ASSESSMENT  Pt received semi supine in bed agreeable for OT eval/tx. Patient continues with skilled OT services and is progressing towards goals. Pt requiring SBA for bed mobility today and scooting EoB and mod Ax2 sit<->stand and bed to chair with Rw and gait belt with cueing required to keep turning as pt wanting to sit before reaching recliner. Pt educated on safe transfer techniques and safe hand placement with pt verbalizing understanding. Pt educated on dulce UE HEP in prep for self care tasks with pt demonstrating and verbalizing understanding. Pt found on room air with SPO2 dropping to 84% with activity thus PTA found respiratory therapy and provided permission to place pt on 1L O2 via nasal canula after which pt SPO2 increased to 91% with significant rest break. Pt would benefit from continued skilled OT services while at [facility] in order to increase safety and independence with self care and functional transfers/mobility.  Recommend discharge to SNF when medically appropriate however per chart review pt and family requesting D/C to home thus will need gait belt and continued 24/7 care. PLAN :  Patient continues to benefit from skilled intervention to address the above impairments. Continue treatment per established plan of care. to address goals. Recommend with staff: 2 person assist mod A stand pivot with RW and gait belt to [facility] and recliner    Recommendation for discharge: (in order for the patient to meet his/her long term goals)  Recommend discharge to SNF when medically appropriate however per chart review pt and family requesting D/C to home thus will need gait belt and continued 24/7 care. This discharge recommendation:  Has been made in collaboration with the attending provider and/or case management           SUBJECTIVE:   Patient stated I would like to sit up in the chair    OBJECTIVE DATA SUMMARY:   Cognitive/Behavioral Status:  Neurologic State: Alert  Orientation Level: Oriented X4  Cognition: Follows commands     Functional Mobility and Transfers for ADLs:  Bed Mobility:  Rolling: Supervision  Supine to Sit: Stand-by assistance  Scooting: Stand-by assistance(EOB level)    Transfers:  Sit to Stand: Moderate assistance;Assist x2     Bed to Chair: Moderate assistance;Assist x2    Balance:  Sitting: Intact; With support  Standing: Impaired;Pull to stand; With support  Standing - Static: Fair  Standing - Dynamic : Fair    ADL Intervention:  Feeding  Feeding Assistance: Stand-by assistance  Cutting Food:  Total assistance (dependent)(dulce UE tremulous)  Food to Mouth: Stand-by assistance  Drink to Mouth: Stand-by assistance    Grooming  Grooming Assistance: Stand-by assistance  Position Performed: Seated in chair  Washing Face: Stand-by assistance  Washing Hands: Stand-by assistance    Lower Body Dressing Assistance  Socks: Stand-by assistance  Adaptive Equipment Used: Sock aid    Therapeutic Exercises:   Pt educated on and completed dulce UE HEP for 1 set of 5 reps in prep for self care tasks    Pain:  8/10 back pain    Activity Tolerance:   Poor, requires frequent rest breaks, and observed SOB with activity  Please refer to the flowsheet for vital signs taken during this treatment. After treatment patient left in no apparent distress:   Sitting in chair and Call bell within reach    COMMUNICATION/COLLABORATION:   The patients plan of care was discussed with: Physical therapist and Registered nurse. Nursing educated on safe transfer techniques for pt with nursing verbalizing understanding.       Prosperity Expose  Time Calculation: 27 mins

## 2020-10-08 NOTE — PROGRESS NOTES
Progress Note    Patient: Isabel Flores MRN: [id number]  SSN: [id number]    YOB: 1940  Age: 79 y.o. Sex: male      Admit Date: 9/22/2020    LOS: 15 days     Subjective:   No acute events overnight. Complains of constipation. Bowel movement yesterday. Objective:     Vitals:    10/08/20 1020 10/08/20 1023 10/08/20 1027 10/08/20 1141   BP: (!) 72/48 (!) 80/40 100/60 (!) 118/54   Pulse:    72   Resp:    18   Temp:    97.9 °F (36.6 °C)   SpO2:    100%   Weight:       Height:            Intake and Output:  Current Shift: No intake/output data recorded. Last three shifts: No intake/output data recorded. Physical Exam:   General:  Alert, cooperative, no distress, appears stated age. Eyes:  Conjunctivae/corneas clear. PERRL, EOMs intact. Fundi benign   Ears:  Normal TMs and external ear canals both ears. Nose: Nares normal. Septum midline. Mucosa normal. No drainage or sinus tenderness. Mouth/Throat: Lips, mucosa, and tongue normal. Teeth and gums normal.   Neck: Supple, symmetrical, trachea midline, no adenopathy, thyroid: no enlargment/tenderness/nodules, no carotid bruit and no JVD. Back:   Symmetric, no curvature. ROM normal. No CVA tenderness. Lungs:   Clear to auscultation bilaterally. Heart:  Regular rate and rhythm, S1, S2 normal, no murmur, click, rub or gallop. Abdomen:   Soft, non-tender. Bowel sounds normal. No masses,  No organomegaly. Extremities: Extremities normal, atraumatic, no cyanosis or edema. Pulses: 2+ and symmetric all extremities. Skin: Skin color, texture, turgor normal. No rashes or lesions   Lymph nodes: Cervical, supraclavicular, and axillary nodes normal.   Neurologic: CNII-XII intact. Normal strength, sensation and reflexes throughout. Lab/Data Review: All lab results for the last 24 hours reviewed.          Assessment:     Active Problems:    Heart failure ([address] (9/23/2020)      Adult failure to thrive (9/23/2020)      Nausea & vomiting (9/23/2020)      Hypotension (9/23/2020)      Acute exacerbation of CHF (congestive heart failure) ([facility] (9/23/2020)    Mr. Sean Vera is an 26-year-old -American male with:   1. 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 oboxotronic September 2017   2. Chest pain atypical for angina   3. Coronary artery disease (previous MI and status post coronary CABG)   4. Hypertension with hypertensive heart disease   5. Mixed Dyslipidemia      6.  Peripheral vessel disease   7. Type 2 diabetes mellitus   8. Former tobacco use   9. History of thyroid disease   10. Moderate pulmonary hypertension, RVSP 57 mmHg   11. History of bleeding per rectum and moderately severe diverticulosis of the descending colon and sigmoid colon. Plan:   Blood pressure remained stable and rhythm was stable overnight. Denied having any chest pain. No further cardiac testing planned at this time. Okay to discharge from cardiac standpoint. I will sign off and remain available if needed. I will be happy to see in 2 to 4 weeks after discharge or sooner if needed. Continue midodrine.     Signed By: Kelvin Everett MD     October 8, 2020

## 2020-10-09 LAB
GLUCOSE BLD STRIP.AUTO-MCNC: 131 MG/DL (ref 65–100)
GLUCOSE BLD STRIP.AUTO-MCNC: 140 MG/DL (ref 65–100)
GLUCOSE BLD STRIP.AUTO-MCNC: 175 MG/DL (ref 65–100)
PERFORMED BY, TECHID: ABNORMAL

## 2020-10-09 PROCEDURE — 74011250637 HC RX REV CODE- 250/637: Performed by: INTERNAL MEDICINE

## 2020-10-09 PROCEDURE — 94760 N-INVAS EAR/PLS OXIMETRY 1: CPT

## 2020-10-09 PROCEDURE — 82962 GLUCOSE BLOOD TEST: CPT

## 2020-10-09 PROCEDURE — 74011250637 HC RX REV CODE- 250/637: Performed by: PHYSICIAN ASSISTANT

## 2020-10-09 PROCEDURE — 65270000029 HC RM PRIVATE

## 2020-10-09 PROCEDURE — 97530 THERAPEUTIC ACTIVITIES: CPT

## 2020-10-09 PROCEDURE — 74011636637 HC RX REV CODE- 636/637: Performed by: INTERNAL MEDICINE

## 2020-10-09 PROCEDURE — 74011250637 HC RX REV CODE- 250/637: Performed by: HOSPITALIST

## 2020-10-09 PROCEDURE — 77010033678 HC OXYGEN DAILY

## 2020-10-09 PROCEDURE — 74011250636 HC RX REV CODE- 250/636: Performed by: HOSPITALIST

## 2020-10-09 RX ORDER — DOCUSATE SODIUM 100 MG/1
100 CAPSULE, LIQUID FILLED ORAL DAILY
Qty: 30 CAP | Refills: 2 | Status: SHIPPED | OUTPATIENT
Start: 2020-10-10 | End: 2021-01-08

## 2020-10-09 RX ORDER — POTASSIUM CHLORIDE 20 MEQ/1
20 TABLET, EXTENDED RELEASE ORAL DAILY
Qty: 30 TAB | Refills: 0 | Status: SHIPPED | OUTPATIENT
Start: 2020-10-10

## 2020-10-09 RX ORDER — MIDODRINE HYDROCHLORIDE 10 MG/1
10 TABLET ORAL
Qty: 90 TAB | Refills: 0 | Status: SHIPPED | OUTPATIENT
Start: 2020-10-09 | End: 2020-10-16

## 2020-10-09 RX ORDER — POLYETHYLENE GLYCOL 3350 17 G/17G
17 POWDER, FOR SOLUTION ORAL
Qty: 30 PACKET | Refills: 0 | Status: SHIPPED | OUTPATIENT
Start: 2020-10-09

## 2020-10-09 RX ADMIN — LEVOTHYROXINE SODIUM 88 MCG: 88 TABLET ORAL at 06:04

## 2020-10-09 RX ADMIN — MIDODRINE HYDROCHLORIDE 10 MG: 5 TABLET ORAL at 10:15

## 2020-10-09 RX ADMIN — CLOPIDOGREL BISULFATE 75 MG: 75 TABLET ORAL at 10:15

## 2020-10-09 RX ADMIN — Medication 1 TABLET: at 10:14

## 2020-10-09 RX ADMIN — DOCUSATE SODIUM 100 MG: 100 CAPSULE, LIQUID FILLED ORAL at 10:15

## 2020-10-09 RX ADMIN — HEPARIN SODIUM 5000 UNITS: 10000 INJECTION, SOLUTION INTRAVENOUS; SUBCUTANEOUS at 17:59

## 2020-10-09 RX ADMIN — DRONABINOL 5 MG: 2.5 CAPSULE ORAL at 06:04

## 2020-10-09 RX ADMIN — POLYETHYLENE GLYCOL 3350 17 G: 17 POWDER, FOR SOLUTION ORAL at 10:14

## 2020-10-09 RX ADMIN — MIDODRINE HYDROCHLORIDE 10 MG: 5 TABLET ORAL at 17:59

## 2020-10-09 RX ADMIN — FERROUS SULFATE TAB 325 MG (65 MG ELEMENTAL FE) 325 MG: 325 (65 FE) TAB at 10:15

## 2020-10-09 RX ADMIN — ATORVASTATIN CALCIUM 40 MG: 40 TABLET, FILM COATED ORAL at 23:04

## 2020-10-09 RX ADMIN — OXYCODONE HYDROCHLORIDE 5 MG: 5 TABLET ORAL at 14:58

## 2020-10-09 RX ADMIN — MULTIVITAMIN TABLET 1 TABLET: TABLET at 10:14

## 2020-10-09 RX ADMIN — INSULIN GLARGINE 12 UNITS: 100 INJECTION, SOLUTION SUBCUTANEOUS at 23:04

## 2020-10-09 RX ADMIN — MIDODRINE HYDROCHLORIDE 10 MG: 5 TABLET ORAL at 13:19

## 2020-10-09 RX ADMIN — RENO CAPS 1 CAPSULE: 100; 1.5; 1.7; 20; 10; 1; 150; 5; 6 CAPSULE ORAL at 10:15

## 2020-10-09 RX ADMIN — OXYCODONE HYDROCHLORIDE 5 MG: 5 TABLET ORAL at 10:14

## 2020-10-09 RX ADMIN — EZETIMIBE 10 MG: 10 TABLET ORAL at 10:15

## 2020-10-09 RX ADMIN — POTASSIUM CHLORIDE 20 MEQ: 1500 TABLET, EXTENDED RELEASE ORAL at 10:14

## 2020-10-09 RX ADMIN — DRONABINOL 5 MG: 2.5 CAPSULE ORAL at 17:59

## 2020-10-09 RX ADMIN — LEVOFLOXACIN 750 MG: 5 INJECTION, SOLUTION INTRAVENOUS at 13:19

## 2020-10-09 RX ADMIN — FUROSEMIDE 40 MG: 40 TABLET ORAL at 10:15

## 2020-10-09 RX ADMIN — PANTOPRAZOLE SODIUM 40 MG: 40 TABLET, DELAYED RELEASE ORAL at 06:06

## 2020-10-09 RX ADMIN — HEPARIN SODIUM 5000 UNITS: 10000 INJECTION, SOLUTION INTRAVENOUS; SUBCUTANEOUS at 23:04

## 2020-10-09 RX ADMIN — HEPARIN SODIUM 5000 UNITS: 10000 INJECTION, SOLUTION INTRAVENOUS; SUBCUTANEOUS at 10:14

## 2020-10-09 NOTE — DISCHARGE SUMMARY
Hospitalist Discharge Summary     Patient ID:  Roque Gama  [id number]  7 y.o.  1940 9/22/2020    PCP on record: Angelica Atkins MD    Admit date: 9/22/2020  Discharge date and time: 10/9/2020    DISCHARGE DIAGNOSIS:        CONSULTATIONS:  IP CONSULT TO GENERAL SURGERY  IP CONSULT TO CARDIOLOGY    Excerpted HPI from H&P of Neno Greene MD:      ______________________________________________________________________  DISCHARGE SUMMARY/HOSPITAL COURSE:  for full details see H&P, daily progress notes, labs, consult notes. 79 y. o. male with a history of diabetes, ischemic cardiomyopathy, hypertension presents to the emergency room from [address] [place] [address] with complaining of increasing shortness of breath, blood on his lower extremity.   He has a bullous formation on left lower extremity just above the ankle, it was very big filled with fluid.    He denies any wounds on the back, admits the weakness is not improving.  Fatigue is the main complaint.    Was admitted for bleb fluid-filled on his left lower extremity: Looks like pressure wound with localized fluid collection.  surgery consulted for debridement, and also wound care team.    Cardiology consulted for severe peripheral vascular disease with suspected pressure on the left lower extremity at this time. 2D echo showed normal EF 55 to 59% with diastolic heart failure my initial chest x-ray showed pulmonary edema patient was responded with IV Lasix  Chest ultrasound ultimately showed no DVT, respiratory therapy evaluate patient, patient desaturated below 87 while ambulating, family  came in yesterday not able to taking care of patient at home now they change   mind want patient to go to skilled nursing facility        _______________________________________________________________________  Patient seen and examined by me on discharge day.   Pertinent Findings:  Gen:    Not in distress  Chest: Clear lungs  CVS:   Regular rhythm. No edema  Abd:  Soft, not distended, not tender  Neuro:  Alert, awake on NCo2  _______________________________________________________________________  DISCHARGE MEDICATIONS:   Current Discharge Medication List      START taking these medications    Details   b complex-vitamin c-folic acid (NEPHROCAPS) 1 mg capsule Take 1 Cap by mouth daily. Qty: 30 Cap, Refills: 0      docusate sodium (COLACE) 100 mg capsule Take 1 Cap by mouth daily for 90 days. Qty: 30 Cap, Refills: 2      polyethylene glycol (MIRALAX) 17 gram packet Take 1 Packet by mouth daily as needed for Constipation. Qty: 30 Packet, Refills: 0      midodrine (PROAMATINE) 10 mg tablet Take 1 Tab by mouth three (3) times daily (with meals) for 30 days. Qty: 90 Tab, Refills: 0      potassium chloride (K-DUR, KLOR-CON) 20 mEq tablet Take 1 Tab by mouth daily.   Qty: 30 Tab, Refills: 0         CONTINUE these medications which have NOT CHANGED    Details   clopidogrel (PLAVIX) 75 mg tab TAKE ONE TABLET BY MOUTH ONE TIME DAILY  Refills: 3      JANUVIA 100 mg tablet TAKE ONE TABLET BY MOUTH ONE TIME DAILY  Refills: 0      ezetimibe (ZETIA) 10 mg tablet TAKE ONE TABLET BY MOUTH AT BEDTIME  Refills: 1      ranolazine ER (RANEXA) 1,000 mg TAKE ONE TABLET BY MOUTH TWICE A DAY  Refills: 4      levothyroxine (SYNTHROID) 88 mcg tablet TAKE ONE TABLET BY MOUTH EVERY MORNING  Refills: 1      cyanocobalamin (VITAMIN B12) 1,000 mcg/mL injection INJECT 1 ML SUBCUTANEOUSLY ONCE MONTHLY  Refills: 5      atorvastatin (LIPITOR) 40 mg tablet TAKE ONE TABLET BY MOUTH ONE TIME DAILY AT BEDTIME  Refills: 1      ferrous sulfate 325 mg (65 mg iron) tablet TAKE ONE TABLET BY MOUTH TWICE A DAY  Refills: 1      furosemide (LASIX) 40 mg tablet TAKE ONE TABLET BY MOUTH ONE TIME DAILY  Refills: 1      pantoprazole (PROTONIX) 40 mg tablet TAKE ONE TABLET BY MOUTH EVERY MORNING  Refills: 1      nitroglycerin (NITROSTAT) 0.4 mg SL tablet 0.4 mg by SubLINGual route every five (5) minutes as needed for Chest Pain. Up to 3 doses. cholecalciferol (VITAMIN D3) 1,000 unit tablet Take  by mouth daily. insulin detemir (LEVEMIR FLEXTOUCH U-100 INSULN SC) by SubCUTAneous route. Indications: as needed for BS > 150 once a day then take 10 units         STOP taking these medications       metoprolol succinate (TOPROL-XL) 25 mg XL tablet Comments:   Reason for Stopping:         lisinopril (PRINIVIL, ZESTRIL) 5 mg tablet Comments:   Reason for Stopping:                 Patient Follow Up Instructions: Activity: Activity as tolerated  Diet: Cardiac Diet  Wound Care: None needed    Follow-up with PCP in one  week      Follow-up Information     Follow up With Specialties Details Why Sanjuanita Buckner MD Internal Medicine On 10/14/2020 @ 11:30 a.m.  [address] [phone]          ________________________________________________________________    Risk of deterioration: Moderate    Condition at Discharge:  Stable  __________________________________________________________________    Disposition  SNF/LTC    ____________________________________________________________________    Code Status: Full Code  ___________________________________________________________________      Total time in minutes spent coordinating this discharge (includes going over instructions, follow-up, prescriptions, and preparing report for sign off to her PCP) :  35 minutes    Signed:  Mercedes Smith MD

## 2020-10-09 NOTE — PROGRESS NOTES
CM attempted to contact patient's daughter, Mauro Hernandes, however no answer at this time. CM contacted patient's other daughter, Vale Davis [phone], to discuss DCP as it appears patient is no longer able to return home. Ms. Vale Davis stated that it is correct that patient can not return home as the person that lived in the home that was to care for patient has \"moved out\" and they are not able to provide the care he needs. Ms. Vale Davis and family request that patient go to SNF to \"build up his strength\" prior to returning home, however they do not want patient to return to [facility] as they feel they did not provide adequate care for patient at his last admission to the facility. CM asked if they had a facility in mind, they did not know at this time. As patient does not have an official POA, decision making is to be split between the siblings, CM explained this to Vale Davis and asked her to contact her siblings to make a decision this date in order for a referral to be sent. Ms. Vale Davis stated she would be contacting her sisters and call CM back. CM continues to follow.

## 2020-10-09 NOTE — PROGRESS NOTES
CM missed a phone call from patient's other daughter, Aleah Wang [phone]. CM attempted to call Ms. Aleah Wang back, her phone is off at this time, CM left a  requesting a callback ASAP. CM continues to follow.

## 2020-10-09 NOTE — PROGRESS NOTES
PHYSICAL THERAPY TREATMENT  Patient: Dayana Garcia (78 y.o. male)  Date: 10/9/2020  Diagnosis: Heart failure ([facility] [I50.9]  Acute exacerbation of CHF (congestive heart failure) ([facility] [I50.9]  Adult failure to thrive [R62.7]  Nausea & vomiting [R11.2]  Hypotension [I95.9]   <principal problem not specified>       Precautions:    Chart, physical therapy assessment, plan of care and goals were reviewed. ASSESSMENT  Patient continues with skilled PT services and is progressing towards goals. Pt. Semi supine in bed upon arrival and agreeable to therapy session. Initiated session with semi supine LE TE, and Seated LE TE. Fatigues quickly. Able to perform bed mobility and transfer from semi supine position. Recommend DC to SNF. Left in bed with callbell and all needs met    Current Level of Function Impacting Discharge (mobility/balance): Poor endurance    Other factors to consider for discharge: PMH         PLAN :  Patient continues to benefit from skilled intervention to address the above impairments. Continue treatment per established plan of care. to address goals. Recommendation for discharge: (in order for the patient to meet his/her long term goals)  Therapy up to 5 days/week in SNF setting    This discharge recommendation:  Has been made in collaboration with the attending provider and/or case management    IF patient discharges home will need the following DME: wheelchair       SUBJECTIVE:   Patient stated IM okay.     OBJECTIVE DATA SUMMARY:   Critical Behavior:  Neurologic State: Alert  Orientation Level: Oriented X4  Cognition: Appropriate safety awareness     Functional Mobility Training:  Bed Mobility:  Rolling: Supervision  Supine to Sit: Supervision  Sit to Supine: Supervision  Scooting: Supervision        Transfers:                                   Balance:  Sitting: Intact; With support  Ambulation/Gait Training:                                                        Stairs: Therapeutic Exercises:   Therapeutic Exercises:       EXERCISE   Sets   Reps   Active Active Assist   Passive Self ROM   Comments   Ankle Pumps  20 [x] [] [] []    Quad Sets/Glut Sets  20 [x] [] [] []    Hamstring Sets   [] [] [] []    Short Arc Quads   [] [] [] []    Heel Slides  20 [x] [] [] []    Straight Leg Raises   [] [] [] []    Hip abd/add  20 [x] [] [] []    Long Arc Quads  20 [x] [] [] []    Marching  20 [x] [] [] []       [] [] [] []        Pain Ratin/10    Activity Tolerance:   Good  Please refer to the flowsheet for vital signs taken during this treatment. After treatment patient left in no apparent distress:   Supine in bed    COMMUNICATION/COLLABORATION:   The patients plan of care was discussed with: Physical therapy assistant.      Louis Waite   Time Calculation: 17 mins

## 2020-10-09 NOTE — PROGRESS NOTES
[id number]  Dr. Thom Baker paged. 21 [phone] Dr. Thom Baker made aware of patient not d/c home and daughters concern.

## 2020-10-09 NOTE — PROGRESS NOTES
Patient turned, pulled up in bed, and repositioned with little effort from patient at. Maximum encouragement required. 0730, 0930, 1130, 1300, 1530, 1730. Patient cleaned up with wipes and soap, linen change and absorbent pad change at 0930, 1230, 1630, and 1800. Patient had stools 4x. Dressing change on lower left leg completed. Clean and intact dressing. Mepilex changed and medihoney applied on buttocks bilaterally (right side stage 2, left side appears stage 1). Wound consult put in. Will continue to monitor.

## 2020-10-09 NOTE — PROGRESS NOTES
Comprehensive Nutrition Assessment    Type and Reason for Visit: Reassess(goal)    Nutrition Recommendations/Plan:  Continue Diabetic diet   Soft restriction added per pt preference   Continue Ensure High Protein TID   Food preferences added  Add anti-emetics    Nutrition Assessment:  Previously in ICU for pressors. BP now generally stable. Transferred to med floor 9/28. D/c orders in x1 week, pending dispo plan for snf vs HH. Fair appetites but no recent intakes per EMR. Pt endorsed poor intakes recently 2/2 nausea with x3 episodes of emesis (none documented in EMR), stated even looking at food makes him nauseous. Pt also states food has no taste. Is contrasts report at last assessment when pt with good appetite and ate 50-75% of meals. Discussed with pt to find foods he can tolerate, RD to add these foods as preferences. ? If nausea medication related, pt unable to state if he started a new medication recently. RD discussed antiemetics, ginger ale, pt agreeable. Pt continues to endorses good intakes of Ensure HP, drinks at least 2/day. Labs: BG , H/H 9.6/29.5, BUN 23. Meds: Vit B complex with vitamin C, vitamin D3, docusate, marinol, FeSu, lasix, heparin, insulin, levofloxacin, levothyroxine, MVI, PPI, miralax, ranolazine, [place]    Malnutrition Assessment:  Malnutrition Status:  Mild malnutrition    Context:  Acute illness     Findings of the 6 clinical characteristics of malnutrition:   Energy Intake:  7 - 50% or less of est energy requirements for 5 or more days  Weight Loss:  No significant weight loss     Body Fat Loss:  No significant body fat loss,     Muscle Mass Loss:  No significant muscle mass loss,    Fluid Accumulation:  No significant fluid accumulation,      Estimated Daily Nutrient Needs:  Energy (kcal):  1925kcal (20kcal/kg)  Protein (g):  96kg (1.0g/kg)       Fluid (ml/day):  1925mL (1mL/kcal)   Needs for wt loss    Nutrition Related Findings:  +N/V, no c/d. Noted abd distended, ?  Ascites as pt with hx, could be cause of nausea. +1 sacral edema, improved. Pt previously denied dysphagia, today endorsed that having softer foods may help. Last BM 10/9. Wounds:    Stage II(St 2 PU sacrum, LLL Blister s/p I&D)      Current Nutrition Therapies:  DIET NUTRITIONAL SUPPLEMENTS Breakfast, Dinner, Lunch; Ensure High Protein  DIET DIABETIC CONSISTENT CARB Soft Solids    Anthropometric Measures:  · Height:  5' 6\" (167.6 cm)  · Current Body Wt:  95.9 kg (211 lb 6.7 oz)(10/5)   · Admission Body Wt:  206 lb 12.7 oz(9/25)    · Usual Body Wt:  184 lb 15.5 oz     · Ideal Body Wt:  142 lbs:  145.6 %   · Adjusted Body Weight: (90kg)   · BMI Category:  Obese class 1 (BMI 30.0-34. 9)     Wt hx: 95.9kg (10/5), 93.8kg (9/25)  Wt fluctuations inpatient likely 2//2 fluid    Nutrition Diagnosis:   · Inadequate energy intake related to (poor appetite (improving)) as evidenced by intake 51-75%      Nutrition Interventions:   Food and/or Nutrient Delivery: Continue current diet, Continue oral nutrition supplement  Nutrition Education and Counseling: No recommendations at this time  Coordination of Nutrition Care: Continued inpatient monitoring    Goals:  Meet >50% EENs via PO (goal not met, continue)  Wt loss 1kg +/- 0.5kg per week (goal not met, continue)  BG <180mg/dL (goal not met)  Maintain skin integrity  (goal not met, adjusted)       Nutrition Monitoring and Evaluation:   Food/Nutrient Intake Outcomes: Food and nutrient intake  Physical Signs/Symptoms Outcomes: Nausea/vomiting, Weight, Fluid status or edema, Skin, Biochemical data    Discharge Planning:    No discharge needs at this time     Electronically signed by Pauline Romero RD on 10/9/2020 at 7:28 AM    Contact: Ext [phone]

## 2020-10-10 LAB
GLUCOSE BLD STRIP.AUTO-MCNC: 141 MG/DL (ref 65–100)
GLUCOSE BLD STRIP.AUTO-MCNC: 220 MG/DL (ref 65–100)
GLUCOSE BLD STRIP.AUTO-MCNC: 222 MG/DL (ref 65–100)
PERFORMED BY, TECHID: ABNORMAL

## 2020-10-10 PROCEDURE — 74011250637 HC RX REV CODE- 250/637: Performed by: INTERNAL MEDICINE

## 2020-10-10 PROCEDURE — 74011250636 HC RX REV CODE- 250/636: Performed by: HOSPITALIST

## 2020-10-10 PROCEDURE — 82962 GLUCOSE BLOOD TEST: CPT

## 2020-10-10 PROCEDURE — 74011636637 HC RX REV CODE- 636/637: Performed by: HOSPITALIST

## 2020-10-10 PROCEDURE — 65270000029 HC RM PRIVATE

## 2020-10-10 PROCEDURE — 74011636637 HC RX REV CODE- 636/637: Performed by: INTERNAL MEDICINE

## 2020-10-10 PROCEDURE — 74011250637 HC RX REV CODE- 250/637: Performed by: HOSPITALIST

## 2020-10-10 RX ORDER — OXYCODONE HYDROCHLORIDE 5 MG/1
5 TABLET ORAL
Status: DISCONTINUED | OUTPATIENT
Start: 2020-10-10 | End: 2020-10-16 | Stop reason: HOSPADM

## 2020-10-10 RX ADMIN — Medication 1 TABLET: at 08:25

## 2020-10-10 RX ADMIN — LEVOTHYROXINE SODIUM 88 MCG: 88 TABLET ORAL at 06:45

## 2020-10-10 RX ADMIN — PANTOPRAZOLE SODIUM 40 MG: 40 TABLET, DELAYED RELEASE ORAL at 06:45

## 2020-10-10 RX ADMIN — MULTIVITAMIN TABLET 1 TABLET: TABLET at 08:25

## 2020-10-10 RX ADMIN — HEPARIN SODIUM 5000 UNITS: 10000 INJECTION, SOLUTION INTRAVENOUS; SUBCUTANEOUS at 21:01

## 2020-10-10 RX ADMIN — INSULIN GLARGINE 12 UNITS: 100 INJECTION, SOLUTION SUBCUTANEOUS at 21:01

## 2020-10-10 RX ADMIN — FERROUS SULFATE TAB 325 MG (65 MG ELEMENTAL FE) 325 MG: 325 (65 FE) TAB at 08:25

## 2020-10-10 RX ADMIN — HEPARIN SODIUM 5000 UNITS: 10000 INJECTION, SOLUTION INTRAVENOUS; SUBCUTANEOUS at 06:45

## 2020-10-10 RX ADMIN — FUROSEMIDE 40 MG: 40 TABLET ORAL at 13:05

## 2020-10-10 RX ADMIN — MIDODRINE HYDROCHLORIDE 10 MG: 5 TABLET ORAL at 18:01

## 2020-10-10 RX ADMIN — ATORVASTATIN CALCIUM 40 MG: 40 TABLET, FILM COATED ORAL at 21:01

## 2020-10-10 RX ADMIN — POTASSIUM CHLORIDE 20 MEQ: 1500 TABLET, EXTENDED RELEASE ORAL at 08:25

## 2020-10-10 RX ADMIN — DRONABINOL 5 MG: 2.5 CAPSULE ORAL at 18:01

## 2020-10-10 RX ADMIN — DOCUSATE SODIUM 100 MG: 100 CAPSULE, LIQUID FILLED ORAL at 08:25

## 2020-10-10 RX ADMIN — MIDODRINE HYDROCHLORIDE 10 MG: 5 TABLET ORAL at 08:25

## 2020-10-10 RX ADMIN — INSULIN LISPRO 3 UNITS: 100 INJECTION, SOLUTION INTRAVENOUS; SUBCUTANEOUS at 13:04

## 2020-10-10 RX ADMIN — INSULIN LISPRO 2 UNITS: 100 INJECTION, SOLUTION INTRAVENOUS; SUBCUTANEOUS at 21:01

## 2020-10-10 RX ADMIN — CLOPIDOGREL BISULFATE 75 MG: 75 TABLET ORAL at 08:25

## 2020-10-10 RX ADMIN — DRONABINOL 5 MG: 2.5 CAPSULE ORAL at 06:45

## 2020-10-10 RX ADMIN — MIDODRINE HYDROCHLORIDE 10 MG: 5 TABLET ORAL at 13:07

## 2020-10-10 RX ADMIN — EZETIMIBE 10 MG: 10 TABLET ORAL at 08:25

## 2020-10-10 RX ADMIN — RENO CAPS 1 CAPSULE: 100; 1.5; 1.7; 20; 10; 1; 150; 5; 6 CAPSULE ORAL at 08:25

## 2020-10-10 RX ADMIN — HEPARIN SODIUM 5000 UNITS: 10000 INJECTION, SOLUTION INTRAVENOUS; SUBCUTANEOUS at 13:05

## 2020-10-10 NOTE — PROGRESS NOTES
CM contacted patient's daughter Arabella Sweet ([phone]) to get a choice for SNF; received no answer, and left a voice message. CM contacted patient's other daughter Kim Nails [phone]); to get a choice for SNF; received a busy signal.     CM contacted patient's other daughter Chica Harris [phone]) to get a choice for SNF; daughter stated that she had no preference and just stated that patient does not want to return back to [place]. CM sent multiple referrals for SNF.

## 2020-10-10 NOTE — PROGRESS NOTES
Hospitalist Progress Note           Daily Progress Note: 10/10/2020      Subjective: The patient is seen for follow  up.   He is more awake alert no shortness of breath, cannula oxygen, currently waiting for placement    Problem List:  Problem List as of 10/10/2020 Date Reviewed: 9/23/2020          Codes Class Noted - Resolved    Cardiomyopathy ([facility] ICD-10-CM: I42.9  ICD-9-CM: 425.4  5/17/2019 - Present        Heart failure ([facility] ICD-10-CM: I50.9  ICD-9-CM: 428.9  9/23/2020 - Present        Adult failure to thrive ICD-10-CM: R62.7  ICD-9-CM: 783.7  9/23/2020 - Present        Nausea & vomiting ICD-10-CM: R11.2  ICD-9-CM: 787.01  9/23/2020 - Present        Hypotension ICD-10-CM: I95.9  ICD-9-CM: 458.9  9/23/2020 - Present        Acute exacerbation of CHF (congestive heart failure) (HCC) ICD-10-CM: I50.9  ICD-9-CM: 428.0  9/23/2020 - Present        Ischemic cardiomyopathy ICD-10-CM: I25.5  ICD-9-CM: 414.8  4/23/2019 - Present        Left bundle branch block ICD-10-CM: I44.7  ICD-9-CM: 426.3  4/23/2019 - Present              Medications reviewed  Current Facility-Administered Medications   Medication Dose Route Frequency    SITagliptin (JANUVIA) tablet tab 50 mg  50 mg Oral DAILY    polyethylene glycol (MIRALAX) packet 17 g  17 g Oral DAILY PRN    polyethylene glycol (MIRALAX) packet 17 g  17 g Oral DAILY PRN    docusate sodium (COLACE) capsule 100 mg  100 mg Oral DAILY    insulin glargine (LANTUS) injection 12 Units  12 Units SubCUTAneous QHS    potassium chloride (K-DUR, KLOR-CON) SR tablet 20 mEq  20 mEq Oral DAILY    dronabinoL (MARINOL) capsule 5 mg  5 mg Oral ACB&D    midodrine (PROAMATINE) tablet 10 mg  10 mg Oral TID WITH MEALS    levoFLOXacin (LEVAQUIN) 750 mg in D5W IVPB  750 mg IntraVENous Q48H    heparin porcine injection 5,000 Units  5,000 Units SubCUTAneous Q8H    glucose chewable tablet 16 g  4 Tab Oral PRN    glucagon (GLUCAGEN) injection 1 mg  1 mg IntraMUSCular PRN    dextrose (D50W) injection syrg 12.5-25 g  25-50 mL IntraVENous PRN    atorvastatin (LIPITOR) tablet 40 mg  40 mg Oral QHS    cholecalciferol (VITAMIN D3) (1000 Units /25 mcg) tablet 1 Tab  1,000 Units Oral DAILY    clopidogreL (PLAVIX) tablet 75 mg  75 mg Oral DAILY    ezetimibe (ZETIA) tablet 10 mg  10 mg Oral DAILY    ferrous sulfate tablet 325 mg  1 Tab Oral DAILY WITH BREAKFAST    furosemide (LASIX) tablet 40 mg  40 mg Oral DAILY    levothyroxine (SYNTHROID) tablet 88 mcg  88 mcg Oral 6am    pantoprazole (PROTONIX) tablet 40 mg  40 mg Oral ACB    [Held by provider] ranolazine ER (RANEXA) tablet 500 mg  500 mg Oral BID    insulin lispro (HUMALOG) injection   SubCUTAneous AC&HS    dextrose (D50W) injection syrg 12.5-25 g  25-50 mL IntraVENous PRN    multivitamin (ONE A DAY) tablet 1 Tab  1 Tab Oral DAILY    B complex-vitaminC-folic acid (NEPHROCAP) cap  1 Cap Oral DAILY    acetaminophen (TYLENOL) tablet 650 mg  650 mg Oral Q4H PRN    oxyCODONE IR (ROXICODONE) tablet 5 mg  5 mg Oral Q6H PRN       Review of Systems:   A comprehensive review of systems was negative except for that written in the HPI. Related weakness    Objective:   Physical Exam:     Visit Vitals  BP (!) 89/53 (BP 1 Location: Right arm, BP Patient Position: At rest;Supine)   Pulse 78   Temp 97.6 °F (36.4 °C)   Resp 20   Ht 5' 6\" (1.676 m)   Wt 95.9 kg (211 lb 6.7 oz)   SpO2 98%   BMI 34.12 kg/m²    O2 Flow Rate (L/min): 2 l/min O2 Device: Nasal cannula    Temp (24hrs), Av.9 °F (36.6 °C), Min:97.5 °F (36.4 °C), Max:98.2 °F (36.8 °C)    No intake/output data recorded. 10/08 1901 - 10/10 0700  In: 76 [P.O.:75]  Out: 325 [Urine:325]    General:  Alert, cooperative, no distress, appears stated age. Lungs:   Clear to auscultation bilaterally. Chest wall:  No tenderness or deformity. Heart:  Regular rate and rhythm, S1, S2 normal, no murmur, click, rub or gallop. Abdomen:   Soft, non-tender.  Bowel sounds normal. No masses,  No organomegaly. Extremities: Extremities normal, atraumatic, no cyanosis or edema. Pulses: 2+ and symmetric all extremities. Skin: Skin color, texture, turgor normal. No rashes or lesions   Neurologic: CNII-XII intact. No gross sensory or motor deficits     Data Review:       Recent Days:  Recent Labs     10/08/20  0400   WBC 10.8   HGB 9.6*   HCT 29.5*   *     Recent Labs     10/08/20  0400      K 4.4      CO2 24   *   BUN 23*   CREA 1.15   CA 9.3     No results for input(s): PH, PCO2, PO2, HCO3, FIO2 in the last 72 hours. 24 Hour Results:  Recent Results (from the past 24 hour(s))   GLUCOSE, POC    Collection Time: 10/09/20  7:41 PM   Result Value Ref Range    Glucose (POC) 175 (H) 65 - 100 mg/dL    Performed by VIJAYA Rivera 51, POC    Collection Time: 10/10/20  7:11 AM   Result Value Ref Range    Glucose (POC) 141 (H) 65 - 100 mg/dL    Performed by Justin Roche    GLUCOSE, POC    Collection Time: 10/10/20 12:41 PM   Result Value Ref Range    Glucose (POC) 222 (H) 65 - 100 mg/dL    Performed by Justin Roche      Study Result     Chest one view.     AP semierect portable at 0437 on 10/4/2020.     Comparison 10/1/2020.     There has been a remote sternotomy. A central line remains in place. There are  pacer electrodes in the right atrium, right ventricle and coronary sinus.     The heart remains enlarged with calcified plaque in the aorta. Interstitial  edema is redemonstrated. Small bilateral pleural effusions are likely. There is  no pneumothorax or lobar consolidation bilaterally    IMPRESSION  IMPRESSION: Persistent interstitial edema with small bilateral pleural  effusions.            Assessment/          Congestive heart failure: Resolved patient currently off Lasix due to hypotension  Hypotension patient on Midodrine  Generalized weakness continue PT OT plans discharge to rehab  Left lower lobe pneumonia: Patient on Levaquin  Plan:  Continue IV antibiotics breathing treatment as needed  Decrease sedating medicine  Encourage p.o. taking  Follow-up chest x-ray in the morning  Currently waiting for placement  Patient is a full code    Care Plan discussed with: Patient/Family    Total time spent with patient: 30 minutes.     Ximena Aldridge MD

## 2020-10-10 NOTE — PROGRESS NOTES
CM assessed patient at bedside. Patient's son Rodríguez Lucero was present for assessment. Patient was admitted to hospital for increasing shortness of breath, blood on his lower extremity. Patient was feeling weak, tired, and had no energy. Patient has an RUR score of 22%. Patient reportedly resides with his daughter Lee Mtz and granddaughter in an apartment. Patient reported that he has been residing there for two months. Patient uses a wheelchair, has a history of falling, and does not drive. Patient reported that he was independent at one point, but started feeling. Patient has received inpatient PT and OT. Patient reported that he was at [facility] for over one month. Patient is under the care of Dr. Agustín Bacon and had an appointment two or three months ago. Patient reported that his daughter Gus Rodriguez [phone] is POA. Patient has no AMD.    Patient stated that he was not sure if he wanted to return to [facility] again for IRF. CM provided patient with a choice list for SNF. Patient stated that he wanted to review with his children. CM will continue to follow the patient for discharge planning.

## 2020-10-10 NOTE — WOUND CARE
IP WOUND CONSULT Roque Gama MEDICAL RECORD NUMBER:  [id number] AGE: 79 y.o. GENDER: male  : 1940 TODAY'S DATE:  10/10/2020 GENERAL  
 
[] Follow-up [x] New Consult Roque Gama is a 79 y.o. male referred by:  
[] Physician 
[x] Nursing 
[] Other: PAST MEDICAL HISTORY Past Medical History:  
Diagnosis Date  Diabetes ([facility]  Essential hypertension  Hyperlipidemia  Myocardial infarction ([facility]  Pacemaker PAST SURGICAL HISTORY Past Surgical History:  
Procedure Laterality Date  HX CORONARY ARTERY BYPASS GRAFT  [place] INSJ/RPLCMT PERM DFB W/TRNSVNS LDS 1/DUAL [facility]MBR N/A 2019  
 pm-> ICD upgrade, RV lead, [employer] performed by Derrek Ayala MD at [facility] LAB FAMILY HISTORY Family History Problem Relation Age of Onset  No Known Problems Mother  No Known Problems Father ALLERGIES Allergies Allergen Reactions  Bactrim [Sulfamethoprim] Other (comments) MEDICATIONS No current facility-administered medications on file prior to encounter. Current Outpatient Medications on File Prior to Encounter Medication Sig Dispense Refill  metoprolol succinate (TOPROL-XL) 25 mg XL tablet TAKE ONE TABLET BY MOUTH ONE TIME DAILY  4  
 clopidogrel (PLAVIX) 75 mg tab TAKE ONE TABLET BY MOUTH ONE TIME DAILY  3  
 JANUVIA 100 mg tablet TAKE ONE TABLET BY MOUTH ONE TIME DAILY  0  
 ezetimibe (ZETIA) 10 mg tablet TAKE ONE TABLET BY MOUTH AT BEDTIME  1  
 ranolazine ER (RANEXA) 1,000 mg TAKE ONE TABLET BY MOUTH TWICE A DAY  4  
 levothyroxine (SYNTHROID) 88 mcg tablet TAKE ONE TABLET BY MOUTH EVERY MORNING  1  
 cyanocobalamin (VITAMIN B12) 1,000 mcg/mL injection INJECT 1 ML SUBCUTANEOUSLY ONCE MONTHLY  5  
 atorvastatin (LIPITOR) 40 mg tablet TAKE ONE TABLET BY MOUTH ONE TIME DAILY AT BEDTIME  1  
 ferrous sulfate 325 mg (65 mg iron) tablet TAKE ONE TABLET BY MOUTH TWICE A DAY  1  
  lisinopril (PRINIVIL, ZESTRIL) 5 mg tablet 2.5 mg daily  3  
 furosemide (LASIX) 40 mg tablet TAKE ONE TABLET BY MOUTH ONE TIME DAILY  1  
 pantoprazole (PROTONIX) 40 mg tablet TAKE ONE TABLET BY MOUTH EVERY MORNING  1  
 nitroglycerin (NITROSTAT) 0.4 mg SL tablet 0.4 mg by SubLINGual route every five (5) minutes as needed for Chest Pain. Up to 3 doses.  cholecalciferol (VITAMIN D3) 1,000 unit tablet Take  by mouth daily.  insulin detemir (LEVEMIR FLEXTOUCH U-100 INSULN SC) by SubCUTAneous route. Indications: as needed for BS > 150 once a day then take 10 units [unfilled] Visit Vitals BP (!) 89/53 (BP 1 Location: Right arm, BP Patient Position: At rest;Supine) Pulse 78 Temp 97.6 °F (36.4 °C) Resp 20 Ht 5' 6\" (1.676 m) Wt 95.9 kg (211 lb 6.7 oz) SpO2 98% BMI 34.12 kg/m² ASSESSMENT Wound Identification: Yes Wound Type: Friction/shear with MASD Dressing change: Zinc paste Contributing Factors: decreased mobility, shear force, incontinence of stool, incontinence of urine and obesity Wound Chest Left;Upper (Active) Number of days: 512 Wound Pretibial Distal;Left partial thickness (Active) Dressing Status Other (Comment) 10/10/20 1621 Dressing Type Gauze wrap (marlen); Honey 10/10/20 1621 Non-staged Wound Description Partial thickness 10/10/20 1621 Shape Round regular 10/10/20 1621 Wound Length (cm) 8.5 cm 10/10/20 1621 Wound Width (cm) 7.5 cm 10/10/20 1621 Wound Depth (cm) 0.1 cm 10/10/20 1621 Wound Surface Area (cm^2) 63.75 cm^2 10/10/20 1621 Wound Volume (cm^3) 6.38 cm^3 10/10/20 1621 Change in Wound Size % -26.49 10/10/20 1621 Condition of Base Granulation;Pink 10/10/20 1621 Condition of Edges Open 10/10/20 1621 Assessment Clean;Drainage 10/10/20 1621 Tissue Type Percent Pink 100 09/24/20 1744 Drainage Amount Scant 10/10/20 1621 Drainage Color Serous 10/10/20 1621 Wound Odor None 10/10/20 1621 Hortencia-wound Assessment Clean;Dry; Intact 10/10/20 1621 Margins Attached edges 09/24/20 1744 Cleansing and Cleansing Agents  Normal saline 10/10/20 1621 Dressing Changed Changed/New 10/10/20 1621 Dressing Type Applied ABD pad;Alginate;Gauze wrap (marlen) 10/10/20 1621 Procedure Tolerated Well 10/02/20 0711 Number of days: 16 Wound Buttocks Friction and shear injury with MASD 10/10/20 (Active) Non-staged Wound Description Partial thickness 10/10/20 1621 Shape Oval regular 10/10/20 1621 Condition of Base Granulation;Pink 10/10/20 1621 Condition of Edges Open 10/10/20 1621 Assessment Dry 10/10/20 1621 Drainage Amount None 10/10/20 1621 Wound Odor None 10/10/20 1621 Hortencia-wound Assessment Maceration 10/10/20 1621 Cleansing and Cleansing Agents  Other (comment) 10/10/20 1621 Dressing Type Applied Zinc based paste 10/10/20 1621 Number of days: 0 [REMOVED] Wound Ankle Anterior; Left wound was softball sized on ankle. Wound was drained by MD this morning. Applied dressing. (Removed) Dressing Status Intact 09/24/20 0700 Dressing Type Gauze 09/24/20 0700 Number of days: 1 PLAN Skin Care & Pressure Relief Recommendations Speciality bed Waffle Overlay Minimize layers of linen Turn/reposition approximately every 2 hours Pillow wedges Manage incontinence Promote continence; Skin Protective lotion/cream to buttocks and sacrum daily and as needed with incontinence care Offload heels pillows Physician/Provider notified: Yes Recommendations: Condom catheter, Zinc Paste, Waffle Overlay. Continue with current orders for LLE wound. Teaching completed with:  
[] Patient          
[] Family member      
[] Caregiver         
[] Nursing 
[] Other Patient/Caregiver Teaching: 
Level of patient/caregiver understanding able to:  
[] Indicates understanding       [] Needs reinforcement 
[] Unsuccessful      [] Verbal Understanding [] Demonstrated understanding       [] No evidence of learning 
[] Refused teaching         [] N/A Electronically signed by Lucretia Garcia RN on 10/10/2020 at 4:35 PM

## 2020-10-10 NOTE — ROUTINE PROCESS
Bedside and Verbal shift change report given to RODRIGUEZ Durham RN (oncoming nurse) by ROWAN Higgins RN (offgoing nurse). Report included the following information SBAR, Kardex, Intake/Output, MAR, Recent Results and Cardiac Rhythm paced.

## 2020-10-11 ENCOUNTER — APPOINTMENT (OUTPATIENT)
Dept: GENERAL RADIOLOGY | Age: 80
DRG: 871 | End: 2020-10-11
Attending: FAMILY MEDICINE
Payer: MEDICARE

## 2020-10-11 LAB
ANION GAP SERPL CALC-SCNC: 5 MMOL/L (ref 5–15)
BUN SERPL-MCNC: 19 MG/DL (ref 6–20)
BUN/CREAT SERPL: 20 (ref 12–20)
CA-I BLD-MCNC: 9.3 MG/DL (ref 8.5–10.1)
CHLORIDE SERPL-SCNC: 101 MMOL/L (ref 97–108)
CO2 SERPL-SCNC: 29 MMOL/L (ref 21–32)
CREAT SERPL-MCNC: 0.93 MG/DL (ref 0.7–1.3)
GLUCOSE BLD STRIP.AUTO-MCNC: 139 MG/DL (ref 65–100)
GLUCOSE BLD STRIP.AUTO-MCNC: 153 MG/DL (ref 65–100)
GLUCOSE BLD STRIP.AUTO-MCNC: 166 MG/DL (ref 65–100)
GLUCOSE BLD STRIP.AUTO-MCNC: 234 MG/DL (ref 65–100)
GLUCOSE SERPL-MCNC: 128 MG/DL (ref 65–100)
PERFORMED BY, TECHID: ABNORMAL
POTASSIUM SERPL-SCNC: 4.2 MMOL/L (ref 3.5–5.1)
SODIUM SERPL-SCNC: 135 MMOL/L (ref 136–145)

## 2020-10-11 PROCEDURE — 74011250637 HC RX REV CODE- 250/637: Performed by: HOSPITALIST

## 2020-10-11 PROCEDURE — 74011250637 HC RX REV CODE- 250/637: Performed by: INTERNAL MEDICINE

## 2020-10-11 PROCEDURE — 74011636637 HC RX REV CODE- 636/637: Performed by: FAMILY MEDICINE

## 2020-10-11 PROCEDURE — 36415 COLL VENOUS BLD VENIPUNCTURE: CPT

## 2020-10-11 PROCEDURE — 71046 X-RAY EXAM CHEST 2 VIEWS: CPT

## 2020-10-11 PROCEDURE — 87635 SARS-COV-2 COVID-19 AMP PRB: CPT

## 2020-10-11 PROCEDURE — 82962 GLUCOSE BLOOD TEST: CPT

## 2020-10-11 PROCEDURE — 74011250636 HC RX REV CODE- 250/636: Performed by: HOSPITALIST

## 2020-10-11 PROCEDURE — 80048 BASIC METABOLIC PNL TOTAL CA: CPT

## 2020-10-11 PROCEDURE — 74011250637 HC RX REV CODE- 250/637: Performed by: FAMILY MEDICINE

## 2020-10-11 PROCEDURE — 74011636637 HC RX REV CODE- 636/637: Performed by: HOSPITALIST

## 2020-10-11 PROCEDURE — 65270000029 HC RM PRIVATE

## 2020-10-11 RX ORDER — LEVOFLOXACIN 500 MG/1
500 TABLET, FILM COATED ORAL EVERY 24 HOURS
Status: DISCONTINUED | OUTPATIENT
Start: 2020-10-11 | End: 2020-10-16 | Stop reason: HOSPADM

## 2020-10-11 RX ORDER — INSULIN GLARGINE 100 [IU]/ML
9 INJECTION, SOLUTION SUBCUTANEOUS
Status: DISCONTINUED | OUTPATIENT
Start: 2020-10-11 | End: 2020-10-16 | Stop reason: HOSPADM

## 2020-10-11 RX ORDER — ATORVASTATIN CALCIUM 20 MG/1
20 TABLET, FILM COATED ORAL
Status: DISCONTINUED | OUTPATIENT
Start: 2020-10-11 | End: 2020-10-16 | Stop reason: HOSPADM

## 2020-10-11 RX ORDER — DOCUSATE SODIUM 100 MG/1
100 CAPSULE, LIQUID FILLED ORAL
Status: DISCONTINUED | OUTPATIENT
Start: 2020-10-11 | End: 2020-10-16 | Stop reason: HOSPADM

## 2020-10-11 RX ADMIN — DRONABINOL 5 MG: 2.5 CAPSULE ORAL at 16:16

## 2020-10-11 RX ADMIN — OXYCODONE HYDROCHLORIDE 5 MG: 5 TABLET ORAL at 22:10

## 2020-10-11 RX ADMIN — INSULIN GLARGINE 9 UNITS: 100 INJECTION, SOLUTION SUBCUTANEOUS at 22:09

## 2020-10-11 RX ADMIN — MULTIVITAMIN TABLET 1 TABLET: TABLET at 08:58

## 2020-10-11 RX ADMIN — HEPARIN SODIUM 5000 UNITS: 10000 INJECTION, SOLUTION INTRAVENOUS; SUBCUTANEOUS at 22:10

## 2020-10-11 RX ADMIN — DOCUSATE SODIUM 100 MG: 100 CAPSULE, LIQUID FILLED ORAL at 08:58

## 2020-10-11 RX ADMIN — Medication 1 TABLET: at 08:58

## 2020-10-11 RX ADMIN — LEVOTHYROXINE SODIUM 88 MCG: 88 TABLET ORAL at 06:26

## 2020-10-11 RX ADMIN — POTASSIUM CHLORIDE 20 MEQ: 1500 TABLET, EXTENDED RELEASE ORAL at 08:58

## 2020-10-11 RX ADMIN — MIDODRINE HYDROCHLORIDE 10 MG: 5 TABLET ORAL at 16:16

## 2020-10-11 RX ADMIN — HEPARIN SODIUM 5000 UNITS: 10000 INJECTION, SOLUTION INTRAVENOUS; SUBCUTANEOUS at 05:00

## 2020-10-11 RX ADMIN — EZETIMIBE 10 MG: 10 TABLET ORAL at 08:58

## 2020-10-11 RX ADMIN — LEVOFLOXACIN 750 MG: 5 INJECTION, SOLUTION INTRAVENOUS at 16:17

## 2020-10-11 RX ADMIN — FERROUS SULFATE TAB 325 MG (65 MG ELEMENTAL FE) 325 MG: 325 (65 FE) TAB at 08:58

## 2020-10-11 RX ADMIN — MIDODRINE HYDROCHLORIDE 10 MG: 5 TABLET ORAL at 08:58

## 2020-10-11 RX ADMIN — INSULIN LISPRO 3 UNITS: 100 INJECTION, SOLUTION INTRAVENOUS; SUBCUTANEOUS at 17:27

## 2020-10-11 RX ADMIN — RENO CAPS 1 CAPSULE: 100; 1.5; 1.7; 20; 10; 1; 150; 5; 6 CAPSULE ORAL at 08:58

## 2020-10-11 RX ADMIN — ATORVASTATIN CALCIUM 20 MG: 20 TABLET, FILM COATED ORAL at 22:10

## 2020-10-11 RX ADMIN — CLOPIDOGREL BISULFATE 75 MG: 75 TABLET ORAL at 08:58

## 2020-10-11 NOTE — PROGRESS NOTES
CM uploaded negative covid test from 09/24/2020; [id number] [facility] requested a Covid test within the last 3-5 days. CM contacted  to speak to Dr. Germania Chou;  is going to have Dr. Germania Chou page CM. CM spoke to Dr. Germania Chou and she stated that put an order for a Covid test in today. CM will continue to follow patient for discharge planning needs.

## 2020-10-11 NOTE — PROGRESS NOTES
Hospitalist Progress Note           Daily Progress Note: 10/11/2020      Subjective: The patient is seen for follow  up.   He is more awake alert no shortness of breath, cannula oxygen, currently waiting for placement, patient complains of some abdominal pain and losing stool    Problem List:  Problem List as of 10/11/2020 Date Reviewed: 9/23/2020          Codes Class Noted - Resolved    Cardiomyopathy ([facility] [address] ICD-10-CM: I42.9  ICD-9-CM: 425.4  5/17/2019 - Present        Heart failure ([facility] ICD-10-CM: I50.9  ICD-9-CM: 428.9  9/23/2020 - Present        Adult failure to thrive ICD-10-CM: R62.7  ICD-9-CM: 783.7  9/23/2020 - Present        Nausea & vomiting ICD-10-CM: R11.2  ICD-9-CM: 787.01  9/23/2020 - Present        Hypotension ICD-10-CM: I95.9  ICD-9-CM: 458.9  9/23/2020 - Present        Acute exacerbation of CHF (congestive heart failure) (HCC) ICD-10-CM: I50.9  ICD-9-CM: 428.0  9/23/2020 - Present        Ischemic cardiomyopathy ICD-10-CM: I25.5  ICD-9-CM: 414.8  4/23/2019 - Present        Left bundle branch block ICD-10-CM: I44.7  ICD-9-CM: 426.3  4/23/2019 - Present              Medications reviewed  Current Facility-Administered Medications   Medication Dose Route Frequency    oxyCODONE IR (ROXICODONE) tablet 5 mg  5 mg Oral BID PRN    SITagliptin (JANUVIA) tablet tab 50 mg  50 mg Oral DAILY    polyethylene glycol (MIRALAX) packet 17 g  17 g Oral DAILY PRN    docusate sodium (COLACE) capsule 100 mg  100 mg Oral DAILY    insulin glargine (LANTUS) injection 12 Units  12 Units SubCUTAneous QHS    potassium chloride (K-DUR, KLOR-CON) SR tablet 20 mEq  20 mEq Oral DAILY    dronabinoL (MARINOL) capsule 5 mg  5 mg Oral ACB&D    midodrine (PROAMATINE) tablet 10 mg  10 mg Oral TID WITH MEALS    levoFLOXacin (LEVAQUIN) 750 mg in D5W IVPB  750 mg IntraVENous Q48H    heparin porcine injection 5,000 Units  5,000 Units SubCUTAneous Q8H    glucose chewable tablet 16 g  4 Tab Oral PRN    glucagon (GLUCAGEN) injection 1 mg  1 mg IntraMUSCular PRN    dextrose (D50W) injection syrg 12.5-25 g  25-50 mL IntraVENous PRN    atorvastatin (LIPITOR) tablet 40 mg  40 mg Oral QHS    cholecalciferol (VITAMIN D3) (1000 Units /25 mcg) tablet 1 Tab  1,000 Units Oral DAILY    clopidogreL (PLAVIX) tablet 75 mg  75 mg Oral DAILY    ezetimibe (ZETIA) tablet 10 mg  10 mg Oral DAILY    ferrous sulfate tablet 325 mg  1 Tab Oral DAILY WITH BREAKFAST    levothyroxine (SYNTHROID) tablet 88 mcg  88 mcg Oral 6am    pantoprazole (PROTONIX) tablet 40 mg  40 mg Oral ACB    [Held by provider] ranolazine ER (RANEXA) tablet 500 mg  500 mg Oral BID    insulin lispro (HUMALOG) injection   SubCUTAneous AC&HS    dextrose (D50W) injection syrg 12.5-25 g  25-50 mL IntraVENous PRN    multivitamin (ONE A DAY) tablet 1 Tab  1 Tab Oral DAILY    B complex-vitaminC-folic acid (NEPHROCAP) cap  1 Cap Oral DAILY    acetaminophen (TYLENOL) tablet 650 mg  650 mg Oral Q4H PRN       Review of Systems:   A comprehensive review of systems was negative except for that written in the HPI. Related weakness    Objective:   Physical Exam:     Visit Vitals  BP (!) 97/51 (BP 1 Location: Left arm, BP Patient Position: Head of bed elevated (Comment degrees)) Comment (BP Patient Position): 30   Pulse 72   Temp 97.8 °F (36.6 °C)   Resp 20   Ht 5' 6\" (1.676 m)   Wt 95.9 kg (211 lb 6.7 oz)   SpO2 100%   BMI 34.12 kg/m²    O2 Flow Rate (L/min): 2 l/min O2 Device: Nasal cannula    Temp (24hrs), Av.8 °F (36.6 °C), Min:97.5 °F (36.4 °C), Max:98.1 °F (36.7 °C)    No intake/output data recorded. No intake/output data recorded. General:  Alert, cooperative, no distress, appears stated age. Lungs:   Clear to auscultation bilaterally. Chest wall:  No tenderness or deformity. Heart:  Regular rate and rhythm, S1, S2 normal, no murmur, click, rub or gallop. Abdomen:   Soft, non-tender. Bowel sounds normal. No masses,  No organomegaly. Extremities: Extremities normal, atraumatic, no cyanosis or edema. Pulses: 2+ and symmetric all extremities. Skin: Skin color, texture, turgor normal. No rashes or lesions   Neurologic: CNII-XII intact. No gross sensory or motor deficits     Data Review:       Recent Days:  No results for input(s): WBC, HGB, HCT, PLT, HGBEXT, HCTEXT, PLTEXT, HGBEXT, HCTEXT, PLTEXT in the last 72 hours. Recent Labs     10/11/20  0630   *   K 4.2      CO2 29   *   BUN 19   CREA 0.93   CA 9.3     No results for input(s): PH, PCO2, PO2, HCO3, FIO2 in the last 72 hours. 24 Hour Results:  Recent Results (from the past 24 hour(s))   GLUCOSE, POC    Collection Time: 10/10/20  8:49 PM   Result Value Ref Range    Glucose (POC) 220 (H) 65 - 100 mg/dL    Performed by [address] [place], [place] [address], BASIC    Collection Time: 10/11/20  6:30 AM   Result Value Ref Range    Sodium 135 (L) 136 - 145 mmol/L    Potassium 4.2 3.5 - 5.1 mmol/L    Chloride 101 97 - 108 mmol/L    CO2 29 21 - 32 mmol/L    Anion gap 5 5 - 15 mmol/L    Glucose 128 (H) 65 - 100 mg/dL    BUN 19 6 - 20 mg/dL    Creatinine 0.93 0.70 - 1.30 mg/dL    BUN/Creatinine ratio 20 12 - 20      GFR est AA >60 >60 ml/min/1.73m2    GFR est non-AA >60 >60 ml/min/1.73m2    Calcium 9.3 8.5 - 10.1 mg/dL   GLUCOSE, POC    Collection Time: 10/11/20  7:59 AM   Result Value Ref Range    Glucose (POC) 153 (H) 65 - 100 mg/dL    Performed by Quinn IRAHETA    GLUCOSE, POC    Collection Time: 10/11/20 12:50 PM   Result Value Ref Range    Glucose (POC) 166 (H) 65 - 100 mg/dL    Performed by Mahnaz Salazar      Study Result     Chest one view.     AP semierect portable at 1403 on 10/4/2020.     Comparison 10/1/2020.     There has been a remote sternotomy. A central line remains in place. There are  pacer electrodes in the right atrium, right ventricle and coronary sinus.     The heart remains enlarged with calcified plaque in the aorta. Interstitial  edema is redemonstrated. Small bilateral pleural effusions are likely. There is  no pneumothorax or lobar consolidation bilaterally    IMPRESSION  IMPRESSION: Persistent interstitial edema with small bilateral pleural  effusions. Assessment/          Congestive heart failure: Resolved patient currently off Lasix due to hypotension  Hypotension patient on Midodrine  Generalized weakness continue PT OT plans discharge to rehab  Left lower lobe pneumonia: Patient on Levaquin  Plan:  Change antibiotics to Po  Decrease bowel protocol  Decrease insulin and Januvia dose due to hypoglycemia episode  Decrease sedating medicine  Encourage p.o. taking  Follow-up chest x-ray in the morning  Currently waiting for placement  Patient is a full code    Care Plan discussed with: Patient/Family    Total time spent with patient: 30 minutes.     Lina Art MD

## 2020-10-11 NOTE — PROGRESS NOTES
Problem: Pressure Injury - Risk of  Goal: *Prevention of pressure injury  Description: Document John Scale and appropriate interventions in the flowsheet. Outcome: Progressing Towards Goal  Note: Pressure Injury Interventions:  Sensory Interventions: Assess changes in LOC, Assess need for specialty bed, Avoid rigorous massage over bony prominences, Discuss PT/OT consult with provider, Keep linens dry and wrinkle-free, Maintain/enhance activity level, Pressure redistribution bed/mattress (bed type)    Moisture Interventions: Absorbent underpads, Apply protective barrier, creams and emollients, Assess need for specialty bed, Limit adult briefs, Maintain skin hydration (lotion/cream), Minimize layers    Activity Interventions: Pressure redistribution bed/mattress(bed type), PT/OT evaluation    Mobility Interventions: HOB 30 degrees or less, Pressure redistribution bed/mattress (bed type), PT/OT evaluation    Nutrition Interventions: Document food/fluid/supplement intake, Discuss nutritional consult with provider, Offer support with meals,snacks and hydration    Friction and Shear Interventions: Apply protective barrier, creams and emollients, HOB 30 degrees or less, Lift sheet, Lift team/patient mobility team, Minimize layers, Transfer aides:transfer board/Broderick lift/ceiling lift, Transferring/repositioning devices                Problem: Falls - Risk of  Goal: *Absence of Falls  Description: Document Kristi Fall Risk and appropriate interventions in the flowsheet.   Outcome: Progressing Towards Goal  Note: Fall Risk Interventions:  Mobility Interventions: Assess mobility with egress test, Patient to call before getting OOB, PT Consult for assist device competence, PT Consult for mobility concerns    Mentation Interventions: Adequate sleep, hydration, pain control, Bed/chair exit alarm    Medication Interventions: Bed/chair exit alarm, Evaluate medications/consider consulting pharmacy, Patient to call before getting OOB    Elimination Interventions: Call light in reach, Bed/chair exit alarm, Stay With Me (per policy), Patient to call for help with toileting needs

## 2020-10-11 NOTE — PROGRESS NOTES
Patient is being discharged to SNF; [id number] [facility] requested a Covid test. CM contacted [facility] regarding ordering a covid test. CM contacted  to speak to Dr. Markie eKrn to order a covid test.  stated that Dr. Markie Kern could contact CM. CM spoke to Dr. Markie Kern and she is going to order rapid covid test on patient. CM will continue to follow for discharge planning needs.

## 2020-10-12 LAB
ANION GAP SERPL CALC-SCNC: 5 MMOL/L (ref 5–15)
BASOPHILS # BLD: 0.1 K/UL (ref 0–0.1)
BASOPHILS NFR BLD: 1 % (ref 0–1)
BUN SERPL-MCNC: 14 MG/DL (ref 6–20)
BUN/CREAT SERPL: 16 (ref 12–20)
CA-I BLD-MCNC: 9.2 MG/DL (ref 8.5–10.1)
CHLORIDE SERPL-SCNC: 102 MMOL/L (ref 97–108)
CO2 SERPL-SCNC: 27 MMOL/L (ref 21–32)
CREAT SERPL-MCNC: 0.9 MG/DL (ref 0.7–1.3)
DIFFERENTIAL METHOD BLD: ABNORMAL
EOSINOPHIL # BLD: 0.2 K/UL (ref 0–0.4)
EOSINOPHIL NFR BLD: 2 % (ref 0–7)
ERYTHROCYTE [DISTWIDTH] IN BLOOD BY AUTOMATED COUNT: 17.8 % (ref 11.5–14.5)
GLUCOSE BLD STRIP.AUTO-MCNC: 169 MG/DL (ref 65–100)
GLUCOSE BLD STRIP.AUTO-MCNC: 197 MG/DL (ref 65–100)
GLUCOSE BLD STRIP.AUTO-MCNC: 201 MG/DL (ref 65–100)
GLUCOSE BLD STRIP.AUTO-MCNC: 206 MG/DL (ref 65–100)
GLUCOSE BLD STRIP.AUTO-MCNC: 235 MG/DL (ref 65–100)
GLUCOSE SERPL-MCNC: 198 MG/DL (ref 65–100)
HCT VFR BLD AUTO: 31.2 % (ref 36.6–50.3)
HGB BLD-MCNC: 10 G/DL (ref 12.1–17)
IMM GRANULOCYTES # BLD AUTO: 0.4 K/UL (ref 0–0.04)
IMM GRANULOCYTES NFR BLD AUTO: 4 % (ref 0–0.5)
LYMPHOCYTES # BLD: 0.5 K/UL (ref 0.8–3.5)
LYMPHOCYTES NFR BLD: 5 % (ref 12–49)
MCH RBC QN AUTO: 26.7 PG (ref 26–34)
MCHC RBC AUTO-ENTMCNC: 32.1 G/DL (ref 30–36.5)
MCV RBC AUTO: 83.2 FL (ref 80–99)
MONOCYTES # BLD: 1.1 K/UL (ref 0–1)
MONOCYTES NFR BLD: 13 % (ref 5–13)
NEUTS SEG # BLD: 6.9 K/UL (ref 1.8–8)
NEUTS SEG NFR BLD: 79 % (ref 32–75)
NRBC # BLD: 0 K/UL (ref 0–0.01)
NRBC BLD-RTO: 0 PER 100 WBC
PERFORMED BY, TECHID: ABNORMAL
PLATELET # BLD AUTO: 465 K/UL (ref 150–400)
PMV BLD AUTO: 9.8 FL (ref 8.9–12.9)
POTASSIUM SERPL-SCNC: 4.2 MMOL/L (ref 3.5–5.1)
RBC # BLD AUTO: 3.75 M/UL (ref 4.1–5.7)
SARS-COV-2, COV2: NORMAL
SODIUM SERPL-SCNC: 134 MMOL/L (ref 136–145)
TSH SERPL DL<=0.05 MIU/L-ACNC: 2.67 UIU/ML (ref 0.36–3.74)
WBC # BLD AUTO: 8.7 K/UL (ref 4.1–11.1)

## 2020-10-12 PROCEDURE — 82962 GLUCOSE BLOOD TEST: CPT

## 2020-10-12 PROCEDURE — 74011636637 HC RX REV CODE- 636/637: Performed by: FAMILY MEDICINE

## 2020-10-12 PROCEDURE — 85025 COMPLETE CBC W/AUTO DIFF WBC: CPT

## 2020-10-12 PROCEDURE — 65270000029 HC RM PRIVATE

## 2020-10-12 PROCEDURE — 74011250636 HC RX REV CODE- 250/636: Performed by: HOSPITALIST

## 2020-10-12 PROCEDURE — 84439 ASSAY OF FREE THYROXINE: CPT

## 2020-10-12 PROCEDURE — 84443 ASSAY THYROID STIM HORMONE: CPT

## 2020-10-12 PROCEDURE — 74011250637 HC RX REV CODE- 250/637: Performed by: INTERNAL MEDICINE

## 2020-10-12 PROCEDURE — 74011250637 HC RX REV CODE- 250/637: Performed by: FAMILY MEDICINE

## 2020-10-12 PROCEDURE — 74011636637 HC RX REV CODE- 636/637: Performed by: HOSPITALIST

## 2020-10-12 PROCEDURE — 36415 COLL VENOUS BLD VENIPUNCTURE: CPT

## 2020-10-12 PROCEDURE — 80048 BASIC METABOLIC PNL TOTAL CA: CPT

## 2020-10-12 PROCEDURE — 74011250637 HC RX REV CODE- 250/637: Performed by: HOSPITALIST

## 2020-10-12 PROCEDURE — 97530 THERAPEUTIC ACTIVITIES: CPT

## 2020-10-12 RX ORDER — MIDODRINE HYDROCHLORIDE 5 MG/1
10 TABLET ORAL EVERY 6 HOURS
Status: DISCONTINUED | OUTPATIENT
Start: 2020-10-12 | End: 2020-10-16 | Stop reason: HOSPADM

## 2020-10-12 RX ADMIN — MULTIVITAMIN TABLET 1 TABLET: TABLET at 09:17

## 2020-10-12 RX ADMIN — DRONABINOL 5 MG: 2.5 CAPSULE ORAL at 09:18

## 2020-10-12 RX ADMIN — ATORVASTATIN CALCIUM 20 MG: 20 TABLET, FILM COATED ORAL at 21:20

## 2020-10-12 RX ADMIN — DRONABINOL 5 MG: 2.5 CAPSULE ORAL at 17:00

## 2020-10-12 RX ADMIN — INSULIN LISPRO 2 UNITS: 100 INJECTION, SOLUTION INTRAVENOUS; SUBCUTANEOUS at 21:21

## 2020-10-12 RX ADMIN — HEPARIN SODIUM 5000 UNITS: 10000 INJECTION, SOLUTION INTRAVENOUS; SUBCUTANEOUS at 12:09

## 2020-10-12 RX ADMIN — Medication 1 TABLET: at 09:17

## 2020-10-12 RX ADMIN — MIDODRINE HYDROCHLORIDE 10 MG: 5 TABLET ORAL at 23:53

## 2020-10-12 RX ADMIN — HEPARIN SODIUM 5000 UNITS: 10000 INJECTION, SOLUTION INTRAVENOUS; SUBCUTANEOUS at 05:00

## 2020-10-12 RX ADMIN — HEPARIN SODIUM 5000 UNITS: 10000 INJECTION, SOLUTION INTRAVENOUS; SUBCUTANEOUS at 21:20

## 2020-10-12 RX ADMIN — MIDODRINE HYDROCHLORIDE 10 MG: 5 TABLET ORAL at 09:21

## 2020-10-12 RX ADMIN — INSULIN LISPRO 2 UNITS: 100 INJECTION, SOLUTION INTRAVENOUS; SUBCUTANEOUS at 09:18

## 2020-10-12 RX ADMIN — DOCUSATE SODIUM 100 MG: 100 CAPSULE, LIQUID FILLED ORAL at 09:18

## 2020-10-12 RX ADMIN — LEVOFLOXACIN 500 MG: 500 TABLET, FILM COATED ORAL at 17:00

## 2020-10-12 RX ADMIN — POTASSIUM CHLORIDE 20 MEQ: 1500 TABLET, EXTENDED RELEASE ORAL at 09:17

## 2020-10-12 RX ADMIN — CLOPIDOGREL BISULFATE 75 MG: 75 TABLET ORAL at 09:17

## 2020-10-12 RX ADMIN — RENO CAPS 1 CAPSULE: 100; 1.5; 1.7; 20; 10; 1; 150; 5; 6 CAPSULE ORAL at 09:17

## 2020-10-12 RX ADMIN — FERROUS SULFATE TAB 325 MG (65 MG ELEMENTAL FE) 325 MG: 325 (65 FE) TAB at 09:17

## 2020-10-12 RX ADMIN — PANTOPRAZOLE SODIUM 40 MG: 40 TABLET, DELAYED RELEASE ORAL at 09:21

## 2020-10-12 RX ADMIN — INSULIN LISPRO 3 UNITS: 100 INJECTION, SOLUTION INTRAVENOUS; SUBCUTANEOUS at 12:09

## 2020-10-12 RX ADMIN — MIDODRINE HYDROCHLORIDE 10 MG: 5 TABLET ORAL at 12:09

## 2020-10-12 RX ADMIN — MIDODRINE HYDROCHLORIDE 10 MG: 5 TABLET ORAL at 17:00

## 2020-10-12 RX ADMIN — INSULIN GLARGINE 9 UNITS: 100 INJECTION, SOLUTION SUBCUTANEOUS at 21:20

## 2020-10-12 RX ADMIN — EZETIMIBE 10 MG: 10 TABLET ORAL at 09:17

## 2020-10-12 RX ADMIN — LEVOTHYROXINE SODIUM 88 MCG: 88 TABLET ORAL at 06:00

## 2020-10-12 RX ADMIN — INSULIN LISPRO 3 UNITS: 100 INJECTION, SOLUTION INTRAVENOUS; SUBCUTANEOUS at 16:52

## 2020-10-12 NOTE — PROGRESS NOTES
PHYSICAL THERAPY TREATMENT  Patient: Aby Whitley (89 y.o. male)  Date: 10/12/2020  Diagnosis: Heart failure (HCC) [I50.9]  Acute exacerbation of CHF (congestive heart failure) (HCC) [I50.9]  Adult failure to thrive [R62.7]  Nausea & vomiting [R11.2]  Hypotension [I95.9]   <principal problem not specified>       Precautions:    Chart, physical therapy assessment, plan of care and goals were reviewed. ASSESSMENT  Patient continues with skilled PT services and is progressing towards goals. Pt. Semi supine in bed upon arrival and agreeable to therapy session. Session initiated with bed mobility and transfers with Min A. Pt. Slight SOB from sup to sit transfers. Able to perform supine LE TE and seated LE TE. Performed sit to stand from EOB with Min A and transferred to recliner to clean patients bed. Very unstable and anxious with ambulation but able to complete safely. Very SOB after. RECOMMEND dc to SNF. Pt. Left sitting in recliner with callbell and all needs met. Current Level of Function Impacting Discharge (mobility/balance): poor endurance and activity tolerance    Other factors to consider for discharge: PMH         PLAN :  Patient continues to benefit from skilled intervention to address the above impairments. Continue treatment per established plan of care. to address goals. Recommendation for discharge: (in order for the patient to meet his/her long term goals)  Therapy up to 5 days/week in SNF setting    This discharge recommendation:  Has been made in collaboration with the attending provider and/or case management    IF patient discharges home will need the following DME: wheelchair       SUBJECTIVE:   Patient stated Raya ross.     OBJECTIVE DATA SUMMARY:   Critical Behavior:  Neurologic State: Alert  Orientation Level: Oriented X4  Cognition: Appropriate decision making     Functional Mobility Training:  Bed Mobility:  Rolling: Minimum assistance  Supine to Sit: Minimum assistance  Sit to Supine: Minimum assistance  Scooting: Minimum assistance        Transfers:  Sit to Stand: Minimum assistance  Stand to Sit: Minimum assistance  Stand Pivot Transfers: Minimum assistance                          Balance:  Sitting: Intact  Standing: Impaired  Standing - Static: Poor  Standing - Dynamic : Poor  Ambulation/Gait Training:                    Right Side Weight Bearing: Full  Left Side Weight Bearing: Full                                Stairs: Therapeutic Exercises:   Therapeutic Exercises:       EXERCISE   Sets   Reps   Active Active Assist   Passive Self ROM   Comments   Ankle Pumps  2x20 [x] [] [] []    Quad Sets/Glut Sets   [] [] [] []    Hamstring Sets   [] [] [] []    Short Arc Quads   [] [] [] []    Heel Slides  20 [x] [] [] []    Straight Leg Raises   [] [] [] []    Hip abd/add  2x20  [] [] []    Long Arc Quads  2x20 [x] [] [] []    Marching  2x20 [x] [] [] []       [] [] [] []        Pain Ratin/10    Activity Tolerance:   Fair  Please refer to the flowsheet for vital signs taken during this treatment. After treatment patient left in no apparent distress:   Sitting in chair    COMMUNICATION/COLLABORATION:   The patients plan of care was discussed with: Physical therapy assistant.      Norbert Saunders   Time Calculation: 31 mins

## 2020-10-12 NOTE — PROGRESS NOTES
BRI notified by Lieutenant Rondon that patient is now in his co-pay days for SNF and this would require a $176/day co-pay in order to go to SNF. If patient is eligible for Medicaid. Medicaid will retroactively pay this co-pay. CM attempted to have patient screened at beginning of admissions however family was not able to be reached by ESS. CM attempted to contact patient's daughter, Saima Swenson, however no answer and not able to leave a VM. CM contacted patient's other daughter, BODØ [phone], and was able to speak with her. BRI explained the above information, Ms. LOPEZ reported that she does have access to the patient's fiances and she is unsure if he would qualify for Medicaid. CM has asked ESS to attempt again to contact patient's family to screen for Medicaid. CM notified Ms. LOPEZ that if patient does not qualify for Medicaid the family/patient would be responsible for the $176/day copay, BODØ expressed understanding. BRI and BODØ also discussed which SNF they would like patient to go to at [place] as patient was accepted at [facility] and [facility] [place] Ms. LOPEZ stated that her and her siblings has discussed and would like for patient to go to [place] at d/c. DCP is for patient to go to SNF at [facility], awaiting negative COVID test.  CM awaiting ESS to screen patient for Medicaid.

## 2020-10-12 NOTE — PROGRESS NOTES
Problem: Patient Education: Go to Patient Education Activity  Goal: Patient/Family Education  Outcome: Progressing Towards Goal     Problem: Falls - Risk of  Goal: *Absence of Falls  Description: Document Lester Niko Fall Risk and appropriate interventions in the flowsheet.   Outcome: Progressing Towards Goal  Note: Fall Risk Interventions:  Mobility Interventions: Assess mobility with egress test, Patient to call before getting OOB, PT Consult for assist device competence, PT Consult for mobility concerns    Mentation Interventions: Adequate sleep, hydration, pain control, Bed/chair exit alarm    Medication Interventions: Bed/chair exit alarm, Evaluate medications/consider consulting pharmacy, Patient to call before getting OOB    Elimination Interventions: Call light in reach, Bed/chair exit alarm, Stay With Me (per policy), Patient to call for help with toileting needs

## 2020-10-12 NOTE — PROGRESS NOTES
Problem: Pressure Injury - Risk of  Goal: *Prevention of pressure injury  Description: Document John Scale and appropriate interventions in the flowsheet. Outcome: Progressing Towards Goal  Note: Pressure Injury Interventions:  Sensory Interventions: Keep linens dry and wrinkle-free, Float heels, Discuss PT/OT consult with provider, Assess changes in LOC, Check visual cues for pain, Maintain/enhance activity level    Moisture Interventions: Minimize layers, Offer toileting Q_hr, Apply protective barrier, creams and emollients, Absorbent underpads    Activity Interventions: PT/OT evaluation    Mobility Interventions: Assess need for specialty bed, Float heels, HOB 30 degrees or less, PT/OT evaluation    Nutrition Interventions: Document food/fluid/supplement intake    Friction and Shear Interventions: Minimize layers, HOB 30 degrees or less, Foam dressings/transparent film/skin sealants, Apply protective barrier, creams and emollients                Problem: Patient Education: Go to Patient Education Activity  Goal: Patient/Family Education  Outcome: Progressing Towards Goal     Problem: Falls - Risk of  Goal: *Absence of Falls  Description: Document Kristi Fall Risk and appropriate interventions in the flowsheet. Outcome: Progressing Towards Goal  Note: Fall Risk Interventions:  Mobility Interventions: Bed/chair exit alarm, PT Consult for mobility concerns, Patient to call before getting OOB    Mentation Interventions: Bed/chair exit alarm, Adequate sleep, hydration, pain control, Evaluate medications/consider consulting pharmacy    Medication Interventions: Bed/chair exit alarm    Elimination Interventions:  Toileting schedule/hourly rounds, Urinal in reach, Patient to call for help with toileting needs, Call light in reach              Problem: Patient Education: Go to Patient Education Activity  Goal: Patient/Family Education  Outcome: Progressing Towards Goal     Problem: Hypotension  Goal: *Blood pressure within specified parameters  Outcome: Progressing Towards Goal  Goal: *Fluid volume balance  Outcome: Progressing Towards Goal     Problem: Nutrition Deficit  Goal: *Optimize nutritional status  Outcome: Progressing Towards Goal     Problem: Patient Education: Go to Patient Education Activity  Goal: Patient/Family Education  Outcome: Progressing Towards Goal     Problem: Patient Education: Go to Patient Education Activity  Goal: Patient/Family Education  Outcome: Progressing Towards Goal

## 2020-10-12 NOTE — PROGRESS NOTES
Hospitalist Progress Note           Daily Progress Note: 10/12/2020      Subjective: The patient is seen for follow  up.   He is more awake alert no shortness of breath, cannula oxygen, currently waiting for placement, waiting for next COVID-19 testing negative    Problem List:  Problem List as of 10/12/2020 Date Reviewed: 9/23/2020          Codes Class Noted - Resolved    Cardiomyopathy ([facility] [address] ICD-10-CM: I42.9  ICD-9-CM: 425.4  5/17/2019 - Present        Heart failure ([facility] [address] ICD-10-CM: I50.9  ICD-9-CM: 428.9  9/23/2020 - Present        Adult failure to thrive ICD-10-CM: R62.7  ICD-9-CM: 783.7  9/23/2020 - Present        Nausea & vomiting ICD-10-CM: R11.2  ICD-9-CM: 787.01  9/23/2020 - Present        Hypotension ICD-10-CM: I95.9  ICD-9-CM: 458.9  9/23/2020 - Present        Acute exacerbation of CHF (congestive heart failure) (HCC) ICD-10-CM: I50.9  ICD-9-CM: 428.0  9/23/2020 - Present        Ischemic cardiomyopathy ICD-10-CM: I25.5  ICD-9-CM: 414.8  4/23/2019 - Present        Left bundle branch block ICD-10-CM: I44.7  ICD-9-CM: 426.3  4/23/2019 - Present              Medications reviewed  Current Facility-Administered Medications   Medication Dose Route Frequency    SITagliptin (JANUVIA) tablet tab 25 mg  25 mg Oral DAILY    insulin glargine (LANTUS) injection 9 Units  9 Units SubCUTAneous QHS    atorvastatin (LIPITOR) tablet 20 mg  20 mg Oral QHS    docusate sodium (COLACE) capsule 100 mg  100 mg Oral BID PRN    levoFLOXacin (LEVAQUIN) tablet 500 mg  500 mg Oral Q24H    oxyCODONE IR (ROXICODONE) tablet 5 mg  5 mg Oral BID PRN    potassium chloride (K-DUR, KLOR-CON) SR tablet 20 mEq  20 mEq Oral DAILY    dronabinoL (MARINOL) capsule 5 mg  5 mg Oral ACB&D    midodrine (PROAMATINE) tablet 10 mg  10 mg Oral TID WITH MEALS    heparin porcine injection 5,000 Units  5,000 Units SubCUTAneous Q8H    glucose chewable tablet 16 g  4 Tab Oral PRN    glucagon (GLUCAGEN) injection 1 mg  1 mg IntraMUSCular PRN    dextrose (D50W) injection syrg 12.5-25 g  25-50 mL IntraVENous PRN    cholecalciferol (VITAMIN D3) (1000 Units /25 mcg) tablet 1 Tab  1,000 Units Oral DAILY    clopidogreL (PLAVIX) tablet 75 mg  75 mg Oral DAILY    ezetimibe (ZETIA) tablet 10 mg  10 mg Oral DAILY    ferrous sulfate tablet 325 mg  1 Tab Oral DAILY WITH BREAKFAST    levothyroxine (SYNTHROID) tablet 88 mcg  88 mcg Oral 6am    pantoprazole (PROTONIX) tablet 40 mg  40 mg Oral ACB    [Held by provider] ranolazine ER (RANEXA) tablet 500 mg  500 mg Oral BID    insulin lispro (HUMALOG) injection   SubCUTAneous AC&HS    dextrose (D50W) injection syrg 12.5-25 g  25-50 mL IntraVENous PRN    multivitamin (ONE A DAY) tablet 1 Tab  1 Tab Oral DAILY    B complex-vitaminC-folic acid (NEPHROCAP) cap  1 Cap Oral DAILY    acetaminophen (TYLENOL) tablet 650 mg  650 mg Oral Q4H PRN       Review of Systems:   Constitutional: Appetite is not good, denies weight loss, no fever, no chills, no night sweats. + fatigue and weakness  Eye: No recent visual disturbances, no discharge, no double vision  Ear/nose/mouth/throat : No hearing disturbance, no ear pain, no nasal congestion, no sore throat, no trouble swallowing. Respiratory : No trouble breathing, no cough, no  shortness of breath, no hemoptysis, no wheezing  Cardiovascular : No chest pain, no palpitation, no racing of heart, + orthopnea, no paroxysmal nocturnal dyspnea, no peripheral edema  Gastrointestinal : No nausea, no vomiting, no diarrhea, constipation, heartburn, abdominal pain  Genitourinary : No dysuria, no hematuria, no increased frequency,   Lymphatics : No swollen glands -Neck, axillary, inguinal  Endocrine : No excessive thirst, no polyuria no cold intolerance, no heat intolerance. Immunologic : No hives, urticaria, no seasonal allergies,   Musculoskeletal : Complains of generalized weakness all over the body.   No joint swelling, pain, effusion,  no back pain, no neck pain,   Integumentary : No rash, no pruritus, no ecchymosis  Hematology : No petechiae, No easy bruising,  No tendency to bleed easy  Neurology : Denies change in mental status, + abnormal balance, no headache, no confusion, numbness, tingling,  Psychiatric : No depression no anxiety  Objective:   Physical Exam:     Visit Vitals  BP (!) 97/53   Pulse 72   Temp 97.8 °F (36.6 °C)   Resp 20   Ht 5' 6\" (1.676 m)   Wt 95.9 kg (211 lb 6.7 oz)   SpO2 100%   BMI 34.12 kg/m²    O2 Flow Rate (L/min): 2 l/min O2 Device: Nasal cannula    Temp (24hrs), Av.7 °F (36.5 °C), Min:97.5 °F (36.4 °C), Max:97.8 °F (36.6 °C)    No intake/output data recorded. No intake/output data recorded. General:  Alert, cooperative, no distress, appears stated age. Lungs:   Clear to auscultation bilaterally. Chest wall:  No tenderness or deformity. Heart:  Regular rate and rhythm, S1, S2 normal, no murmur, click, rub or gallop. Abdomen:   Soft, non-tender. Bowel sounds normal. No masses,  No organomegaly. Extremities: Extremities normal, atraumatic, no cyanosis or edema. Pulses: 2+ and symmetric all extremities. Skin: Skin color, texture, turgor normal. No rashes or lesions   Neurologic: CNII-XII intact. No gross sensory or motor deficits     Data Review:       Recent Days:  No results for input(s): WBC, HGB, HCT, PLT, HGBEXT, HCTEXT, PLTEXT, HGBEXT, HCTEXT, PLTEXT in the last 72 hours. Recent Labs     10/11/20  0630   *   K 4.2      CO2 29   *   BUN 19   CREA 0.93   CA 9.3     No results for input(s): PH, PCO2, PO2, HCO3, FIO2 in the last 72 hours.     24 Hour Results:  Recent Results (from the past 24 hour(s))   GLUCOSE, POC    Collection Time: 10/11/20 12:50 PM   Result Value Ref Range    Glucose (POC) 166 (H) 65 - 100 mg/dL    Performed by Edward 60, POC    Collection Time: 10/11/20  5:20 PM   Result Value Ref Range    Glucose (POC) 234 (H) 65 - 100 mg/dL    Performed by [employer] GLUCOSE, POC    Collection Time: 10/11/20  9:53 PM   Result Value Ref Range    Glucose (POC) 139 (H) 65 - 100 mg/dL    Performed by Shawn Jensen      Study Result     Chest one view.     AP semierect portable at 0728 on 10/4/2020.     Comparison 10/1/2020.     There has been a remote sternotomy. A central line remains in place. There are  pacer electrodes in the right atrium, right ventricle and coronary sinus.     The heart remains enlarged with calcified plaque in the aorta. Interstitial  edema is redemonstrated. Small bilateral pleural effusions are likely. There is  no pneumothorax or lobar consolidation bilaterally    IMPRESSION  IMPRESSION: Persistent interstitial edema with small bilateral pleural  effusions. Assessment/          Congestive heart failure: Resolved patient currently off Lasix due to hypotension  Hypotension patient on Midodrine, BP still low I will increase to 4 times a day,  Generalized weakness continue PT OT plans discharge to rehab  Left lower lobe pneumonia: Patient on Levaquin, improving    Plan:  Continue IV antibiotics breathing treatment as needed, change to p.o. upon discharge  Decrease sedating medicine  Encourage p.o. taking  Follow-up chest x-ray showed mild pulmonary edema currently waiting for placement  Patient is a full code    Care Plan discussed with: Patient/Family    Total time spent with patient: 30 minutes.     Hernan Peacock MD

## 2020-10-13 LAB
GLUCOSE BLD STRIP.AUTO-MCNC: 152 MG/DL (ref 65–100)
GLUCOSE BLD STRIP.AUTO-MCNC: 167 MG/DL (ref 65–100)
GLUCOSE BLD STRIP.AUTO-MCNC: 218 MG/DL (ref 65–100)
GLUCOSE BLD STRIP.AUTO-MCNC: 90 MG/DL (ref 65–100)
PERFORMED BY, TECHID: ABNORMAL
PERFORMED BY, TECHID: NORMAL
T4 FREE SERPL-MCNC: 2 NG/DL (ref 0.8–1.5)

## 2020-10-13 PROCEDURE — 97530 THERAPEUTIC ACTIVITIES: CPT

## 2020-10-13 PROCEDURE — 65270000029 HC RM PRIVATE

## 2020-10-13 PROCEDURE — 77010033678 HC OXYGEN DAILY

## 2020-10-13 PROCEDURE — 74011250637 HC RX REV CODE- 250/637: Performed by: FAMILY MEDICINE

## 2020-10-13 PROCEDURE — 94760 N-INVAS EAR/PLS OXIMETRY 1: CPT

## 2020-10-13 PROCEDURE — 74011250636 HC RX REV CODE- 250/636: Performed by: HOSPITALIST

## 2020-10-13 PROCEDURE — 74011636637 HC RX REV CODE- 636/637: Performed by: HOSPITALIST

## 2020-10-13 PROCEDURE — 74011250637 HC RX REV CODE- 250/637: Performed by: HOSPITALIST

## 2020-10-13 PROCEDURE — 74011250637 HC RX REV CODE- 250/637: Performed by: INTERNAL MEDICINE

## 2020-10-13 PROCEDURE — 82962 GLUCOSE BLOOD TEST: CPT

## 2020-10-13 RX ORDER — ALOGLIPTIN 25 MG/1
25 TABLET, FILM COATED ORAL DAILY
Status: DISCONTINUED | OUTPATIENT
Start: 2020-10-14 | End: 2020-10-16 | Stop reason: HOSPADM

## 2020-10-13 RX ADMIN — LEVOTHYROXINE SODIUM 88 MCG: 88 TABLET ORAL at 05:34

## 2020-10-13 RX ADMIN — FERROUS SULFATE TAB 325 MG (65 MG ELEMENTAL FE) 325 MG: 325 (65 FE) TAB at 09:44

## 2020-10-13 RX ADMIN — RENO CAPS 1 CAPSULE: 100; 1.5; 1.7; 20; 10; 1; 150; 5; 6 CAPSULE ORAL at 09:45

## 2020-10-13 RX ADMIN — MULTIVITAMIN TABLET 1 TABLET: TABLET at 09:44

## 2020-10-13 RX ADMIN — Medication 1 TABLET: at 09:45

## 2020-10-13 RX ADMIN — INSULIN LISPRO 3 UNITS: 100 INJECTION, SOLUTION INTRAVENOUS; SUBCUTANEOUS at 13:28

## 2020-10-13 RX ADMIN — DRONABINOL 5 MG: 2.5 CAPSULE ORAL at 09:44

## 2020-10-13 RX ADMIN — HEPARIN SODIUM 5000 UNITS: 10000 INJECTION, SOLUTION INTRAVENOUS; SUBCUTANEOUS at 05:35

## 2020-10-13 RX ADMIN — MIDODRINE HYDROCHLORIDE 10 MG: 5 TABLET ORAL at 18:21

## 2020-10-13 RX ADMIN — MIDODRINE HYDROCHLORIDE 10 MG: 5 TABLET ORAL at 05:34

## 2020-10-13 RX ADMIN — DOCUSATE SODIUM 100 MG: 100 CAPSULE, LIQUID FILLED ORAL at 09:44

## 2020-10-13 RX ADMIN — SITAGLIPTIN 25 MG: 25 TABLET, FILM COATED ORAL at 09:43

## 2020-10-13 RX ADMIN — LEVOFLOXACIN 500 MG: 500 TABLET, FILM COATED ORAL at 18:21

## 2020-10-13 RX ADMIN — MIDODRINE HYDROCHLORIDE 10 MG: 5 TABLET ORAL at 13:28

## 2020-10-13 RX ADMIN — PANTOPRAZOLE SODIUM 40 MG: 40 TABLET, DELAYED RELEASE ORAL at 09:49

## 2020-10-13 RX ADMIN — HEPARIN SODIUM 5000 UNITS: 10000 INJECTION, SOLUTION INTRAVENOUS; SUBCUTANEOUS at 13:28

## 2020-10-13 RX ADMIN — EZETIMIBE 10 MG: 10 TABLET ORAL at 09:45

## 2020-10-13 RX ADMIN — CLOPIDOGREL BISULFATE 75 MG: 75 TABLET ORAL at 09:44

## 2020-10-13 RX ADMIN — INSULIN LISPRO 2 UNITS: 100 INJECTION, SOLUTION INTRAVENOUS; SUBCUTANEOUS at 09:43

## 2020-10-13 RX ADMIN — POTASSIUM CHLORIDE 20 MEQ: 1500 TABLET, EXTENDED RELEASE ORAL at 09:44

## 2020-10-13 RX ADMIN — DRONABINOL 5 MG: 2.5 CAPSULE ORAL at 18:20

## 2020-10-13 RX ADMIN — INSULIN LISPRO 2 UNITS: 100 INJECTION, SOLUTION INTRAVENOUS; SUBCUTANEOUS at 18:20

## 2020-10-13 RX ADMIN — HEPARIN SODIUM 5000 UNITS: 10000 INJECTION, SOLUTION INTRAVENOUS; SUBCUTANEOUS at 21:20

## 2020-10-13 RX ADMIN — ATORVASTATIN CALCIUM 20 MG: 20 TABLET, FILM COATED ORAL at 21:20

## 2020-10-13 NOTE — PROGRESS NOTES
[facility] notified by Rufina Pratt that they do not have any beds at this time and could admit patient later this week. CM attempted to contact patient's daughter, Yessenia Palmer, to discuss DCP as patient could likely admit to [facility] sooner, however no answer at this time, CM left detailed VM requesting a call back ASAP. UAI was completed and faxed to [facility]. [facility] anticipates to receive a T number for patient tomorrow 10/14/2020. CM continues to follow. CM contacted by Yessenia Palmer, she stated she would much prefer [facility] and not [facility] as [facility] has many negative reviews. CM explained that if [facility] continues to not have a bed past the time they stated they would, we would have to look at other placement, Yessenia Palmer expressed understanding, [facility] has communicated with Colleen via ariel.

## 2020-10-13 NOTE — PROGRESS NOTES
Hospitalist Progress Note           Daily Progress Note: 10/13/2020      Subjective: The patient is seen for follow  up. Patient is alert, lucid, cooperative. Blood pressure still running on the low side. Denies any chest pain shortness of breath or abdominal pain. Still maintains no appetite. States anticipation for skilled nursing facility \"once my Medicaid goes through\".     Problem List:  Problem List as of 10/13/2020 Date Reviewed: 9/23/2020          Codes Class Noted - Resolved    Cardiomyopathy ([facility] ICD-10-CM: I42.9  ICD-9-CM: 425.4  5/17/2019 - Present        Heart failure ([facility] ICD-10-CM: I50.9  ICD-9-CM: 428.9  9/23/2020 - Present        Adult failure to thrive ICD-10-CM: R62.7  ICD-9-CM: 783.7  9/23/2020 - Present        Nausea & vomiting ICD-10-CM: R11.2  ICD-9-CM: 787.01  9/23/2020 - Present        Hypotension ICD-10-CM: I95.9  ICD-9-CM: 458.9  9/23/2020 - Present        Acute exacerbation of CHF (congestive heart failure) ([facility] ICD-10-CM: I50.9  ICD-9-CM: 428.0  9/23/2020 - Present        Ischemic cardiomyopathy ICD-10-CM: I25.5  ICD-9-CM: 414.8  4/23/2019 - Present        Left bundle branch block ICD-10-CM: I44.7  ICD-9-CM: 426.3  4/23/2019 - Present              Medications reviewed  Current Facility-Administered Medications   Medication Dose Route Frequency    [START ON 10/14/2020] alogliptin (NESINA) tablet 25 mg  25 mg Oral DAILY    midodrine (PROAMATINE) tablet 10 mg  10 mg Oral Q6H    insulin glargine (LANTUS) injection 9 Units  9 Units SubCUTAneous QHS    atorvastatin (LIPITOR) tablet 20 mg  20 mg Oral QHS    docusate sodium (COLACE) capsule 100 mg  100 mg Oral BID PRN    levoFLOXacin (LEVAQUIN) tablet 500 mg  500 mg Oral Q24H    oxyCODONE IR (ROXICODONE) tablet 5 mg  5 mg Oral BID PRN    potassium chloride (K-DUR, KLOR-CON) SR tablet 20 mEq  20 mEq Oral DAILY    dronabinoL (MARINOL) capsule 5 mg  5 mg Oral ACB&D    heparin porcine injection 5,000 Units  5,000 Units SubCUTAneous Q8H    glucose chewable tablet 16 g  4 Tab Oral PRN    glucagon (GLUCAGEN) injection 1 mg  1 mg IntraMUSCular PRN    dextrose (D50W) injection syrg 12.5-25 g  25-50 mL IntraVENous PRN    cholecalciferol (VITAMIN D3) (1000 Units /25 mcg) tablet 1 Tab  1,000 Units Oral DAILY    clopidogreL (PLAVIX) tablet 75 mg  75 mg Oral DAILY    ezetimibe (ZETIA) tablet 10 mg  10 mg Oral DAILY    ferrous sulfate tablet 325 mg  1 Tab Oral DAILY WITH BREAKFAST    levothyroxine (SYNTHROID) tablet 88 mcg  88 mcg Oral 6am    pantoprazole (PROTONIX) tablet 40 mg  40 mg Oral ACB    [Held by provider] ranolazine ER (RANEXA) tablet 500 mg  500 mg Oral BID    insulin lispro (HUMALOG) injection   SubCUTAneous AC&HS    dextrose (D50W) injection syrg 12.5-25 g  25-50 mL IntraVENous PRN    multivitamin (ONE A DAY) tablet 1 Tab  1 Tab Oral DAILY    B complex-vitaminC-folic acid (NEPHROCAP) cap  1 Cap Oral DAILY    acetaminophen (TYLENOL) tablet 650 mg  650 mg Oral Q4H PRN       Review of Systems:   Constitutional: Appetite remains poor, denies weight loss, no fever, no chills, no night sweats. +weakness  Eye: No recent visual disturbances, no discharge, no double vision  Ear/nose/mouth/throat : No hearing disturbance, no ear pain, no nasal congestion, no sore throat, no trouble swallowing. Respiratory : no  shortness of breath, no hemoptysis, no wheezing  Cardiovascular : No chest pain, no palpitation, no palpitation, + orthopnea, no paroxysmal nocturnal dyspnea, no peripheral edema  Gastrointestinal : No nausea, no vomiting, no diarrhea, constipation, heartburn, abdominal pain  Genitourinary : No dysuria, no hematuria, no increased frequency,   Endocrine : No excessive thirst, no polyuria no cold intolerance, no heat intolerance.   Immunologic : No hives, urticaria, no seasonal allergies,   Musculoskeletal : Complains of generalized weakness,  No joint swelling, pain, effusion,  no back pain, no neck pain,   Integumentary : No rash, no pruritus, no ecchymosis  Hematology : No petechiae, No easy bruising,  No tendency to bleed easy  Neurology : Denies change in mental status, + abnormal balance, no headache, no confusion, numbness, tingling,  Psychiatric : No depression no anxiety  Objective:   Physical Exam:     Visit Vitals  BP (!) 84/55   Pulse 74   Temp 97.7 °F (36.5 °C)   Resp 20   Ht 5' 6\" (1.676 m)   Wt 95.9 kg (211 lb 6.7 oz)   SpO2 96%   BMI 34.12 kg/m²    O2 Flow Rate (L/min): 2 l/min O2 Device: Nasal cannula    Temp (24hrs), Av.1 °F (36.7 °C), Min:97.6 °F (36.4 °C), Max:98.7 °F (37.1 °C)    No intake/output data recorded. 10/11 1901 - 10/13 0700  In: -   Out: 400 [Urine:400]    General:  Alert, cooperative, no distress, appears stated age. Lungs:   Clear to auscultation bilaterally. Chest wall:  No tenderness or deformity. Heart:  Regular rate and rhythm, S1, S2 normal, no murmur, click, rub or gallop. Abdomen:   Soft, non-tender. Bowel sounds normal. No masses,  No organomegaly. Extremities: Extremities normal, atraumatic, no cyanosis or edema. Moving all extremities, generally weak   Pulses: 2+ and symmetric all extremities. Skin: Skin color, texture, turgor normal. No rashes or lesions   Neurologic: CNII-XII intact. No gross sensory or motor deficits     Data Review:       Recent Days:  Recent Labs     10/12/20  1015   WBC 8.7   HGB 10.0*   HCT 31.2*   *     Recent Labs     10/12/20  1015 10/11/20  0630   * 135*   K 4.2 4.2    101   CO2 27 29   * 128*   BUN 14 19   CREA 0.90 0.93   CA 9.2 9.3     No results for input(s): PH, PCO2, PO2, HCO3, FIO2 in the last 72 hours.     24 Hour Results:  Recent Results (from the past 24 hour(s))   GLUCOSE, POC    Collection Time: 10/12/20  3:17 PM   Result Value Ref Range    Glucose (POC) 235 (H) 65 - 100 mg/dL    Performed by Myesha Samano, POC    Collection Time: 10/12/20  8:22 PM   Result Value Ref Range    Glucose (POC) 206 (H) 65 - 100 mg/dL    Performed by Paula Goldsmith, POC    Collection Time: 10/13/20  8:37 AM   Result Value Ref Range    Glucose (POC) 167 (H) 65 - 100 mg/dL    Performed by Mark Medrano    GLUCOSE, POC    Collection Time: 10/13/20 11:26 AM   Result Value Ref Range    Glucose (POC) 218 (H) 65 - 100 mg/dL    Performed by Basilio Shah            Assessment/          --Congestive heart failure: Resolved patient currently off Lasix due to hypotension  --Hypotension patient on Midodrine, Continue, BB/Ace bp rx on hold 2/2 bp.(pta metop xl 25 mg daily, lisinopril 2.5mg daily)  --Generalized weakness continue PT OT plans discharge to rehab  --Left lower lobe pneumonia: Patient on Levaquin, improving      Plan:  Continue LEVAQUIN  Continue midodrine  Will stop marinol, add mirtazapine nightly for appetite  Remains off BB/Ace 2/2 BB, monitor to resume. Awaiting snf placement- cm appreciated. Patient is a full code    Care Plan discussed with: Patient/Family    Total time spent with patient: 25 minutes.     Piper Olvera MD

## 2020-10-13 NOTE — PROGRESS NOTES
OCCUPATIONAL THERAPY RE-EVALUATION  Patient: Piyush Michel (17 y.o. male)  Date: 10/13/2020  Diagnosis: Heart failure ([facility] [address] [I50.9]  Acute exacerbation of CHF (congestive heart failure) ([facility] [address] [I50.9]  Adult failure to thrive [R62.7]  Nausea & vomiting [R11.2]  Hypotension [I95.9]   <principal problem not specified>       Precautions: Fall    Chart, occupational therapy assessment, plan of care, and goals were reviewed. ASSESSMENT  Based on the objective data described below, pt presents with deficits in B UE AROM/strength, functional activity tolerance, and static/dynamic sitting and standing balance impacting overall ADL performance and functional transfers/mobility. Pt was initially evaluated and placed on OT caseload 9/29/20 and has been participating with therapy and working towards goals. Pt currently requiring mod A x2 for bed mobility and functional transfers, max A to don socks bed level, and setup assist/supervision for container management EOB. Pt is pleasant and willingly participates with therapies; would benefit from continued skilled OT to address impairments and improve IND and safety with ADLs and functional transfers upon d/c. Other factors to consider for discharge: family support, time since onset         PLAN :  Recommendations and Planned Interventions: self care training, functional mobility training, therapeutic exercise, balance training, therapeutic activities, endurance activities, and patient education    Frequency/Duration: Patient will be followed by occupational therapy 5 times a week to address goals.     Recommend with staff: Encourage EOB ADLs with staff as tolerated    Recommend next OT session: toilet transfer    Recommendation for discharge: (in order for the patient to meet his/her long term goals)  SNF    This discharge recommendation:  Has been made in collaboration with the attending provider and/or case management       SUBJECTIVE:   Patient stated I can't stand for long. I get really tired.     OBJECTIVE DATA SUMMARY:   Hospital course since last seen and reason for reevaluation: Pt was initially evaluated and placed on OT caseload 9/29/20 and has been participating with therapy and working towards goals. Pt currently requiring mod A x2 for bed mobility and functional transfers, max A to don socks bed level, and setup assist/supervision for container management EOB. Pt due for re-assessment today to update goals and POC as needed. Cognitive/Behavioral Status:  Neurologic State: Alert  Orientation Level: Oriented X4  Cognition: Follows commands; Appropriate decision making      Hearing: Auditory  Auditory Impairment: None    Vision/Perceptual:       WFL    Range of Motion:  B UE AROM generally decreased, functional    Strength:  B UE strength generally decreased, functional      LUE Strength  Observation: grossly observed to be 3+/5    Coordination:     Fine Motor Skills-Upper: Left Intact; Right Intact    Gross Motor Skills-Upper: Left Intact; Right Intact    Tone & Sensation:  [facility]    Functional Mobility and Transfers for ADLs:  Bed Mobility:  Rolling: Moderate assistance;Assist x2  Supine to Sit: Moderate assistance;Assist x2  Sit to Supine: Moderate assistance;Assist x2  Scooting: Moderate assistance;Assist x2    Transfers:  Sit to Stand: Moderate assistance;Assist x2          Balance:  Sitting: Intact; Without support  Standing: Impaired; With support    ADL Intervention and task modifications:  Feeding  Container Management: Set-up; Supervision    Lower Body Dressing Assistance  Socks: Maximum assistance  Position Performed: Long sitting on bed    Toileting  Bowel Hygiene: Total assistance (dependent)    Therapeutic Exercises:   Pt would benefit from continued B UE HEP to improve AROM/strength during hospital stay.     Functional Measure:    [address] [facility] [address] [address] AM-PACTM \"6 Clicks\"                                                       Daily Activity Inpatient Short Form  How much help from another person does the patient currently need. .. Total; A Lot A Little None   1. Putting on and taking off regular lower body clothing? []  1 [x]  2 []  3 []  4   2. Bathing (including washing, rinsing, drying)? []  1 [x]  2 []  3 []  4   3. Toileting, which includes using toilet, bedpan or urinal? [x] 1 []  2 []  3 []  4   4. Putting on and taking off regular upper body clothing? []  1 []  2 [x]  3 []  4   5. Taking care of personal grooming such as brushing teeth? []  1 []  2 [x]  3 []  4   6. Eating meals? []  1 []  2 [x]  3 []  4   © 2007, Trustees of [facility] [employer], under license to [employer]. All rights reserved     Score: 14/24     Interpretation of Tool:  Represents clinically-significant functional categories (i.e. Activities of daily living). Percentage of Impairment CH    0%   CI    1-19% CJ    20-39% CK    40-59% CL    60-79% CM    80-99% CN     100%   AMPA  Score 6-24 24 23 20-22 15-19 10-14 7-9 6        Pain:  0/10    Activity Tolerance:   Fair and requires rest breaks    After treatment patient left in no apparent distress:   Supine in bed, Call bell within reach, and Bed / chair alarm activated    COMMUNICATION/COLLABORATION:   The patients plan of care was discussed with: Physical therapist and Registered nurse. OT/PT sessions occurred together for increased patient and clinician safety as pt requires A of 2 for mobility at this time.       Adrian Flores  Time Calculation: 29 mins   Problem: Self Care Deficits Care Plan (Adult)  Goal: *Acute Goals and Plan of Care (Insert Text)  Description: Pt will be SBA sup <> sit in prep for EOB ADLs  Pt will be Mod I grooming sitting EOB  Pt will be Mod I LE dressing sitting EOB/long sit  Pt will be SBA sit <>  prep for toileting LRAD  Pt will be SBA toileting/toilet transfer/cloth mgmt LRAD  Pt will be IND following UE HEP in prep for self care tasks     Outcome: Progressing Towards Goal

## 2020-10-13 NOTE — PROGRESS NOTES
Problem: Mobility Impaired (Adult and Pediatric)  Goal: *Acute Goals and Plan of Care (Insert Text)  Description: Pt will be I with LE HEP in 7 days. Pt will perform bed mobility with min A x1 in 7 days. Pt will perform transfers with min Ax1 in 7 days. Pt will amb 10-25 feet with LRAD safely with mod I in 7 days. Outcome: Progressing Towards Goal  PHYSICAL THERAPY REEVALUATION  Patient: Palma Jaime (46 y.o. male)  Date: 10/13/2020  Primary Diagnosis: Heart failure ([address] [I50.9]  Acute exacerbation of CHF (congestive heart failure) ([facility]) [I50.9]  Adult failure to thrive [R62.7]  Nausea & vomiting [R11.2]  Hypotension [I95.9]        Precautions: fall         ASSESSMENT  Pt seen for reassessment today. Based on the objective data described below, the patient presents with decreased LE strength, decreased endurance with mobility, difficulty with bed mobility and transfers requiring mod A x 2. Pt sat EOB x 15 min performing exercise and attempted sit to stand transfer x 2 reps and stood for about 1-2 min, to get cleaned up and get warren pads changed; however, pt reported it felt like his legs were going to give out on him and his back felt heavy. Pt was very SOB with activity; however, O2 sats stayed at 98%. Pt required cueing in pursed lip breathing technique and to slow down his breathing with activity. Other factors to consider for discharge: impaired mobility,poor endurance     Patient will benefit from skilled therapy intervention to address the above noted impairments. PLAN :  Recommendations and Planned Interventions: bed mobility training, transfer training, gait training, therapeutic exercises, patient and family training/education, and therapeutic activities      Frequency/Duration: Patient will be followed by physical therapy:  5 times a week to address goals.     Recommendation for discharge: (in order for the patient to meet his/her long term goals)  Therapy up to 5 days/week in SNF setting    This discharge recommendation:  Has been made in collaboration with the attending provider and/or case management    Equipment recommendations for successful discharge (if) home: none         SUBJECTIVE:   Patient stated I am feeling ok today.     OBJECTIVE DATA SUMMARY:   HISTORY:    Past Medical History:   Diagnosis Date    Diabetes ([address]     Essential hypertension     Hyperlipidemia     Myocardial infarction [facility])     Pacemaker      Past Surgical History:   Procedure Laterality Date    HX CORONARY ARTERY BYPASS GRAFT      NM INSJ/RPLCMT PERM DFB W/TRNSVNS LDS 1/DUAL CHMBR N/A 5/17/2019    pm-> ICD upgrade, RV lead, [employer] performed by Yolanda Meyer MD at [address] course since last seen and reason for reevaluation: has been 7 days since last reevaluation    Personal factors and/or comorbidities impacting plan of care: impaired mobility, poor endurance    [address]: [address] [place] [address] Name: [facility]  # Steps to Enter: 0  One/Two Story Residence: One story  Living Alone: No  Support Systems: Family member(s)(Daugher and granddaughter)  Patient Expects to be Discharged to[de-identified] Skilled nursing facility  Current DME Used/Available at Home: Lysbeth Leonard, rollator, Other (comment)(sock aide)    EXAMINATION/PRESENTATION/DECISION MAKING:   Critical Behavior:  Neurologic State: Alert, Appropriate for age  Orientation Level: Oriented X4  Cognition: Appropriate decision making, Appropriate for age attention/concentration, Appropriate safety awareness, Follows commands     Hearing: Auditory  Auditory Impairment: None  Range Of Motion:   AROM WFL for LE's                       Strength:    Grossly 3/5 throughout LE's                          Functional Mobility:  Bed Mobility:  Rolling: Moderate assistance;Assist x2  Supine to Sit: Moderate assistance;Assist x2  Sit to Supine:  Moderate assistance;Assist x2  Scooting: Moderate assistance;Assist x2  Transfers:  Sit to Stand: Moderate assistance;Assist x2  Stand to Sit: Moderate assistance;Assist x2    Once standing, pt attempted standing marches but pt unable to do. Balance:   Sitting: Intact; Without support  Standing: Impaired; With support  Ambulation/Gait Training:                                                            Therapeutic Exercises:   Seated marches, LAQ, ankle pumps x 10 each         Pain Ratin/10    Activity Tolerance:   Fair, requires rest breaks, and observed SOB with activity  Please refer to the flowsheet for vital signs taken during this treatment. After treatment patient left in no apparent distress:   Supine in bed, Call bell within reach, and Side rails x 3    COMMUNICATION/EDUCATION:   The patients plan of care was discussed with: Physical therapy assistant, Registered nurse, and Case management. Patient/family agree to work toward stated goals and plan of care. PT/OT sessions occurred together for increased safety of pt and clinician.      Thank you for this referral.  Ritesh Howell   Time Calculation: 24 mins

## 2020-10-13 NOTE — PROGRESS NOTES
Problem: Pressure Injury - Risk of  Goal: *Prevention of pressure injury  Description: Document John Scale and appropriate interventions in the flowsheet. Outcome: Progressing Towards Goal  Note: Pressure Injury Interventions:  Sensory Interventions: Assess changes in LOC, Float heels, Keep linens dry and wrinkle-free, Minimize linen layers    Moisture Interventions: Absorbent underpads, Minimize layers    Activity Interventions: PT/OT evaluation    Mobility Interventions: Float heels, PT/OT evaluation    Nutrition Interventions: Offer support with meals,snacks and hydration    Friction and Shear Interventions: Minimize layers, Transferring/repositioning devices                Problem: Falls - Risk of  Goal: *Absence of Falls  Description: Document Kristi Fall Risk and appropriate interventions in the flowsheet.   Outcome: Progressing Towards Goal  Note: Fall Risk Interventions:  Mobility Interventions: Bed/chair exit alarm, OT consult for ADLs, Patient to call before getting OOB, Utilize walker, cane, or other assistive device    Mentation Interventions: Adequate sleep, hydration, pain control, Bed/chair exit alarm    Medication Interventions: Bed/chair exit alarm, Patient to call before getting OOB    Elimination Interventions: Bed/chair exit alarm, Call light in reach, Patient to call for help with toileting needs, Toileting schedule/hourly rounds              Problem: Hypotension  Goal: *Blood pressure within specified parameters  Outcome: Progressing Towards Goal     Problem: Patient Education: Go to Patient Education Activity  Goal: Patient/Family Education  Outcome: Progressing Towards Goal     Problem: Patient Education: Go to Patient Education Activity  Goal: Patient/Family Education  Outcome: Progressing Towards Goal

## 2020-10-14 LAB
ATRIAL RATE: 85 BPM
CALCULATED R AXIS, ECG10: -75 DEGREES
CALCULATED T AXIS, ECG11: 95 DEGREES
DIAGNOSIS, 93000: NORMAL
GLUCOSE BLD STRIP.AUTO-MCNC: 130 MG/DL (ref 65–100)
GLUCOSE BLD STRIP.AUTO-MCNC: 172 MG/DL (ref 65–100)
GLUCOSE BLD STRIP.AUTO-MCNC: 179 MG/DL (ref 65–100)
PERFORMED BY, TECHID: ABNORMAL
Q-T INTERVAL, ECG07: 456 MS
QRS DURATION, ECG06: 152 MS
QTC CALCULATION (BEZET), ECG08: 525 MS
SARS-COV-2, COV2NT: NOT DETECTED
TROPONIN I SERPL-MCNC: <0.05 NG/ML
VENTRICULAR RATE, ECG03: 80 BPM

## 2020-10-14 PROCEDURE — 97530 THERAPEUTIC ACTIVITIES: CPT

## 2020-10-14 PROCEDURE — 36415 COLL VENOUS BLD VENIPUNCTURE: CPT

## 2020-10-14 PROCEDURE — 74011636637 HC RX REV CODE- 636/637: Performed by: HOSPITALIST

## 2020-10-14 PROCEDURE — 74011250637 HC RX REV CODE- 250/637: Performed by: FAMILY MEDICINE

## 2020-10-14 PROCEDURE — 74011250637 HC RX REV CODE- 250/637: Performed by: INTERNAL MEDICINE

## 2020-10-14 PROCEDURE — 84484 ASSAY OF TROPONIN QUANT: CPT

## 2020-10-14 PROCEDURE — 74011250637 HC RX REV CODE- 250/637: Performed by: HOSPITALIST

## 2020-10-14 PROCEDURE — 82962 GLUCOSE BLOOD TEST: CPT

## 2020-10-14 PROCEDURE — 94760 N-INVAS EAR/PLS OXIMETRY 1: CPT

## 2020-10-14 PROCEDURE — 74011636637 HC RX REV CODE- 636/637: Performed by: FAMILY MEDICINE

## 2020-10-14 PROCEDURE — 77010033678 HC OXYGEN DAILY

## 2020-10-14 PROCEDURE — 74011250636 HC RX REV CODE- 250/636: Performed by: HOSPITALIST

## 2020-10-14 PROCEDURE — 74011250637 HC RX REV CODE- 250/637: Performed by: NURSE PRACTITIONER

## 2020-10-14 PROCEDURE — 65270000029 HC RM PRIVATE

## 2020-10-14 PROCEDURE — 93005 ELECTROCARDIOGRAM TRACING: CPT

## 2020-10-14 RX ORDER — PHENAZOPYRIDINE HYDROCHLORIDE 95 MG/1
95 TABLET ORAL 3 TIMES DAILY
Status: COMPLETED | OUTPATIENT
Start: 2020-10-14 | End: 2020-10-16

## 2020-10-14 RX ADMIN — HEPARIN SODIUM 5000 UNITS: 10000 INJECTION, SOLUTION INTRAVENOUS; SUBCUTANEOUS at 05:50

## 2020-10-14 RX ADMIN — Medication 1 TABLET: at 09:15

## 2020-10-14 RX ADMIN — MIDODRINE HYDROCHLORIDE 10 MG: 5 TABLET ORAL at 18:16

## 2020-10-14 RX ADMIN — MULTIVITAMIN TABLET 1 TABLET: TABLET at 09:15

## 2020-10-14 RX ADMIN — PANTOPRAZOLE SODIUM 40 MG: 40 TABLET, DELAYED RELEASE ORAL at 09:18

## 2020-10-14 RX ADMIN — EZETIMIBE 10 MG: 10 TABLET ORAL at 09:15

## 2020-10-14 RX ADMIN — MIDODRINE HYDROCHLORIDE 10 MG: 5 TABLET ORAL at 05:49

## 2020-10-14 RX ADMIN — HEPARIN SODIUM 5000 UNITS: 10000 INJECTION, SOLUTION INTRAVENOUS; SUBCUTANEOUS at 22:15

## 2020-10-14 RX ADMIN — OXYCODONE HYDROCHLORIDE 5 MG: 5 TABLET ORAL at 14:10

## 2020-10-14 RX ADMIN — MIDODRINE HYDROCHLORIDE 10 MG: 5 TABLET ORAL at 23:16

## 2020-10-14 RX ADMIN — INSULIN LISPRO 2 UNITS: 100 INJECTION, SOLUTION INTRAVENOUS; SUBCUTANEOUS at 12:27

## 2020-10-14 RX ADMIN — CLOPIDOGREL BISULFATE 75 MG: 75 TABLET ORAL at 09:15

## 2020-10-14 RX ADMIN — ACETAMINOPHEN 650 MG: 325 TABLET, FILM COATED ORAL at 15:00

## 2020-10-14 RX ADMIN — ACETAMINOPHEN 650 MG: 325 TABLET, FILM COATED ORAL at 09:18

## 2020-10-14 RX ADMIN — ALOGLIPTIN 25 MG: 25 TABLET, FILM COATED ORAL at 09:15

## 2020-10-14 RX ADMIN — FERROUS SULFATE TAB 325 MG (65 MG ELEMENTAL FE) 325 MG: 325 (65 FE) TAB at 09:18

## 2020-10-14 RX ADMIN — MIDODRINE HYDROCHLORIDE 10 MG: 5 TABLET ORAL at 12:27

## 2020-10-14 RX ADMIN — ATORVASTATIN CALCIUM 20 MG: 20 TABLET, FILM COATED ORAL at 22:15

## 2020-10-14 RX ADMIN — LEVOTHYROXINE SODIUM 88 MCG: 88 TABLET ORAL at 05:49

## 2020-10-14 RX ADMIN — Medication 95 MG: at 23:16

## 2020-10-14 RX ADMIN — HEPARIN SODIUM 5000 UNITS: 10000 INJECTION, SOLUTION INTRAVENOUS; SUBCUTANEOUS at 12:27

## 2020-10-14 RX ADMIN — INSULIN GLARGINE 9 UNITS: 100 INJECTION, SOLUTION SUBCUTANEOUS at 22:15

## 2020-10-14 RX ADMIN — DRONABINOL 5 MG: 2.5 CAPSULE ORAL at 15:01

## 2020-10-14 RX ADMIN — POTASSIUM CHLORIDE 20 MEQ: 1500 TABLET, EXTENDED RELEASE ORAL at 09:00

## 2020-10-14 RX ADMIN — LEVOFLOXACIN 500 MG: 500 TABLET, FILM COATED ORAL at 18:16

## 2020-10-14 RX ADMIN — RENO CAPS 1 CAPSULE: 100; 1.5; 1.7; 20; 10; 1; 150; 5; 6 CAPSULE ORAL at 09:15

## 2020-10-14 NOTE — PROGRESS NOTES
OCCUPATIONAL THERAPY TREATMENT  Patient: Edd Wen (15 y.o. male)  Date: 10/14/2020  Diagnosis: Heart failure ([facility] [I50.9]  Acute exacerbation of CHF (congestive heart failure) ([facility] [I50.9]  Adult failure to thrive [R62.7]  Nausea & vomiting [R11.2]  Hypotension [I95.9]   <principal problem not specified>       Precautions: Fall    Chart, occupational therapy assessment, plan of care, and goals were reviewed. ASSESSMENT  Patient continues with skilled OT services and is progressing towards goals. Pt received supine in bed upon arrival, on O2 via NC, and agreeable to working with therapies. He requires max A to don socks at bed level and min A for bed mobility, supine>sit with good static sitting balance observed. Pt sit><stand from EOB to recliner chair with mod A x2. Total A required for bowel hygiene in standing. Cues required to slow breathing in standing with good understanding demonstrated. Pt continues to present with deficits in generalized strength, functional activity tolerance, static/dynamic standing balance, and generalized pain limiting overall ADL performance, however is is making slow but steady progress towards goals. Pt would benefit from continued skilled OT during hospital stay and at next level of care to address impairments and improve overall IND and safety with ADLs and functional mobility at d/c. Other factors to consider for discharge: family support, DME, time since onset         PLAN :  Patient continues to benefit from skilled intervention to address the above impairments. Continue treatment per established plan of care. to address goals.     Recommend with staff: encourage ADLs at bed level with staff present    Recommend next OT session: toilet transfer    Recommendation for discharge: (in order for the patient to meet his/her long term goals)  SNF    This discharge recommendation:  Has been made in collaboration with the attending provider and/or case management SUBJECTIVE:   Patient stated I have pain all over    OBJECTIVE DATA SUMMARY:   Cognitive/Behavioral Status:  Neurologic State: Alert     Cognition: Follows commands    Functional Mobility and Transfers for ADLs:  Bed Mobility:  Supine to Sit: Minimum assistance  Sit to Supine: Minimum assistance  Scooting: Minimum assistance    Transfers:  Sit to Stand: Moderate assistance;Assist x2    Balance:  Sitting: Intact; Without support  Standing: Impaired; With support    ADL Intervention:  Lower Body Dressing Assistance  Socks: Maximum assistance  Position Performed: Long sitting on bed  Cues: Verbal cues provided;Physical assistance    Toileting  Bowel Hygiene: Total assistance (dependent)    Therapeutic Exercises:   Pt participated in UE HEP x10 shoulder raises and x20 finger flexion/extension while seated in chair. Pain:  8/10 generalized    Activity Tolerance:   Fair and requires rest breaks    After treatment patient left in no apparent distress:   Sitting in chair, Heels elevated for pressure relief, and Call bell within reach, nursing updated    COMMUNICATION/COLLABORATION:   The patients plan of care was discussed with: Physical therapy assistant and Registered nurse. OT/PT sessions occurred together for increased patient and clinician safety as pt with decreased activity tolerance and requires A x2 for mobility at this time.     Mis Coppola  Time Calculation: 18 mins   Problem: Self Care Deficits Care Plan (Adult)  Goal: *Acute Goals and Plan of Care (Insert Text)  Description: Pt will be SBA sup <> sit in prep for EOB ADLs  Pt will be Mod I grooming sitting EOB  Pt will be Mod I LE dressing sitting EOB/long sit  Pt will be SBA sit <>  prep for toileting LRAD  Pt will be SBA toileting/toilet transfer/cloth mgmt LRAD  Pt will be IND following UE HEP in prep for self care tasks     Outcome: Progressing Towards Goal

## 2020-10-14 NOTE — PROGRESS NOTES
Hospitalist Progress Note           Daily Progress Note: 10/14/2020      Subjective: The patient is seen for follow  up. Patient is alert, lucid, cooperative. Blood pressure still running on the low side but adequate. Cp earlier, neg trop. Dec uop, str cath ~100ml    Still maintains poor appetite. States anticipation for skilled nursing facility \"once my Medicaid goes through\".     Problem List:  Problem List as of 10/14/2020 Date Reviewed: 9/23/2020          Codes Class Noted - Resolved    Cardiomyopathy ([facility] ICD-10-CM: I42.9  ICD-9-CM: 425.4  5/17/2019 - Present        Heart failure ([facility] ICD-10-CM: I50.9  ICD-9-CM: 428.9  9/23/2020 - Present        Adult failure to thrive ICD-10-CM: R62.7  ICD-9-CM: 783.7  9/23/2020 - Present        Nausea & vomiting ICD-10-CM: R11.2  ICD-9-CM: 787.01  9/23/2020 - Present        Hypotension ICD-10-CM: I95.9  ICD-9-CM: 458.9  9/23/2020 - Present        Acute exacerbation of CHF (congestive heart failure) ([facility] ICD-10-CM: I50.9  ICD-9-CM: 428.0  9/23/2020 - Present        Ischemic cardiomyopathy ICD-10-CM: I25.5  ICD-9-CM: 414.8  4/23/2019 - Present        Left bundle branch block ICD-10-CM: I44.7  ICD-9-CM: 426.3  4/23/2019 - Present              Medications reviewed  Current Facility-Administered Medications   Medication Dose Route Frequency    alogliptin (NESINA) tablet 25 mg  25 mg Oral DAILY    midodrine (PROAMATINE) tablet 10 mg  10 mg Oral Q6H    insulin glargine (LANTUS) injection 9 Units  9 Units SubCUTAneous QHS    atorvastatin (LIPITOR) tablet 20 mg  20 mg Oral QHS    docusate sodium (COLACE) capsule 100 mg  100 mg Oral BID PRN    levoFLOXacin (LEVAQUIN) tablet 500 mg  500 mg Oral Q24H    oxyCODONE IR (ROXICODONE) tablet 5 mg  5 mg Oral BID PRN    potassium chloride (K-DUR, KLOR-CON) SR tablet 20 mEq  20 mEq Oral DAILY    dronabinoL (MARINOL) capsule 5 mg  5 mg Oral ACB&D    heparin porcine injection 5,000 Units 5,000 Units SubCUTAneous Q8H    glucose chewable tablet 16 g  4 Tab Oral PRN    glucagon (GLUCAGEN) injection 1 mg  1 mg IntraMUSCular PRN    dextrose (D50W) injection syrg 12.5-25 g  25-50 mL IntraVENous PRN    cholecalciferol (VITAMIN D3) (1000 Units /25 mcg) tablet 1 Tab  1,000 Units Oral DAILY    clopidogreL (PLAVIX) tablet 75 mg  75 mg Oral DAILY    ezetimibe (ZETIA) tablet 10 mg  10 mg Oral DAILY    ferrous sulfate tablet 325 mg  1 Tab Oral DAILY WITH BREAKFAST    levothyroxine (SYNTHROID) tablet 88 mcg  88 mcg Oral 6am    pantoprazole (PROTONIX) tablet 40 mg  40 mg Oral ACB    [Held by provider] ranolazine ER (RANEXA) tablet 500 mg  500 mg Oral BID    insulin lispro (HUMALOG) injection   SubCUTAneous AC&HS    dextrose (D50W) injection syrg 12.5-25 g  25-50 mL IntraVENous PRN    multivitamin (ONE A DAY) tablet 1 Tab  1 Tab Oral DAILY    B complex-vitaminC-folic acid (NEPHROCAP) cap  1 Cap Oral DAILY    acetaminophen (TYLENOL) tablet 650 mg  650 mg Oral Q4H PRN       Review of Systems:   Constitutional: Appetite remains poor, denies weight loss, no fever, no chills, no night sweats. +weakness  Eye: No recent visual disturbances, no discharge, no double vision  Ear/nose/mouth/throat : No hearing disturbance, no ear pain, no nasal congestion, no sore throat, no trouble swallowing. Respiratory : no  shortness of breath, no hemoptysis, no wheezing  Cardiovascular : + chest pain, no palpitation, no palpitation, + orthopnea, no paroxysmal nocturnal dyspnea, no peripheral edema  Gastrointestinal : No nausea, no vomiting, no diarrhea, constipation, heartburn, abdominal pain  Genitourinary : No dysuria, no hematuria, no increased frequency,   Endocrine : No excessive thirst, no polyuria no cold intolerance, no heat intolerance.   Immunologic : No hives, urticaria, no seasonal allergies,   Musculoskeletal : Complains of generalized weakness,  No joint swelling, pain, effusion,  no back pain, no neck pain, Integumentary : No rash, no pruritus, no ecchymosis  Hematology : No petechiae, No easy bruising,  No tendency to bleed easy  Neurology : Denies change in mental status, + abnormal balance, no headache, no confusion, numbness, tingling,  Psychiatric : No depression no anxiety  Objective:   Physical Exam:     Visit Vitals  /72   Pulse 82   Temp 98 °F (36.7 °C)   Resp 20   Ht 5' 6\" (1.676 m)   Wt 95.9 kg (211 lb 6.7 oz)   SpO2 98%   BMI 34.12 kg/m²    O2 Flow Rate (L/min): 2 l/min O2 Device: Nasal cannula    Temp (24hrs), Av °F (36.7 °C), Min:97.7 °F (36.5 °C), Max:98.4 °F (36.9 °C)    No intake/output data recorded. No intake/output data recorded. General:  Alert, cooperative, no distress, appears stated age. Lungs:   Clear to auscultation bilaterally. Chest wall:  No tenderness or deformity. Heart:  Regular rate and rhythm, S1, S2 normal, no murmur, click, rub or gallop. Abdomen:   Soft, non-tender. Bowel sounds normal. No masses,  No organomegaly. Extremities: Extremities normal, atraumatic, no cyanosis or edema. Moving all extremities, generally weak   Pulses: 2+ and symmetric all extremities. Skin: Skin color, texture, turgor normal. No rashes or lesions   Neurologic: CNII-XII intact. No gross sensory or motor deficits     Data Review:       Recent Days:  Recent Labs     10/12/20  1015   WBC 8.7   HGB 10.0*   HCT 31.2*   *     Recent Labs     10/12/20  1015   *   K 4.2      CO2 27   *   BUN 14   CREA 0.90   CA 9.2     No results for input(s): PH, PCO2, PO2, HCO3, FIO2 in the last 72 hours.     24 Hour Results:  Recent Results (from the past 24 hour(s))   GLUCOSE, POC    Collection Time: 10/13/20  8:13 PM   Result Value Ref Range    Glucose (POC) 90 65 - 100 mg/dL    Performed by Jasper Power    GLUCOSE, POC    Collection Time: 10/14/20  7:26 AM   Result Value Ref Range    Glucose (POC) 130 (H) 65 - 100 mg/dL    Performed by CHELI Morse Collection Time: 10/14/20 11:14 AM   Result Value Ref Range    Glucose (POC) 179 (H) 65 - 100 mg/dL    Performed by Kimberly Staples    TROPONIN I    Collection Time: 10/14/20  3:00 PM   Result Value Ref Range    Troponin-I, Qt. <0.05 <0.05 ng/mL           Assessment/          --Congestive heart failure: Resolved patient currently off Lasix due to hypotension. Dec uop, volume depleted, am bmp.  --Hypotension patient on Midodrine, Continue, BB/Ace bp rx on hold 2/2 bp.(pta metop xl 25 mg daily, lisinopril 2.5mg daily)  --Generalized weakness continue PT OT plans discharge to rehab  --Left lower lobe pneumonia: Patient on Levaquin, improving  --Asthenia: snf anticipated    Plan:  Continue LEVAQUIN  Continue midodrine    Remains off BB/Ace 2/2 BB, monitor to resume. Awaiting snf placement- cm appreciated. Patient is a full code    Care Plan discussed with: Patient/Family    Total time spent with patient: 20 minutes.     Farzaneh Watkins MD

## 2020-10-14 NOTE — PROGRESS NOTES
Primary and cardiology provider notified of chest pain. Trop and ekg ordered per provider. Also notified of retention, straight cathed once per provider order. <100 mls of yellow urine drained from bladder.

## 2020-10-14 NOTE — ROUTINE PROCESS
Bedside and Verbal shift change report given to bre Kwan RN (oncoming nurse) by ARAM Garza (offgoing nurse). Report included the following information SBAR, Kardex, [facility], Recent Results and Cardiac Rhythm Paced.

## 2020-10-14 NOTE — PROGRESS NOTES
Spiritual Care Assessment/Progress Note  [facility]      NAME: Zeferino Ly      MRN: [id number]  AGE: 79 y.o.  SEX: male  Taoist Affiliation: Anabaptism   Language: English     10/14/2020     Total Time (in minutes): 10     Spiritual Assessment begun in [address] through conversation with:         [x]Patient        [] Family    [] Friend(s)        Reason for Consult: Initial/Spiritual assessment, patient floor     Spiritual beliefs: (Please include comment if needed)     [] Identifies with a navya tradition:         [] Supported by a navya community:            [] Claims no spiritual orientation:           [] Seeking spiritual identity:                [] Adheres to an individual form of spirituality:           [x] Not able to assess:                           Identified resources for coping:      [] Prayer                               [] Music                  [] Guided Imagery     [x] Family/friends                 [] Pet visits     [] Devotional reading                         [] Unknown     [] Other:                                             Interventions offered during this visit: (See comments for more details)    Patient Interventions: Affirmation of navya, Catharsis/review of pertinent events in supportive environment, Affirmation of emotions/emotional suffering, Coping skills reviewed/reinforced, Initial/Spiritual assessment, patient floor           Plan of Care:     [] Support spiritual and/or cultural needs    [] Support AMD and/or advance care planning process      [] Support grieving process   [] Coordinate Rites and/or Rituals    [] Coordination with community clergy   [] No spiritual needs identified at this time   [] Detailed Plan of Care below (See Comments)  [] Make referral to Music Therapy  [] Make referral to Pet Therapy     [] Make referral to Addiction services  [] Make referral to [facility]  [] Make referral to Spiritual Care Partner  [] No future visits requested        [x] Follow up visits as needed     Comments: The purpose of the visit was to do a spiritual assessment on the patient. The patient was in bed and he mentioned that he was in pain. He shared that he has three loving and supportive daughters. The patient mentioned that he expects to get better soon and get back home. The  advocated for the patient and informed his nurse that he needed her attention. The  provided the ministry of presence and the comfort of spiritual care. [address] [facility] [address].    can be reached by calling the  at [facility]  [phone]

## 2020-10-14 NOTE — PROGRESS NOTES
PHYSICAL THERAPY TREATMENT  Patient: Edd Wen (50 y.o. male)  Date: 10/14/2020  Diagnosis: Heart failure (HCC) [I50.9]  Acute exacerbation of CHF (congestive heart failure) ([facility] [I50.9]  Adult failure to thrive [R62.7]  Nausea & vomiting [R11.2]  Hypotension [I95.9]   <principal problem not specified>       Precautions:    Chart, physical therapy assessment, plan of care and goals were reviewed. ASSESSMENT  Patient continues with skilled PT services and is progressing towards goals. Pt semi supine in bed upon arrival and agreeable to session. Performed bed mobility with min A x 1. STS performed with mod A x 2. Able to stand with RW and CGAx2 for ~30 seconds while pericare performed due to pt having BM. Performed bed>chair xfer with min A x 2 and RW, no LOB or knee b uckling noted. Performed seated TE, see details below. Pt reports fatigue post transfer. PLAN :  Patient continues to benefit from skilled intervention to address the above impairments. Continue treatment per established plan of care. to address goals. Recommendation for discharge: (in order for the patient to meet his/her long term goals)  Therapy up to 5 days/week in SNF setting    This discharge recommendation:  Has been made in collaboration with the attending provider and/or case management    IF patient discharges home will need the following DME: patient owns DME required for discharge       SUBJECTIVE:   Patient stated I want the bandage on my leg changed.     OBJECTIVE DATA SUMMARY:   Critical Behavior:  Neurologic State: Alert  Orientation Level: Oriented X4  Cognition: Appropriate decision making, Appropriate safety awareness, Follows commands     Functional Mobility Training:  Bed Mobility:     Supine to Sit: Minimum assistance  Sit to Supine: Minimum assistance  Scooting: Minimum assistance        Transfers:  Sit to Stand:  Moderate assistance;Assist x2  Stand to Sit: Moderate assistance;Assist x2    Balance:  Sitting: Intact; Without support  Standing: Impaired; With support  Ambulation/Gait Training:  Distance (ft): 3 Feet (ft)     Ambulation - Level of Assistance: Minimal assistance;Assist x2    Speed/Zulma: Slow  Step Length: Left shortened;Right shortened            Therapeutic Exercises:   1 x 20 AP  1 x 12 LAQ  1 x 10 Rosie    Pain Rating:  Pt states he is in 8-9/10 all over his body    Activity Tolerance:   Poor  Please refer to the flowsheet for vital signs taken during this treatment. After treatment patient left in no apparent distress:   Sitting in chair and Call bell within reach    COMMUNICATION/COLLABORATION:   The patients plan of care was discussed with: Occupational therapist.     OT/PT sessions occurred together for increased patient and clinician safety      Problem: Mobility Impaired (Adult and Pediatric)  Goal: *Acute Goals and Plan of Care (Insert Text)  Description: Pt will be I with LE HEP in 7 days. Pt will perform bed mobility with min A x1 in 7 days. Pt will perform transfers with min Ax1 in 7 days. Pt will amb 10-25 feet with LRAD safely with mod I in 7 days.      Outcome: Progressing Towards Goal     Dawn Smith   Time Calculation: 19 mins

## 2020-10-14 NOTE — PROGRESS NOTES
BRI has contacted [facility]. via phone [phone] this date in attempt to speak with someone in admissions, no answer, message left requesting a call back in order to confirm bed for patient tomorrow. BRI has also messaged [place] in [facility], no reply. CM continues to get in contact with their admissions. BRI received communication from [facility] that patient can not be applied for Medicaid at this time as the family has not given them the financial documents necessary to apply, CM attempted to contact patient's daughter, Brisa Arroyo [phone]. However no answer at this time. Detailed message was left requesting a callback. BRI continues to follow.

## 2020-10-15 LAB
ANION GAP SERPL CALC-SCNC: 6 MMOL/L (ref 5–15)
BASOPHILS # BLD: 0.1 K/UL (ref 0–0.1)
BASOPHILS NFR BLD: 2 % (ref 0–1)
BUN SERPL-MCNC: 12 MG/DL (ref 6–20)
BUN/CREAT SERPL: 13 (ref 12–20)
CA-I BLD-MCNC: 10 MG/DL (ref 8.5–10.1)
CHLORIDE SERPL-SCNC: 101 MMOL/L (ref 97–108)
CO2 SERPL-SCNC: 27 MMOL/L (ref 21–32)
CREAT SERPL-MCNC: 0.92 MG/DL (ref 0.7–1.3)
DIFFERENTIAL METHOD BLD: ABNORMAL
EOSINOPHIL # BLD: 0.2 K/UL (ref 0–0.4)
EOSINOPHIL NFR BLD: 3 % (ref 0–7)
ERYTHROCYTE [DISTWIDTH] IN BLOOD BY AUTOMATED COUNT: 18.3 % (ref 11.5–14.5)
GLUCOSE BLD STRIP.AUTO-MCNC: 113 MG/DL (ref 65–100)
GLUCOSE BLD STRIP.AUTO-MCNC: 117 MG/DL (ref 65–100)
GLUCOSE BLD STRIP.AUTO-MCNC: 126 MG/DL (ref 65–100)
GLUCOSE BLD STRIP.AUTO-MCNC: 144 MG/DL (ref 65–100)
GLUCOSE SERPL-MCNC: 154 MG/DL (ref 65–100)
HCT VFR BLD AUTO: 33.7 % (ref 36.6–50.3)
HGB BLD-MCNC: 10.7 G/DL (ref 12.1–17)
IMM GRANULOCYTES # BLD AUTO: 0.4 K/UL (ref 0–0.04)
IMM GRANULOCYTES NFR BLD AUTO: 5 % (ref 0–0.5)
LYMPHOCYTES # BLD: 0.7 K/UL (ref 0.8–3.5)
LYMPHOCYTES NFR BLD: 9 % (ref 12–49)
MCH RBC QN AUTO: 26.8 PG (ref 26–34)
MCHC RBC AUTO-ENTMCNC: 31.8 G/DL (ref 30–36.5)
MCV RBC AUTO: 84.5 FL (ref 80–99)
MONOCYTES # BLD: 1.1 K/UL (ref 0–1)
MONOCYTES NFR BLD: 13 % (ref 5–13)
NEUTS SEG # BLD: 5.9 K/UL (ref 1.8–8)
NEUTS SEG NFR BLD: 73 % (ref 32–75)
NRBC # BLD: 0 K/UL (ref 0–0.01)
NRBC BLD-RTO: 0 PER 100 WBC
PERFORMED BY, TECHID: ABNORMAL
PLATELET # BLD AUTO: 514 K/UL (ref 150–400)
PMV BLD AUTO: 9.7 FL (ref 8.9–12.9)
POTASSIUM SERPL-SCNC: 4.3 MMOL/L (ref 3.5–5.1)
RBC # BLD AUTO: 3.99 M/UL (ref 4.1–5.7)
SODIUM SERPL-SCNC: 134 MMOL/L (ref 136–145)
WBC # BLD AUTO: 8 K/UL (ref 4.1–11.1)

## 2020-10-15 PROCEDURE — 74011250636 HC RX REV CODE- 250/636: Performed by: HOSPITALIST

## 2020-10-15 PROCEDURE — 80048 BASIC METABOLIC PNL TOTAL CA: CPT

## 2020-10-15 PROCEDURE — 82962 GLUCOSE BLOOD TEST: CPT

## 2020-10-15 PROCEDURE — 65270000029 HC RM PRIVATE

## 2020-10-15 PROCEDURE — 77010033678 HC OXYGEN DAILY

## 2020-10-15 PROCEDURE — 74011250637 HC RX REV CODE- 250/637: Performed by: INTERNAL MEDICINE

## 2020-10-15 PROCEDURE — 74011250637 HC RX REV CODE- 250/637: Performed by: FAMILY MEDICINE

## 2020-10-15 PROCEDURE — 36415 COLL VENOUS BLD VENIPUNCTURE: CPT

## 2020-10-15 PROCEDURE — 85025 COMPLETE CBC W/AUTO DIFF WBC: CPT

## 2020-10-15 PROCEDURE — 94760 N-INVAS EAR/PLS OXIMETRY 1: CPT

## 2020-10-15 PROCEDURE — 74011250637 HC RX REV CODE- 250/637: Performed by: HOSPITALIST

## 2020-10-15 RX ADMIN — HEPARIN SODIUM 5000 UNITS: 10000 INJECTION, SOLUTION INTRAVENOUS; SUBCUTANEOUS at 05:41

## 2020-10-15 RX ADMIN — RENO CAPS 1 CAPSULE: 100; 1.5; 1.7; 20; 10; 1; 150; 5; 6 CAPSULE ORAL at 09:00

## 2020-10-15 RX ADMIN — EZETIMIBE 10 MG: 10 TABLET ORAL at 09:34

## 2020-10-15 RX ADMIN — LEVOTHYROXINE SODIUM 88 MCG: 88 TABLET ORAL at 05:40

## 2020-10-15 RX ADMIN — MULTIVITAMIN TABLET 1 TABLET: TABLET at 09:36

## 2020-10-15 RX ADMIN — HEPARIN SODIUM 5000 UNITS: 10000 INJECTION, SOLUTION INTRAVENOUS; SUBCUTANEOUS at 12:57

## 2020-10-15 RX ADMIN — CLOPIDOGREL BISULFATE 75 MG: 75 TABLET ORAL at 09:36

## 2020-10-15 RX ADMIN — Medication 1 TABLET: at 09:33

## 2020-10-15 RX ADMIN — Medication 95 MG: at 17:55

## 2020-10-15 RX ADMIN — Medication 95 MG: at 21:47

## 2020-10-15 RX ADMIN — ALOGLIPTIN 25 MG: 25 TABLET, FILM COATED ORAL at 09:36

## 2020-10-15 RX ADMIN — HEPARIN SODIUM 5000 UNITS: 10000 INJECTION, SOLUTION INTRAVENOUS; SUBCUTANEOUS at 21:47

## 2020-10-15 RX ADMIN — MIDODRINE HYDROCHLORIDE 10 MG: 5 TABLET ORAL at 17:55

## 2020-10-15 RX ADMIN — MIDODRINE HYDROCHLORIDE 10 MG: 5 TABLET ORAL at 12:58

## 2020-10-15 RX ADMIN — DRONABINOL 5 MG: 2.5 CAPSULE ORAL at 07:30

## 2020-10-15 RX ADMIN — PANTOPRAZOLE SODIUM 40 MG: 40 TABLET, DELAYED RELEASE ORAL at 07:30

## 2020-10-15 RX ADMIN — MIDODRINE HYDROCHLORIDE 10 MG: 5 TABLET ORAL at 05:40

## 2020-10-15 RX ADMIN — POTASSIUM CHLORIDE 20 MEQ: 1500 TABLET, EXTENDED RELEASE ORAL at 09:34

## 2020-10-15 RX ADMIN — LEVOFLOXACIN 500 MG: 500 TABLET, FILM COATED ORAL at 17:55

## 2020-10-15 RX ADMIN — FERROUS SULFATE TAB 325 MG (65 MG ELEMENTAL FE) 325 MG: 325 (65 FE) TAB at 12:57

## 2020-10-15 RX ADMIN — PANTOPRAZOLE SODIUM 40 MG: 40 TABLET, DELAYED RELEASE ORAL at 05:40

## 2020-10-15 RX ADMIN — Medication 95 MG: at 09:34

## 2020-10-15 RX ADMIN — DRONABINOL 5 MG: 2.5 CAPSULE ORAL at 17:55

## 2020-10-15 RX ADMIN — ATORVASTATIN CALCIUM 20 MG: 20 TABLET, FILM COATED ORAL at 21:47

## 2020-10-15 NOTE — PROGRESS NOTES
CM contacted by [facility] [place] they are not able to take patient on this date, anticipate dc tomorrow 10/16/2020.

## 2020-10-15 NOTE — PROGRESS NOTES
Length of stay skin assessment completed with Shantel Bain RN. Healing wounds on bottom, skin pink; wound to left lower extremity, bandaged, clean dry and intact.

## 2020-10-15 NOTE — PROGRESS NOTES
Hospitalist Progress Note           Daily Progress Note: 10/15/2020      Subjective: The patient is seen for follow  up. Patient is alert, lucid, cooperative. Blood pressure has maintained adequate  Cp earlier, neg trop. uop ~500      Still maintains poor appetite.     Lists of hospitals in the [place] anticipation for skilled nursing facility tomorrow   Problem List:  Problem List as of 10/15/2020 Date Reviewed: 9/23/2020          Codes Class Noted - Resolved    Cardiomyopathy ([facility] ICD-10-CM: I42.9  ICD-9-CM: 425.4  5/17/2019 - Present        Heart failure ([facility] ICD-10-CM: I50.9  ICD-9-CM: 428.9  9/23/2020 - Present        Adult failure to thrive ICD-10-CM: R62.7  ICD-9-CM: 783.7  9/23/2020 - Present        Nausea & vomiting ICD-10-CM: R11.2  ICD-9-CM: 787.01  9/23/2020 - Present        Hypotension ICD-10-CM: I95.9  ICD-9-CM: 458.9  9/23/2020 - Present        Acute exacerbation of CHF (congestive heart failure) ([facility] ICD-10-CM: I50.9  ICD-9-CM: 428.0  9/23/2020 - Present        Ischemic cardiomyopathy ICD-10-CM: I25.5  ICD-9-CM: 414.8  4/23/2019 - Present        Left bundle branch block ICD-10-CM: I44.7  ICD-9-CM: 426.3  4/23/2019 - Present              Medications reviewed  Current Facility-Administered Medications   Medication Dose Route Frequency    phenazopyridine (PYRIDIUM) tab 95 mg  95 mg Oral TID    alogliptin (NESINA) tablet 25 mg  25 mg Oral DAILY    midodrine (PROAMATINE) tablet 10 mg  10 mg Oral Q6H    insulin glargine (LANTUS) injection 9 Units  9 Units SubCUTAneous QHS    atorvastatin (LIPITOR) tablet 20 mg  20 mg Oral QHS    docusate sodium (COLACE) capsule 100 mg  100 mg Oral BID PRN    levoFLOXacin (LEVAQUIN) tablet 500 mg  500 mg Oral Q24H    oxyCODONE IR (ROXICODONE) tablet 5 mg  5 mg Oral BID PRN    potassium chloride (K-DUR, KLOR-CON) SR tablet 20 mEq  20 mEq Oral DAILY    dronabinoL (MARINOL) capsule 5 mg  5 mg Oral ACB&D    heparin porcine injection 5,000 Units  5,000 Units SubCUTAneous Q8H    glucose chewable tablet 16 g  4 Tab Oral PRN    glucagon (GLUCAGEN) injection 1 mg  1 mg IntraMUSCular PRN    dextrose (D50W) injection syrg 12.5-25 g  25-50 mL IntraVENous PRN    cholecalciferol (VITAMIN D3) (1000 Units /25 mcg) tablet 1 Tab  1,000 Units Oral DAILY    clopidogreL (PLAVIX) tablet 75 mg  75 mg Oral DAILY    ezetimibe (ZETIA) tablet 10 mg  10 mg Oral DAILY    ferrous sulfate tablet 325 mg  1 Tab Oral DAILY WITH BREAKFAST    levothyroxine (SYNTHROID) tablet 88 mcg  88 mcg Oral 6am    pantoprazole (PROTONIX) tablet 40 mg  40 mg Oral ACB    [Held by provider] ranolazine ER (RANEXA) tablet 500 mg  500 mg Oral BID    insulin lispro (HUMALOG) injection   SubCUTAneous AC&HS    dextrose (D50W) injection syrg 12.5-25 g  25-50 mL IntraVENous PRN    multivitamin (ONE A DAY) tablet 1 Tab  1 Tab Oral DAILY    B complex-vitaminC-folic acid (NEPHROCAP) cap  1 Cap Oral DAILY    acetaminophen (TYLENOL) tablet 650 mg  650 mg Oral Q4H PRN       Review of Systems:   Constitutional: Appetite remains poor, denies weight loss, no fever, no chills, no night sweats. +weakness  Eye: No recent visual disturbances, no discharge, no double vision  Ear/nose/mouth/throat : No hearing disturbance, no ear pain, no nasal congestion, no sore throat, no trouble swallowing. Respiratory : no  shortness of breath, no hemoptysis, no wheezing  Cardiovascular : + chest pain, no palpitation, no palpitation, + orthopnea, no paroxysmal nocturnal dyspnea, no peripheral edema  Gastrointestinal : No nausea, no vomiting, no diarrhea, constipation, heartburn, abdominal pain  Genitourinary : + dysuria, no hematuria, no increased frequency,   Endocrine : No excessive thirst, no polyuria no cold intolerance, no heat intolerance.   Immunologic : No hives, urticaria, no seasonal allergies,   Musculoskeletal : Complains of generalized weakness,  No joint swelling, pain, effusion,  no back pain, no neck pain, Integumentary : No rash, no pruritus, no ecchymosis  Hematology : No petechiae, No easy bruising,  No tendency to bleed easy  Neurology : Denies change in mental status, + abnormal balance, no headache, no confusion, numbness, tingling,  Psychiatric : No depression no anxiety  Objective:   Physical Exam:     Visit Vitals  BP (!) 117/55   Pulse 77   Temp 98.3 °F (36.8 °C)   Resp 20   Ht 5' 6\" (1.676 m)   Wt 95.9 kg (211 lb 6.7 oz)   SpO2 99%   BMI 34.12 kg/m²    O2 Flow Rate (L/min): 2 l/min O2 Device: Nasal cannula    Temp (24hrs), Av.9 °F (36.6 °C), Min:97.4 °F (36.3 °C), Max:98.3 °F (36.8 °C)    10/15 0701 - 10/15 1900  In: 480 [P.O.:480]  Out: 200 [Urine:200]   10/13 1901 - 10/15 07  In: -   Out: 500 [Urine:500]    General:  Alert, cooperative, no distress, appears stated age. Lungs:   Clear to auscultation bilaterally. Chest wall:  No tenderness or deformity. Heart:  Regular rate and rhythm, S1, S2 normal, no murmur, click, rub or gallop. Abdomen:   Soft, non-tender. Bowel sounds normal. No masses,  No organomegaly. Extremities: Extremities normal, atraumatic, no cyanosis or edema. Moving all extremities, generally weak   Pulses: 2+ and symmetric all extremities. Skin: Skin color, texture, turgor normal. No rashes or lesions   Neurologic: CNII-XII intact. No gross sensory or motor deficits     Data Review:       Recent Days:  Recent Labs     10/15/20  1015   WBC 8.0   HGB 10.7*   HCT 33.7*   *     Recent Labs     10/15/20  1015   *   K 4.3      CO2 27   *   BUN 12   CREA 0.92   CA 10.0     No results for input(s): PH, PCO2, PO2, HCO3, FIO2 in the last 72 hours.     24 Hour Results:  Recent Results (from the past 24 hour(s))   GLUCOSE, POC    Collection Time: 10/14/20  9:04 PM   Result Value Ref Range    Glucose (POC) 172 (H) 65 - 100 mg/dL    Performed by VIJAYA Rivera 51, POC    Collection Time: 10/15/20  7:35 AM   Result Value Ref Range    Glucose (POC) 117 (H) 65 - 100 mg/dL    Performed by [facility]    METABOLIC PANEL, BASIC    Collection Time: 10/15/20 10:15 AM   Result Value Ref Range    Sodium 134 (L) 136 - 145 mmol/L    Potassium 4.3 3.5 - 5.1 mmol/L    Chloride 101 97 - 108 mmol/L    CO2 27 21 - 32 mmol/L    Anion gap 6 5 - 15 mmol/L    Glucose 154 (H) 65 - 100 mg/dL    BUN 12 6 - 20 mg/dL    Creatinine 0.92 0.70 - 1.30 mg/dL    BUN/Creatinine ratio 13 12 - 20      GFR est AA >60 >60 ml/min/1.73m2    GFR est non-AA >60 >60 ml/min/1.73m2    Calcium 10.0 8.5 - 10.1 mg/dL   CBC WITH AUTOMATED DIFF    Collection Time: 10/15/20 10:15 AM   Result Value Ref Range    WBC 8.0 4.1 - 11.1 K/uL    RBC 3.99 (L) 4.10 - 5.70 M/uL    HGB 10.7 (L) 12.1 - 17.0 g/dL    HCT 33.7 (L) 36.6 - 50.3 %    MCV 84.5 80.0 - 99.0 FL    MCH 26.8 26.0 - 34.0 PG    MCHC 31.8 30.0 - 36.5 g/dL    RDW 18.3 (H) 11.5 - 14.5 %    PLATELET 863 (H) 584 - 400 K/uL    MPV 9.7 8.9 - 12.9 FL    NRBC 0.0 0  WBC    ABSOLUTE NRBC 0.00 0.00 - 0.01 K/uL    NEUTROPHILS 73 32 - 75 %    LYMPHOCYTES 9 (L) 12 - 49 %    MONOCYTES 13 5 - 13 %    EOSINOPHILS 3 0 - 7 %    BASOPHILS 2 (H) 0 - 1 %    IMMATURE GRANULOCYTES 5 (H) 0.0 - 0.5 %    ABS. NEUTROPHILS 5.9 1.8 - 8.0 K/UL    ABS. LYMPHOCYTES 0.7 (L) 0.8 - 3.5 K/UL    ABS. MONOCYTES 1.1 (H) 0.0 - 1.0 K/UL    ABS. EOSINOPHILS 0.2 0.0 - 0.4 K/UL    ABS. BASOPHILS 0.1 0.0 - 0.1 K/UL    ABS. IMM. GRANS. 0.4 (H) 0.00 - 0.04 K/UL    DF AUTOMATED     GLUCOSE, POC    Collection Time: 10/15/20 11:18 AM   Result Value Ref Range    Glucose (POC) 144 (H) 65 - 100 mg/dL    Performed by Aldo Akins, POC    Collection Time: 10/15/20  3:25 PM   Result Value Ref Range    Glucose (POC) 126 (H) 65 - 100 mg/dL    Performed by Celine Robledo            Assessment/          --Congestive heart failure: Resolved patient currently off Lasix due to hypotension. Dec uop, volume depleted, am bmp.  Fluid restrict 1200 ml daily  --Hypotension patient on Midodrine, Continue, BB/Ace bp rx on hold 2/2 bp.(pta metop xl 25 mg daily, lisinopril 2.5mg daily)  --Generalized weakness continue PT OT plans discharge to rehab  --Left lower lobe pneumonia: Patient on Levaquin, improving  --Asthenia: snf anticipated  --uti; pansensitive e faecalis conpleted course abx. Plan:  Continue LEVAQUIN x 2 more days  Continue midodrine    Remains off BB/Ace 2/2 BB, monitor to resume. Awaiting snf placement- cm appreciated. Am? Fluid restrict 1500 ml daily    Patient is a full code    Care Plan discussed with: Patient/Family    Total time spent with patient: 25 minutes.     Ganga Hope MD

## 2020-10-16 VITALS
RESPIRATION RATE: 18 BRPM | BODY MASS INDEX: 33.98 KG/M2 | HEART RATE: 70 BPM | DIASTOLIC BLOOD PRESSURE: 57 MMHG | OXYGEN SATURATION: 91 % | WEIGHT: 211.42 LBS | HEIGHT: 66 IN | SYSTOLIC BLOOD PRESSURE: 91 MMHG | TEMPERATURE: 97.6 F

## 2020-10-16 PROBLEM — R57.0 CARDIOGENIC SHOCK (HCC): Status: RESOLVED | Noted: 2020-10-16 | Resolved: 2020-10-16

## 2020-10-16 PROBLEM — I73.9 PVD (PERIPHERAL VASCULAR DISEASE) (HCC): Chronic | Status: ACTIVE | Noted: 2020-10-16

## 2020-10-16 PROBLEM — N30.00 ACUTE CYSTITIS WITHOUT HEMATURIA: Status: RESOLVED | Noted: 2020-10-16 | Resolved: 2020-10-16

## 2020-10-16 PROBLEM — E11.9 DM2 (DIABETES MELLITUS, TYPE 2) (HCC): Status: ACTIVE | Noted: 2020-10-16

## 2020-10-16 PROBLEM — Z95.0 PACEMAKER: Chronic | Status: ACTIVE | Noted: 2020-10-16

## 2020-10-16 PROBLEM — I50.9 ACUTE EXACERBATION OF CHF (CONGESTIVE HEART FAILURE) (HCC): Status: RESOLVED | Noted: 2020-09-23 | Resolved: 2020-10-16

## 2020-10-16 PROBLEM — R53.1 ASTHENIA: Chronic | Status: ACTIVE | Noted: 2020-10-16

## 2020-10-16 PROBLEM — I95.9 HYPOTENSION: Status: RESOLVED | Noted: 2020-09-23 | Resolved: 2020-10-16

## 2020-10-16 PROBLEM — L98.9 SKIN LESION OF LEFT LEG: Status: ACTIVE | Noted: 2020-10-16

## 2020-10-16 PROBLEM — R57.0 CARDIOGENIC SHOCK (HCC): Status: ACTIVE | Noted: 2020-10-16

## 2020-10-16 PROBLEM — N30.00 ACUTE CYSTITIS WITHOUT HEMATURIA: Status: ACTIVE | Noted: 2020-10-16

## 2020-10-16 PROBLEM — R11.2 NAUSEA & VOMITING: Status: RESOLVED | Noted: 2020-09-23 | Resolved: 2020-10-16

## 2020-10-16 PROBLEM — I25.10 CAD (CORONARY ARTERY DISEASE): Chronic | Status: ACTIVE | Noted: 2020-10-16

## 2020-10-16 PROBLEM — I27.20 PULMONARY HYPERTENSION (HCC): Chronic | Status: ACTIVE | Noted: 2020-10-16

## 2020-10-16 LAB
GLUCOSE BLD STRIP.AUTO-MCNC: 103 MG/DL (ref 65–100)
GLUCOSE BLD STRIP.AUTO-MCNC: 131 MG/DL (ref 65–100)
GLUCOSE BLD STRIP.AUTO-MCNC: 158 MG/DL (ref 65–100)
PERFORMED BY, TECHID: ABNORMAL

## 2020-10-16 PROCEDURE — 74011250637 HC RX REV CODE- 250/637: Performed by: INTERNAL MEDICINE

## 2020-10-16 PROCEDURE — 82962 GLUCOSE BLOOD TEST: CPT

## 2020-10-16 PROCEDURE — 77010033678 HC OXYGEN DAILY

## 2020-10-16 PROCEDURE — 74011250637 HC RX REV CODE- 250/637: Performed by: HOSPITALIST

## 2020-10-16 PROCEDURE — 94760 N-INVAS EAR/PLS OXIMETRY 1: CPT

## 2020-10-16 PROCEDURE — 74011250637 HC RX REV CODE- 250/637: Performed by: FAMILY MEDICINE

## 2020-10-16 PROCEDURE — 97530 THERAPEUTIC ACTIVITIES: CPT

## 2020-10-16 PROCEDURE — 74011250636 HC RX REV CODE- 250/636: Performed by: HOSPITALIST

## 2020-10-16 PROCEDURE — 97110 THERAPEUTIC EXERCISES: CPT

## 2020-10-16 PROCEDURE — 74011250637 HC RX REV CODE- 250/637: Performed by: NURSE PRACTITIONER

## 2020-10-16 RX ORDER — DRONABINOL 5 MG/1
5 CAPSULE ORAL
Qty: 60 CAP | Refills: 0 | Status: SHIPPED
Start: 2020-10-16

## 2020-10-16 RX ORDER — OXYCODONE HYDROCHLORIDE 5 MG/1
2.5 TABLET ORAL
Qty: 15 TAB | Refills: 0 | Status: SHIPPED | OUTPATIENT
Start: 2020-10-16 | End: 2020-10-19

## 2020-10-16 RX ORDER — METOPROLOL TARTRATE 25 MG/1
12.5 TABLET, FILM COATED ORAL EVERY 12 HOURS
Status: DISCONTINUED | OUTPATIENT
Start: 2020-10-16 | End: 2020-10-16 | Stop reason: HOSPADM

## 2020-10-16 RX ORDER — MIDODRINE HYDROCHLORIDE 10 MG/1
10 TABLET ORAL
Qty: 90 TAB | Refills: 0 | Status: SHIPPED
Start: 2020-10-16 | End: 2020-11-15

## 2020-10-16 RX ORDER — METOPROLOL TARTRATE 25 MG/1
12.5 TABLET, FILM COATED ORAL EVERY 12 HOURS
Qty: 60 TAB | Refills: 0 | Status: SHIPPED
Start: 2020-10-16

## 2020-10-16 RX ORDER — BISMUTH SUBSALICYLATE 262 MG
1 TABLET,CHEWABLE ORAL DAILY
Qty: 30 TAB | Refills: 0 | Status: SHIPPED
Start: 2020-10-17

## 2020-10-16 RX ORDER — LEVOFLOXACIN 500 MG/1
500 TABLET, FILM COATED ORAL EVERY 24 HOURS
Qty: 3 TAB | Refills: 0 | Status: SHIPPED
Start: 2020-10-17 | End: 2020-10-20

## 2020-10-16 RX ORDER — ALOGLIPTIN 25 MG/1
25 TABLET, FILM COATED ORAL DAILY
Qty: 30 TAB | Refills: 0 | Status: SHIPPED
Start: 2020-10-17

## 2020-10-16 RX ORDER — ATORVASTATIN CALCIUM 20 MG/1
20 TABLET, FILM COATED ORAL
Qty: 30 TAB | Refills: 0 | Status: SHIPPED
Start: 2020-10-16

## 2020-10-16 RX ADMIN — RENO CAPS 1 CAPSULE: 100; 1.5; 1.7; 20; 10; 1; 150; 5; 6 CAPSULE ORAL at 08:06

## 2020-10-16 RX ADMIN — METOPROLOL TARTRATE 12.5 MG: 25 TABLET, FILM COATED ORAL at 13:21

## 2020-10-16 RX ADMIN — CLOPIDOGREL BISULFATE 75 MG: 75 TABLET ORAL at 08:06

## 2020-10-16 RX ADMIN — OXYCODONE HYDROCHLORIDE 5 MG: 5 TABLET ORAL at 13:21

## 2020-10-16 RX ADMIN — Medication 95 MG: at 08:21

## 2020-10-16 RX ADMIN — LEVOTHYROXINE SODIUM 88 MCG: 88 TABLET ORAL at 06:41

## 2020-10-16 RX ADMIN — DRONABINOL 5 MG: 2.5 CAPSULE ORAL at 08:06

## 2020-10-16 RX ADMIN — HEPARIN SODIUM 5000 UNITS: 10000 INJECTION, SOLUTION INTRAVENOUS; SUBCUTANEOUS at 06:43

## 2020-10-16 RX ADMIN — ALOGLIPTIN 25 MG: 25 TABLET, FILM COATED ORAL at 08:05

## 2020-10-16 RX ADMIN — EZETIMIBE 10 MG: 10 TABLET ORAL at 08:05

## 2020-10-16 RX ADMIN — LEVOFLOXACIN 500 MG: 500 TABLET, FILM COATED ORAL at 17:35

## 2020-10-16 RX ADMIN — MIDODRINE HYDROCHLORIDE 10 MG: 5 TABLET ORAL at 06:41

## 2020-10-16 RX ADMIN — DRONABINOL 5 MG: 2.5 CAPSULE ORAL at 17:34

## 2020-10-16 RX ADMIN — Medication 1 TABLET: at 08:05

## 2020-10-16 RX ADMIN — MIDODRINE HYDROCHLORIDE 10 MG: 5 TABLET ORAL at 17:35

## 2020-10-16 RX ADMIN — ACETAMINOPHEN 650 MG: 325 TABLET, FILM COATED ORAL at 17:45

## 2020-10-16 RX ADMIN — MIDODRINE HYDROCHLORIDE 10 MG: 5 TABLET ORAL at 12:21

## 2020-10-16 RX ADMIN — POTASSIUM CHLORIDE 20 MEQ: 1500 TABLET, EXTENDED RELEASE ORAL at 08:05

## 2020-10-16 RX ADMIN — HEPARIN SODIUM 5000 UNITS: 10000 INJECTION, SOLUTION INTRAVENOUS; SUBCUTANEOUS at 12:21

## 2020-10-16 RX ADMIN — MIDODRINE HYDROCHLORIDE 10 MG: 5 TABLET ORAL at 00:48

## 2020-10-16 RX ADMIN — Medication 95 MG: at 17:34

## 2020-10-16 RX ADMIN — MULTIVITAMIN TABLET 1 TABLET: TABLET at 08:05

## 2020-10-16 RX ADMIN — FERROUS SULFATE TAB 325 MG (65 MG ELEMENTAL FE) 325 MG: 325 (65 FE) TAB at 08:06

## 2020-10-16 RX ADMIN — PANTOPRAZOLE SODIUM 40 MG: 40 TABLET, DELAYED RELEASE ORAL at 08:06

## 2020-10-16 NOTE — PROGRESS NOTES
Comprehensive Nutrition Assessment Type and Reason for Visit: Reassess(goal) Nutrition Recommendations/Plan:  
Continue Diabetic diet Soft restriction added per pt preference Continue Ensure High Protein TID Food preferences added Add anti-emetics Nutrition Assessment:  Previously in ICU for pressors. BP now generally stable. Transferred to [facility] 9/28. D/c orders in x1 week, pending dispo plan for snf vs . Fair appetites but no recent intakes per EMR. Pt endorsed poor intakes recently 2/2 nausea with x3 episodes of emesis (none documented in EMR), stated even looking at food makes him nauseous. Pt also states food has no taste. Is contrasts report at last assessment when pt with good appetite and ate 50-75% of meals. Discussed with pt to find foods he can tolerate, RD to add these foods as preferences. ? If nausea medication related, pt unable to state if he started a new medication recently. RD discussed antiemetics, ginger ale, pt agreeable. Pt continues to endorses good intakes of Ensure HP, drinks at least 2/day. Labs: BG , H/H 9.6/29.5, BUN 23. Meds: Vit B complex with vitamin C, vitamin D3, docusate, marinol, FeSu, lasix, heparin, insulin, levofloxacin, levothyroxine, MVI, PPI, miralax, ranolazine, [place] Previously in ICU for pressors, BP now generally stable. Transferred to [facility] 9/28. D/c orders in x2week, plan to go to SNF today. Good appetites per EMR however per MD pt with no appetite, noted receiving marinol since 10/4. At last assessment pt endorsed poor intakes 2/2 nausea with x3 episodes of emesis, stated even looking at food makes him nauseous, as well as poor taste. RD discussed taking anti-emetics, ginger ale, etc. Previous to this pt with good appetites of 50-75% of meals. Today, pt ___, continues to drink Ensure HP well, still drinking ~2 daily. Labs: -144, Na 134.  Meds: Vit B complex with vitamin C, vitamin D3, docusate, marinol, FeSu, heparin, insulin, levofloxacin, levothyroxine, MVI, PPI, KCl. Malnutrition Assessment: 
Malnutrition Status:  Mild malnutrition Context:  Acute illness Findings of the 6 clinical characteristics of malnutrition:  
{Malnutrition Characteristics:05054} Estimated Daily Nutrient Needs: 
Energy (kcal):  1925kcal (20kcal/kg) Protein (g):  96kg (1.0g/kg) Fluid (ml/day):  1925mL (1mL/kcal) Needs for wt loss Nutrition Related Findings:  +N/V, no c/d. Noted abd distended, ? Ascites as pt with hx, could be cause of nausea. +1 sacral edema, improved. Pt previously denied dysphagia, today endorsed that having softer foods may help. Last BM 10/9. +N/V, no c/d. +1 BL upper and lower extremity edema, generalized non-pitting edema. Pt previously denied dysphagia however agreeable to softer foods, endorses __. Last BM 10/16, hard. Wounds: St 2 R-buttock, St 1 L buttock. MASD and friction partial thickness to buttick. Partial thickness distal pre-tibia Stage II(St 2 PU sacrum, LLL Blister s/p I&D) Current Nutrition Therapies: DIET NUTRITIONAL SUPPLEMENTS Breakfast, Dinner, Lunch; Ensure High Protein DIET DIABETIC CONSISTENT CARB Soft Solids Anthropometric Measures: 
· Height:  5' 6\" (167.6 cm) · Current Body Wt:  95.9 kg (211 lb 6.7 oz)(10/5) · Admission Body Wt:  206 lb 12.7 oz(9/25) · Usual Body Wt:  184 lb 15.5 oz · Ideal Body Wt:  142 lbs:  145.6 % · Adjusted Body Weight:  (90kg) · BMI Category:  Obese class 1 (BMI 30.0-34. 9) Wt hx: 95.9kg (10/16), 95.9kg (10/5), 93.8kg (9/25) Wt fluctuations inpatient likely 2//2 fluid Nutrition Diagnosis:  
· Inadequate energy intake related to (poor appetite (improving)) as evidenced by intake 51-75% Nutrition Interventions:  
Food and/or Nutrient Delivery: Continue current diet, Continue oral nutrition supplement Nutrition Education and Counseling: No recommendations at this time Coordination of Nutrition Care: Continued inpatient monitoring Goals: 
Meet >50% EENs via PO (goal not met, continue) Wt loss 1kg +/- 0.5kg per week (goal not met, continue) BG <180mg/dL (goal not met) Improve skin integrity  (goal not met) Nutrition Monitoring and Evaluation:  
Behavioral-Environmental Outcomes:   
Food/Nutrient Intake Outcomes: Food and nutrient intake Physical Signs/Symptoms Outcomes: Nausea/vomiting, Weight, Fluid status or edema, Skin, Biochemical data Discharge Planning:   
No discharge needs at this time Electronically signed by Quinton Barlow RD on 10/16/2020 at 9:48 AM 
 
Contact: Ext [phone]

## 2020-10-16 NOTE — DISCHARGE INSTRUCTIONS
Above-the-Knee Leg Amputation: What to Expect at Home  Your Recovery  An above-the-knee amputation is surgery to remove your leg above the knee. Your doctor removed the leg while keeping as much healthy bone, skin, blood vessel, and nerve tissue as possible. After an above-the-knee leg amputation, you will probably have bandages, a rigid dressing, or a cast over the remaining part of your leg (residual limb). The leg may be swollen for at least 4 weeks after your surgery. If you have a rigid dressing or cast, your doctor will set up regular visits to change the dressing or cast and check the healing. If you have elastic bandages, your doctor will tell you how to change them. You may have pain in your remaining limb. You also may think you have feeling or pain where your leg was. This is called phantom pain. It is common and may come and go for a year or longer. Your doctor can give you medicine for both types of pain. You may have already started a rehabilitation program (rehab). You will continue this under the guidance of your doctor or physical therapist. Abdoulaye Coulter will need to do a lot of work to recondition your muscles and relearn activities, balance, and coordination. Rehab can last as long as 1 year. You may have been fitted with a temporary artificial leg while you were still in the hospital. If this is the case, your doctor will teach you how to care for it. If you are getting an artificial leg, you may need to get used to it before you return to work and your other activities. You will probably not wear it all the time, so you will need to learn how to use a wheelchair, crutches, or other device. You will have to make changes in your home. Your workplace may be able to make allowances for you. Having your leg amputated is traumatic. Learning to live with new limitations can be hard and frustrating. You may feel depressed or grieve for your previous lifestyle.  It is important to understand these feelings. Talking with your family, friends, and health professionals about your frustrations is an important part of your recovery. You may also find that it helps to talk with a person who has had an amputation. Remember that even though losing a limb is difficult, it does not change who you are or prevent you from enjoying life. You will have to adapt and learn new ways to do things, but you will still be able to work and take part in sports and activities. And you can still learn, love, play, and live life to its fullest.  Many organizations can help you adjust to your new life. For example, you can go to [facility]. org for information and support. This care sheet gives you a general idea about how long it will take for you to recover. But each person recovers at a different pace. Follow the steps below to get better as quickly as possible. How can you care for yourself at home? Activity    · Be active. Talk to your doctor about what you can do. If you are active and use your remaining limb, it will heal faster.     · You may shower when your doctor okays it. Wash the remaining limb with soap and water, and pat it dry. You may need help doing this at first.     · You may need to adapt your car to your situation before you drive.     · You will probably be able to return to work and your usual routine when your remaining limb heals. This can be as soon as 4 to 8 weeks after surgery, but it may take longer. Diet    · You can eat your normal diet. If your stomach is upset, try bland, low-fat foods like plain rice, broiled chicken, toast, and yogurt.     · You may notice that your bowel movements are not regular right after your surgery. This is common. Try to avoid constipation and straining with bowel movements. Take a fiber supplement every day. If you have not had a bowel movement after a couple of days, ask your doctor about taking a mild laxative.    Medicines    · Your doctor will tell you if and when you can restart your medicines. He or she will also give you instructions about taking any new medicines.     · If you take aspirin or some other blood thinner, ask your doctor if and when to start taking it again. Make sure that you understand exactly what your doctor wants you to do.     · Take pain medicines exactly as directed. ? If the doctor gave you a prescription medicine for pain, take it as prescribed. ? If you are not taking a prescription pain medicine, ask your doctor if you can take an over-the-counter medicine.     · If you think your pain medicine is making you sick to your stomach:  ? Take your medicine after meals (unless your doctor has told you not to). ? Ask your doctor for a different pain medicine.     · If your doctor prescribed antibiotics, take them as directed. Do not stop taking them just because you feel better. You need to take the full course of antibiotics. Remaining limb care    · You may have bandages, a rigid dressing, or a cast on your remaining limb. Your doctor will tell you how to take care of it. Depending on your dressing and the doctor's instructions:  ? Check your remaining limb daily for irritation, skin breaks, and redness. Tell your doctor about any problems you see. ? Wash your remaining limb with mild soap and warm water every night. Pat it dry.     · If you have a temporary artificial leg, remove it before you go to sleep. Exercise    · Rehabilitation is a series of exercises you do after your surgery. This helps you learn to use your remaining limb and artificial leg. You will work with your doctor and physical therapist to plan this exercise program. To get the best results, you need to do the exercises correctly and as often and as long as your doctor tells you. Your rehab program will give you a number of exercises to do. Always do them as your therapist tells you. Other instructions    · Preventing contractures is very important.  A contracture occurs when a joint becomes stuck in one position. If this happens, it may be hard or impossible to straighten your remaining limb and use an artificial leg.  ? Make sure you put equal weight on both hips when you sit. Use firm chairs, and sit up straight. ? Keep your remaining limb flat with your legs together while you are lying on your back. ? Lie on your stomach as much as possible to stretch your hip joint. ? Do not sit for more than an hour or two. Stand, or lie on your stomach now and then. ? Do not put pillows under your hips or knees or between your thighs. Follow-up care is a key part of your treatment and safety. Be sure to make and go to all appointments, and call your doctor if you are having problems. It's also a good idea to know your test results and keep a list of the medicines you take. When should you call for help? Call 911 anytime you think you may need emergency care. For example, call if:    · You passed out (lost consciousness).     · You have chest pain, are short of breath, or you cough up blood. Call your doctor now or seek immediate medical care if:    · You have pain that does not get better after you take pain medicine.     · You are sick to your stomach or cannot drink fluids.     · You have loose stitches, or your incision comes open.     · You have signs of a blood clot in your leg (called a deep vein thrombosis), such as:  ? Pain in your calf, back of the knee, thigh, or groin. ? Redness or swelling in your leg.     · You have signs of infection, such as:  ? Increased pain, swelling, warmth, or redness. ? Red streaks leading from the incision. ? Pus draining from the incision. ? A fever.     · You bleed through your bandage. Watch closely for any changes in your health, and be sure to contact your doctor if you have any problems. Where can you learn more?   Go to http://www.gray.com/  Enter [id number] in the search box to learn more about \"Above-the-Knee Leg Amputation: What to Expect at Home. \"  Current as of: March 2, 2020               Content Version: 12.6  © 2006-2020 [employer]. Care instructions adapted under license by [employer] (which disclaims liability or warranty for this information). If you have questions about a medical condition or this instruction, always ask your healthcare professional. [address] any warranty or liability for your use of this information. Patient Education        Learning About Heart Failure Zones  What are heart failure zones? Heart failure zones give you an easy way to see changes in your heart failure symptoms. They also tell you when you need to get help. Check every day to see which zone you are in. Green zone. You are doing well. This is where you want to be. · Your weight is stable. It's not going up or down. · You breathe easily. · You are sleeping well. You are able to lie flat without shortness of breath. · You can do your usual activities. Yellow zone. Be careful. Your symptoms are changing. Call your doctor. · You have new or increased shortness of breath. · You are dizzy or lightheaded, or you feel like you may faint. · You have sudden weight gain, such as more than 2 to 3 pounds in a day or 5 pounds in a week. (Your doctor may suggest a different range of weight gain.)  · You have increased swelling in your legs, ankles, or feet. · You are so tired or weak that you can't do your usual activities. · You are not sleeping well. Shortness of breath wakes you up at night. You need extra pillows. Red zone. This is an emergency. Call 911. You have symptoms of sudden heart failure. For example:  · You have severe trouble breathing. · You cough up pink, foamy mucus. · You have a new irregular or fast heartbeat. You have symptoms of a heart attack.  These may include:  · Chest pain or pressure, or a strange feeling in the chest.  · Sweating. · Shortness of breath. · Nausea or vomiting. · Pain, pressure, or a strange feeling in the back, neck, jaw, or upper belly or in one or both shoulders or arms. · Lightheadedness or sudden weakness. · A fast or irregular heartbeat. If you have symptoms of a heart attack: After you call 911, the  may tell you to chew 1 adult-strength or 2 to 4 low-dose aspirin. Wait for an ambulance. Do not try to drive yourself. Follow-up care is a key part of your treatment and safety. Be sure to make and go to all appointments, and call your doctor if you are having problems. It's also a good idea to know your test results and keep a list of the medicines you take. Where can you learn more? Go to http://www.gray.com/  Enter T174 in the search box to learn more about \"Learning About Heart Failure Zones. \"  Current as of: December 16, 2019               Content Version: 12.6  © 3015-8394 [employer]. Care instructions adapted under license by [employer] (which disclaims liability or warranty for this information). If you have questions about a medical condition or this instruction, always ask your healthcare professional. [place] [address] any warranty or liability for your use of this information. Patient Education        Heart Failure: Care Instructions  Your Care Instructions     Heart failure occurs when your heart does not pump as much blood as the body needs. Failure does not mean that the heart has stopped pumping but rather that it is not pumping as well as it should. Over time, this causes fluid buildup in your lungs and other parts of your body. Fluid buildup can cause shortness of breath, fatigue, swollen ankles, and other problems. By taking medicines regularly, reducing sodium (salt) in your diet, checking your weight every day, and making lifestyle changes, you can feel better and live longer.   Follow-up care is a key part of your treatment and safety. Be sure to make and go to all appointments, and call your doctor if you are having problems. It's also a good idea to know your test results and keep a list of the medicines you take. How can you care for yourself at home? Medicines    · Be safe with medicines. Take your medicines exactly as prescribed. Call your doctor if you think you are having a problem with your medicine.     · Do not take any vitamins, over-the-counter medicine, or herbal products without talking to your doctor first. Carli Gomez not take ibuprofen (Advil or Motrin) and naproxen (Aleve) without talking to your doctor first. They could make your heart failure worse.     · You may take some of the following medicine. ? Angiotensin-converting enzyme inhibitors (ACEIs) or angiotensin II receptor blockers (ARBs) reduce the heart's workload, lower blood pressure, and reduce swelling. Taking an ACEI or ARB may lower your chance of needing to be hospitalized. ? Beta-blockers can slow heart rate, decrease blood pressure, and improve your condition. Taking a beta-blocker may lower your chance of needing to be hospitalized. ? Diuretics, also called water pills, reduce swelling. You will get more details on the specific medicines your doctor prescribes. Diet    · Your doctor may suggest that you limit sodium. Your doctor can tell you how much sodium is right for you. An example is less than 3,000 mg a day. This includes all the salt you eat in cooking or in packaged foods. People get most of their sodium from processed foods. Fast food and restaurant meals also tend to be very high in sodium.     · Ask your doctor how much liquid you can drink each day. You may have to limit liquids. Weight    · Weigh yourself without clothing at the same time each day. Record your weight. Call your doctor if you have a sudden weight gain, such as more than 2 to 3 pounds in a day or 5 pounds in a week.  (Your doctor may suggest a different range of weight gain.) A sudden weight gain may mean that your heart failure is getting worse. Activity level    · Start light exercise (if your doctor says it is okay). Even if you can only do a small amount, exercise will help you get stronger, have more energy, and manage your weight and your stress. Walking is an easy way to get exercise. Start out by walking a little more than you did before. Bit by bit, increase the amount you walk.     · When you exercise, watch for signs that your heart is working too hard. You are pushing yourself too hard if you cannot talk while you are exercising. If you become short of breath or dizzy or have chest pain, stop, sit down, and rest.     · If you feel \"wiped out\" the day after you exercise, walk slower or for a shorter distance until you can work up to a better pace.     · Get enough rest at night. Sleeping with 1 or 2 pillows under your upper body and head may help you breathe easier. Lifestyle changes    · Do not smoke. Smoking can make a heart condition worse. If you need help quitting, talk to your doctor about stop-smoking programs and medicines. These can increase your chances of quitting for good. Quitting smoking may be the most important step you can take to protect your heart.     · Limit alcohol to 2 drinks a day for men and 1 drink a day for women. Too much alcohol can cause health problems.     · Avoid getting sick from colds and the flu. Get a pneumococcal vaccine shot. If you have had one before, ask your doctor whether you need another dose. Get a flu shot each year. If you must be around people with colds or the flu, wash your hands often. When should you call for help? Call 911 if you have symptoms of sudden heart failure such as:    · You have severe trouble breathing.     · You cough up pink, foamy mucus.     · You have a new irregular or rapid heartbeat.    Call your doctor now or seek immediate medical care if:    · You have new or increased shortness of breath.     · You are dizzy or lightheaded, or you feel like you may faint.     · You have sudden weight gain, such as more than 2 to 3 pounds in a day or 5 pounds in a week. (Your doctor may suggest a different range of weight gain.)     · You have increased swelling in your legs, ankles, or feet.     · You are suddenly so tired or weak that you cannot do your usual activities. Watch closely for changes in your health, and be sure to contact your doctor if you develop new symptoms. Where can you learn more? Go to http://www.gray.com/  Enter F926 in the search box to learn more about \"Heart Failure: Care Instructions. \"  Current as of: December 16, 2019               Content Version: 12.6  © 7109-5403 [employer]. Care instructions adapted under license by [employer] (which disclaims liability or warranty for this information). If you have questions about a medical condition or this instruction, always ask your healthcare professional. [place] [address] any warranty or liability for your use of this information. Patient Education        Learning About Self-Care for Heart Failure  What is self-care for heart failure? Heart failure usually gets worse over time. But there are many things you can do to feel better, avoid the hospital, and live longer. Self-care means managing your health by doing certain things every day, like weighing yourself. It's about knowing which symptoms to watch for so you can avoid getting worse. When you practice good self-care, you know when it's time to call your doctor and when your heart failure has turned into an emergency. The list below can help. Top 5 self-care tips for every day   1. Take your medicines as prescribed. This gives them the best chance of helping you. 2. Weigh yourself every day. This helps you watch for signs that you're getting worse.  Weight gain may be a sign that your body is holding on to too much fluid. Weigh yourself at the same time each day, using the same scale, on a hard, flat surface. The best time is in the morning after you go to the bathroom and before you eat or drink anything. 3. Keep a daily record of your symptoms. Checking your symptoms helps you see what symptoms are normal for you and if they change or get worse. 4. Limit sodium. This helps keep fluid from building up and may help you feel better. Your doctor can tell you how much sodium is right for you. An example is less than 3,000 milligrams (mg) a day. Try limiting the salt you eat at home, and by watching for \"hidden\" sodium when you eat out or shop for food. 5. Try to exercise throughout the week. Exercise makes your heart stronger and can help you avoid symptoms. Walking is a great way to get exercise. If your doctor says it's safe, start out with some short walks. Then slowly build up to longer ones. Some people with heart failure also may need to limit how much fluid they drink each day. Ask your doctor if this is true for you and, if it is, how much fluid is safe for you to drink each day. When should you act? Try to become familiar with signs that mean your heart failure is getting worse. If you need help, talk with your doctor about making a personal plan. Here are some things to watch for as you practice your daily self-care. Call your doctor if:  · You have sudden weight gain, such as more than 2 to 3 pounds in a day or 5 pounds in a week. (Your doctor may suggest a different range of weight gain.)  · You have new or worse swelling in your feet, ankles, or legs. · Your breathing gets worse. Activities that did not make you short of breath before are hard for you now. · Your breathing when you lie down is worse than usual, or you wake up at night needing to catch your breath. Be sure to make and go to all of your follow-up appointments.  And it's always a good idea to call your doctor anytime you have a sudden change in symptoms. When is it an emergency? Sometimes the symptoms get worse very quickly. This is called sudden heart failure. It causes fluid to build up in your lungs. Sudden heart failure is an emergency. If you have any of these symptoms, you need care right away. Call 911 if:   · You have severe shortness of breath. · You have an irregular or fast heartbeat. · You cough up foamy, pink mucus. What else can you do to stay healthy? There are other things you can do to take care of your body and your heart. These things will help you feel better. And they will also reduce your risk of heart attack and stroke. · Try to stay at a healthy weight. Eat a healthy diet with lots of fresh fruit, vegetables, and whole grains. · If you smoke, quit. · Limit the amount of alcohol you drink. · Keep high blood pressure and diabetes under control. If you need help, talk with your doctor. If your doctor has not set you up with a cardiac rehabilitation (rehab) program, talk to him or her about whether that is right for you. Cardiac rehab includes exercise, help with diet and lifestyle changes, and emotional support. Also let your doctor know if:  · You're having trouble sleeping. Sleep is important to your well-being. It also helps your heart work the way it's supposed to. Your doctor can help you decide if you need treatment for sleep problems. · You're feeling sad or hopeless much of the time, or you are worried and anxious. Heart failure can be hard on your emotions. Treatment with counseling and medicine can help. And when you feel better, you're more likely to take care of yourself. · You think you may have a problem with alcohol or drug use. You and your doctor can decide if you have a problem and what type of treatment might help you. Follow-up care is a key part of your treatment and safety.  Be sure to make and go to all appointments, and call your doctor if you are having problems. It's also a good idea to know your test results and keep a list of the medicines you take. Where can you learn more? Go to http://www.gray.com/  Enter H262 in the search box to learn more about \"Learning About Self-Care for Heart Failure. \"  Current as of: December 16, 2019               Content Version: 12.6  © 8332-6518 [employer]. Care instructions adapted under license by [employer] (which disclaims liability or warranty for this information). If you have questions about a medical condition or this instruction, always ask your healthcare professional. [place] [address] any warranty or liability for your use of this information.

## 2020-10-16 NOTE — PROGRESS NOTES
Problem: Self Care Deficits Care Plan (Adult)  Goal: *Acute Goals and Plan of Care (Insert Text)  Description: Pt will be SBA sup <> sit in prep for EOB ADLs  Pt will be Mod I grooming sitting EOB  Pt will be Mod I LE dressing sitting EOB/long sit  Pt will be SBA sit <>  prep for toileting LRAD  Pt will be SBA toileting/toilet transfer/cloth mgmt LRAD  Pt will be IND following UE HEP in prep for self care tasks     Outcome: Progressing Towards Goal   OCCUPATIONAL THERAPY TREATMENT  Patient: Rosaura Avendaño (2 y.o. male)  Date: 10/16/2020  Diagnosis: Heart failure ([facility] [I50.9]  Acute exacerbation of CHF (congestive heart failure) ([facility]) [I50.9]  Adult failure to thrive [R62.7]  Nausea & vomiting [R11.2]  Hypotension [I95.9]   <principal problem not specified>       Precautions:    Chart, occupational therapy assessment, plan of care, and goals were reviewed. ASSESSMENT  Patient continues with skilled OT services and is progressing towards goals. Pt semi supine in bed upon arrival and agreeable to session. Pt Performed bed mobility with SBA. Pt required min A x 2 for sit-> stand. Pt. Performed bed-> chair transfer with RW and CGAx2, encouraged to continue further ambulation to perform toilet transfer, however patient declined stating he could not walk any further than the chair. Pt. Performed UE therEX while seated in chair to increase strength and endurance. PLAN :  Patient continues to benefit from skilled intervention to address the above impairments. Continue treatment per established plan of care. to address goals.   Recommendation for discharge: (in order for the patient to meet his/her long term goals)  Therapy up to 5 days/week in SNF setting    This discharge recommendation:  Has been made in collaboration with the attending provider and/or case management    IF patient discharges home will need the following DME:TBD       SUBJECTIVE:   Patient stated I cant go any further.     OBJECTIVE DATA SUMMARY:   Cognitive/Behavioral Status:  Neurologic State: Alert  Orientation Level: Oriented X4  Cognition: Appropriate decision making; Appropriate for age attention/concentration    Functional Mobility and Transfers for ADLs:  Bed Mobility:  Supine to Sit: Stand-by assistance  Sit to Supine: Stand-by assistance  Scooting: Stand-by assistance    Transfers:  Sit to Stand: Minimum assistance;Assist x2          Balance:  Sitting: Intact  Standing: Impaired    Therapeutic Exercises:   UE therEX  20 reps ( should flex/ex, elbow flex/ex, horizontal ab/ad) with resistive weight in dulce UE's/     Pain:  0/0 pain reported    Activity Tolerance:   Poor and requires frequent rest breaks  Please refer to the flowsheet for vital signs taken during this treatment. After treatment patient left in no apparent distress:   Sitting in chair, Heels elevated for pressure relief and Call bell within reach    COMMUNICATION/COLLABORATION:   The patients plan of care was discussed with: Physical therapy assistant   Co-tx with PTA for increased safety with mobility and transfers.      [employer]  Time Calculation: 23 mins

## 2020-10-16 NOTE — PROGRESS NOTES
PHYSICAL THERAPY TREATMENT  Patient: Vandana Age (58 y.o. male)  Date: 10/16/2020  Diagnosis: Heart failure (HCC) [I50.9]  Acute exacerbation of CHF (congestive heart failure) ([facility] [I50.9]  Adult failure to thrive [R62.7]  Nausea & vomiting [R11.2]  Hypotension [I95.9]   <principal problem not specified>       Precautions:    Chart, physical therapy assessment, plan of care and goals were reviewed. ASSESSMENT  Patient continues with skilled PT services and is progressing towards goals. Pt semi supine in bed upon arrival and agreeable to session. Performed bed mobility with SBA. STS with min A x 2. Amb to chair with RW and CGAx2, attempted further ambulation however patient declined stating he could not walk any further than the chair. Performed seated TE, see details below. PLAN :  Patient continues to benefit from skilled intervention to address the above impairments. Continue treatment per established plan of care. to address goals.     Recommendation for discharge: (in order for the patient to meet his/her long term goals)  Therapy up to 5 days/week in SNF setting    This discharge recommendation:  Has been made in collaboration with the attending provider and/or case management    IF patient discharges home will need the following DME: patient owns DME required for discharge       SUBJECTIVE:   Patient stated I can't walk any further than the chair    OBJECTIVE DATA SUMMARY:   Critical Behavior:  Neurologic State: Alert  Orientation Level: Oriented X4  Cognition: Appropriate decision making, Appropriate for age attention/concentration, Appropriate safety awareness, Follows commands     Functional Mobility Training:  Bed Mobility:     Supine to Sit: Stand-by assistance  Sit to Supine: Stand-by assistance  Scooting: Stand-by assistance        Transfers:  Sit to Stand: Minimum assistance;Assist x2  Stand to Sit: Minimum assistance;Assist x2          Balance:  Sitting: Intact  Standing: Impaired  Ambulation/Gait Training:  Distance (ft): 3 Feet (ft)  Ambulation - Level of Assistance: Contact guard assistance;Assist x2  Speed/Zulma: Slow  Step Length: Left shortened;Right shortened         Therapeutic Exercises:       EXERCISE   Sets   Reps   Active Active Assist   Passive Self ROM   Comments   Ankle Pumps 1 20 [x] [] [] []    Hip abd/add 1 20 [x] [] [] []    Long Arc Quads 1 12 [x] [] [] []    Marching 1 20 [x] [] [] []       [] [] [] []        Pain Rating:  Reports having no pain    Activity Tolerance:   Poor and observed SOB with activity  Please refer to the flowsheet for vital signs taken during this treatment. After treatment patient left in no apparent distress:   Sitting in chair and Call bell within reach    COMMUNICATION/COLLABORATION:   The patients plan of care was discussed with: Occupational therapy assistant. OT/PT sessions occurred together for increased patient and clinician safety    Problem: Mobility Impaired (Adult and Pediatric)  Goal: *Acute Goals and Plan of Care (Insert Text)  Description: Pt will be I with LE HEP in 7 days. Pt will perform bed mobility with min A x1 in 7 days. Pt will perform transfers with min Ax1 in 7 days. Pt will amb 10-25 feet with LRAD safely with mod I in 7 days.          Outcome: Progressing Towards Goal       Leslie Cronin PTA   Time Calculation: 23 mins

## 2020-10-16 NOTE — PROGRESS NOTES
Patient is clear to d/c to [facility] room [facility], nurse report can be called to [phone]. CM attempted to contact patient's daughter, Momo Mac [phone], to notify of DC and deliver IMM however there was no answer at this time. VM was left detailing d/c.  CM has notified nurse of DC info.

## 2020-10-16 NOTE — PROGRESS NOTES
Discharge Note  Patient d/c to [facility] room 105 report called to [facility] LPN Transport packet included Patient tele d/c no IV at time of d/c.  [facility] patient off unit via stretcher

## 2020-10-16 NOTE — DISCHARGE SUMMARY
Physician Discharge Summary     Patient ID:    Vandana Age  [id number]  70 y.o.  1940    Admit date: 9/22/2020    Discharge date : 10/16/2020    Chronic Diagnoses:    Problem List as of 10/16/2020 Date Reviewed: 9/23/2020          Codes Class Noted - Resolved    Cardiomyopathy ([facility] ICD-10-CM: I42.9  ICD-9-CM: 425.4  5/17/2019 - Present        Heart failure ([facility] ICD-10-CM: I50.9  ICD-9-CM: 428.9  9/23/2020 - Present        Adult failure to thrive ICD-10-CM: R62.7  ICD-9-CM: 783.7  9/23/2020 - Present        Nausea & vomiting ICD-10-CM: R11.2  ICD-9-CM: 787.01  9/23/2020 - Present        Hypotension ICD-10-CM: I95.9  ICD-9-CM: 458.9  9/23/2020 - Present        Acute exacerbation of CHF (congestive heart failure) ([facility] ICD-10-CM: I50.9  ICD-9-CM: 428.0  9/23/2020 - Present        Ischemic cardiomyopathy ICD-10-CM: I25.5  ICD-9-CM: 414.8  4/23/2019 - Present        Left bundle branch block ICD-10-CM: I44.7  ICD-9-CM: 426.3  4/23/2019 - Present          22    Final Diagnoses:   Heart failure ([facility] [I50.9]  Acute exacerbation of CHF (congestive heart failure) ([facility]) [I50.9]  Adult failure to thrive [R62.7]  Nausea & vomiting [R11.2]  Hypotension [I95.9]    Reason for Hospitalization:  Decompensated heart failure          Hospital Course:   79 y.o. male with a history of diabetes, ischemic cardiomyopathy, hypertension presents to the emergency room from [address] [place] [address] with complaining of increasing shortness of breath, blood on his lower extremity. Fatigue. Has been at snf stating poor response to therapy. Admitted 2/2 decompensated chf + bullous lesion LL leg. Cardio followed, diuresed, resumed pta meds. Developed hypotension, transiently in icu, dobutamine and levophed transiently suspecting cardiogenic shock. Compensated and out of icu. CYNDI on presentation resolved. Echo showed low nl ef.    Surgery saw for left lower leg bullous lesion, debrided and wound care followed. Covid not detected. BP remained low, midodrine added and ultimately off metoprolol and lisinopril as bp not tolerant. BP ultimately remaining adequate. Metoprolol 12.5 mg resumed. UTI confirmed with ucs growing pan sensitive E Faecalis, completing course of levaquin. Fatigue/asthenia predominant complaint, initially family declined snf, dme/wheelchair arranged for potential home with [facility] though hospital stay delayed by persistent hypotension. Patient and family eventually agreed SNF best option at this point. Over all very debilitated, prognosis long term poor. Appetite remained limited, added marinol and yet to see significant changes, rec nutritional supplements, encouragement. Discharge Medications:   Current Discharge Medication List      START taking these medications    Details   alogliptin (NESINA) 25 mg tablet Take 1 Tab by mouth daily. Qty: 30 Tab, Refills: 0      dronabinoL (MARINOL) 5 mg capsule Take 1 Cap by mouth Before breakfast and dinner. Max Daily Amount: 10 mg.  Qty: 60 Cap, Refills: 0    Associated Diagnoses: Adult failure to thrive      levoFLOXacin (LEVAQUIN) 500 mg tablet Take 1 Tab by mouth every twenty-four (24) hours for 3 days. Qty: 3 Tab, Refills: 0      midodrine (PROAMATINE) 10 mg tablet Take 1 Tab by mouth three (3) times daily (with meals) for 30 days. Indications: hypotension  Qty: 90 Tab, Refills: 0    Comments: Hold for sbp>130 mmhg      multivitamin (ONE A DAY) tablet Take 1 Tab by mouth daily. Qty: 30 Tab, Refills: 0      oxyCODONE IR (ROXICODONE) 5 mg immediate release tablet Take 0.5 Tabs by mouth two (2) times daily as needed for Pain for up to 3 days. Max Daily Amount: 5 mg. Qty: 15 Tab, Refills: 0    Associated Diagnoses: Skin lesion of left leg; PVD (peripheral vascular disease) (HCC)      metoprolol tartrate (LOPRESSOR) 25 mg tablet Take 0.5 Tabs by mouth every twelve (12) hours.   Qty: 60 Tab, Refills: 0      b complex-vitamin c-folic acid (NEPHROCAPS) 1 mg capsule Take 1 Cap by mouth daily. Qty: 30 Cap, Refills: 0      docusate sodium (COLACE) 100 mg capsule Take 1 Cap by mouth daily for 90 days. Qty: 30 Cap, Refills: 2      polyethylene glycol (MIRALAX) 17 gram packet Take 1 Packet by mouth daily as needed for Constipation. Qty: 30 Packet, Refills: 0      potassium chloride (K-DUR, KLOR-CON) 20 mEq tablet Take 1 Tab by mouth daily. Qty: 30 Tab, Refills: 0         CONTINUE these medications which have CHANGED    Details   atorvastatin (LIPITOR) 20 mg tablet Take 1 Tab by mouth nightly. Qty: 30 Tab, Refills: 0         CONTINUE these medications which have NOT CHANGED    Details   clopidogrel (PLAVIX) 75 mg tab TAKE ONE TABLET BY MOUTH ONE TIME DAILY  Refills: 3      JANUVIA 100 mg tablet TAKE ONE TABLET BY MOUTH ONE TIME DAILY  Refills: 0      ezetimibe (ZETIA) 10 mg tablet TAKE ONE TABLET BY MOUTH AT BEDTIME  Refills: 1      ranolazine ER (RANEXA) 1,000 mg TAKE ONE TABLET BY MOUTH TWICE A DAY  Refills: 4      levothyroxine (SYNTHROID) 88 mcg tablet TAKE ONE TABLET BY MOUTH EVERY MORNING  Refills: 1      cyanocobalamin (VITAMIN B12) 1,000 mcg/mL injection INJECT 1 ML SUBCUTANEOUSLY ONCE MONTHLY  Refills: 5      ferrous sulfate 325 mg (65 mg iron) tablet TAKE ONE TABLET BY MOUTH TWICE A DAY  Refills: 1      furosemide (LASIX) 40 mg tablet TAKE ONE TABLET BY MOUTH ONE TIME DAILY  Refills: 1      pantoprazole (PROTONIX) 40 mg tablet TAKE ONE TABLET BY MOUTH EVERY MORNING  Refills: 1      nitroglycerin (NITROSTAT) 0.4 mg SL tablet 0.4 mg by SubLINGual route every five (5) minutes as needed for Chest Pain. Up to 3 doses. cholecalciferol (VITAMIN D3) 1,000 unit tablet Take  by mouth daily. insulin detemir (LEVEMIR FLEXTOUCH U-100 INSULN SC) by SubCUTAneous route.  Indications: as needed for BS > 150 once a day then take 10 units         STOP taking these medications       metoprolol succinate (TOPROL-XL) 25 mg XL tablet Comments:   Reason for Stopping:         lisinopril (PRINIVIL, ZESTRIL) 5 mg tablet Comments:   Reason for Stopping: Follow up Care:    1. Ashley Perdomo MD in 1-2 weeks. Please call to set up an appointment shortly after discharge. 2. Cardiology Dr Denny Peace 2 weeks    Diet:  Diabetic Diet and soft/cardiac. Fluid restrict 1500 ml daily. Nutritional supplements with meals. Activity: OOB with assist, OOB for all meals, wheelchair at bedside. Requires full assist at this point. Pt/Ot evals. Wound care: For LL leg: Cleanse wound with normal saline, apply xeroform petrolatum dressing, cover with gauze and secure with kerlix daily and prn if becomes soiled. For sacral area, clease, decub precautions, barrier protect. Wound care to follow. Off load heels while in bed. Continue ac/hs accuchecks,     Disposition:  SNF. Advanced Directive:   FULL x   DNR      Discharge Exam:  See today's note. CONSULTATIONS: Cardiology and General Surgery    Significant Diagnostic Studies:   9/22/2020: BUN 27 mg/dL* (Ref range: 6 - 20 mg/dL); Calcium 9.1 mg/dL (Ref range: 8.5 - 10.1 mg/dL); CO2 28 mmol/L (Ref range: 21 - 32 mmol/L); Creatinine 1.87 mg/dL* (Ref range: 0.70 - 1.30 mg/dL); Glucose 209 mg/dL* (Ref range: 65 - 100 mg/dL); HCT 34.4 %* (Ref range: 36.6 - 50.3 %); HGB 11.3 %* (Ref range: 12.1 - 17.0 %); Potassium 4.3 mmol/L (Ref range: 3.5 - 5.1 mmol/L); Sodium 136 mmol/L (Ref range: 136 - 145 mmol/L)  Recent Labs     10/15/20  1015   WBC 8.0   HGB 10.7*   HCT 33.7*   *     Recent Labs     10/15/20  1015   *   K 4.3      CO2 27   BUN 12   CREA 0.92   *   CA 10.0     No results for input(s): ALT, AP, TBIL, TBILI, TP, ALB, GLOB, GGT, AML, LPSE in the last 72 hours. No lab exists for component: SGOT, GPT, AMYP, HLPSE  No results for input(s): INR, PTP, APTT, INREXT in the last 72 hours. No results for input(s): FE, TIBC, PSAT, FERR in the last 72 hours.    No results for input(s): PH, PCO2, PO2 in the last 72 hours. No results for input(s): CPK, CKMB in the last 72 hours. No lab exists for component: TROPONINI  Lab Results   Component Value Date/Time    Glucose (POC) 158 (H) 10/16/2020 11:26 AM    Glucose (POC) 131 (H) 10/16/2020 08:05 AM    Glucose (POC) 113 (H) 10/15/2020 09:44 PM    Glucose (POC) 126 (H) 10/15/2020 03:25 PM    Glucose (POC) 144 (H) 10/15/2020 11:18 AM           Signed:  Lorraine Wilde MD  10/16/2020  12:13 PM    Time 40 minutes.

## 2020-10-22 ENCOUNTER — APPOINTMENT (OUTPATIENT)
Dept: ULTRASOUND IMAGING | Age: 80
End: 2020-10-22
Attending: INTERNAL MEDICINE
Payer: MEDICARE

## 2020-10-22 ENCOUNTER — APPOINTMENT (OUTPATIENT)
Dept: NON INVASIVE DIAGNOSTICS | Age: 80
End: 2020-10-22
Attending: INTERNAL MEDICINE
Payer: MEDICARE

## 2020-10-22 ENCOUNTER — APPOINTMENT (OUTPATIENT)
Dept: GENERAL RADIOLOGY | Age: 80
End: 2020-10-22
Attending: EMERGENCY MEDICINE
Payer: MEDICARE

## 2020-10-22 ENCOUNTER — HOSPITAL ENCOUNTER (OUTPATIENT)
Age: 80
Setting detail: OBSERVATION
Discharge: NURSING, REHAB OR LONG-TERM CARE | End: 2020-10-23
Attending: EMERGENCY MEDICINE | Admitting: INTERNAL MEDICINE
Payer: MEDICARE

## 2020-10-22 DIAGNOSIS — G62.89 OTHER POLYNEUROPATHY: ICD-10-CM

## 2020-10-22 DIAGNOSIS — R07.9 ACUTE CHEST PAIN: Primary | ICD-10-CM

## 2020-10-22 DIAGNOSIS — I50.9 ACUTE ON CHRONIC CONGESTIVE HEART FAILURE, UNSPECIFIED HEART FAILURE TYPE (HCC): ICD-10-CM

## 2020-10-22 LAB
ALBUMIN SERPL-MCNC: 2.7 G/DL (ref 3.5–5)
ALBUMIN/GLOB SERPL: 0.6 {RATIO} (ref 1.1–2.2)
ALP SERPL-CCNC: 131 U/L (ref 45–117)
ALT SERPL-CCNC: 20 U/L (ref 12–78)
ANION GAP SERPL CALC-SCNC: 9 MMOL/L (ref 5–15)
AST SERPL W P-5'-P-CCNC: 27 U/L (ref 15–37)
BASOPHILS # BLD: 0 K/UL (ref 0–0.1)
BASOPHILS NFR BLD: 0 % (ref 0–1)
BILIRUB SERPL-MCNC: 1.2 MG/DL (ref 0.2–1)
BNP SERPL-MCNC: 3678 PG/ML
BUN SERPL-MCNC: 24 MG/DL (ref 6–20)
BUN/CREAT SERPL: 24 (ref 12–20)
CA-I BLD-MCNC: 9.2 MG/DL (ref 8.5–10.1)
CHLORIDE SERPL-SCNC: 100 MMOL/L (ref 97–108)
CO2 SERPL-SCNC: 27 MMOL/L (ref 21–32)
CREAT SERPL-MCNC: 1.02 MG/DL (ref 0.7–1.3)
DIFFERENTIAL METHOD BLD: ABNORMAL
EOSINOPHIL # BLD: 0.3 K/UL (ref 0–0.4)
EOSINOPHIL NFR BLD: 2 % (ref 0–7)
ERYTHROCYTE [DISTWIDTH] IN BLOOD BY AUTOMATED COUNT: 18.3 % (ref 11.5–14.5)
GLOBULIN SER CALC-MCNC: 4.7 G/DL (ref 2–4)
GLUCOSE BLD STRIP.AUTO-MCNC: 195 MG/DL (ref 65–100)
GLUCOSE SERPL-MCNC: 123 MG/DL (ref 65–100)
HCT VFR BLD AUTO: 31.5 % (ref 36.6–50.3)
HGB BLD-MCNC: 10.2 G/DL (ref 12.1–17)
IMM GRANULOCYTES # BLD AUTO: 0.1 K/UL (ref 0–0.04)
IMM GRANULOCYTES NFR BLD AUTO: 1 % (ref 0–0.5)
LYMPHOCYTES # BLD: 1.1 K/UL (ref 0.8–3.5)
LYMPHOCYTES NFR BLD: 10 % (ref 12–49)
MCH RBC QN AUTO: 27.4 PG (ref 26–34)
MCHC RBC AUTO-ENTMCNC: 32.4 G/DL (ref 30–36.5)
MCV RBC AUTO: 84.7 FL (ref 80–99)
MONOCYTES # BLD: 1.2 K/UL (ref 0–1)
MONOCYTES NFR BLD: 10 % (ref 5–13)
NEUTS SEG # BLD: 8.9 K/UL (ref 1.8–8)
NEUTS SEG NFR BLD: 77 % (ref 32–75)
PERFORMED BY, TECHID: ABNORMAL
PLATELET # BLD AUTO: 341 K/UL (ref 150–400)
PMV BLD AUTO: 10.2 FL (ref 8.9–12.9)
POTASSIUM SERPL-SCNC: 3.7 MMOL/L (ref 3.5–5.1)
PROT SERPL-MCNC: 7.4 G/DL (ref 6.4–8.2)
RBC # BLD AUTO: 3.72 M/UL (ref 4.1–5.7)
SODIUM SERPL-SCNC: 136 MMOL/L (ref 136–145)
TROPONIN I SERPL-MCNC: <0.05 NG/ML
WBC # BLD AUTO: 11.5 K/UL (ref 4.1–11.1)

## 2020-10-22 PROCEDURE — 93306 TTE W/DOPPLER COMPLETE: CPT

## 2020-10-22 PROCEDURE — 96375 TX/PRO/DX INJ NEW DRUG ADDON: CPT

## 2020-10-22 PROCEDURE — 99218 HC RM OBSERVATION: CPT

## 2020-10-22 PROCEDURE — 84484 ASSAY OF TROPONIN QUANT: CPT

## 2020-10-22 PROCEDURE — 96372 THER/PROPH/DIAG INJ SC/IM: CPT

## 2020-10-22 PROCEDURE — 96374 THER/PROPH/DIAG INJ IV PUSH: CPT

## 2020-10-22 PROCEDURE — 85025 COMPLETE CBC W/AUTO DIFF WBC: CPT

## 2020-10-22 PROCEDURE — 93005 ELECTROCARDIOGRAM TRACING: CPT

## 2020-10-22 PROCEDURE — 36415 COLL VENOUS BLD VENIPUNCTURE: CPT

## 2020-10-22 PROCEDURE — 82962 GLUCOSE BLOOD TEST: CPT

## 2020-10-22 PROCEDURE — 51702 INSERT TEMP BLADDER CATH: CPT

## 2020-10-22 PROCEDURE — 74011250637 HC RX REV CODE- 250/637: Performed by: INTERNAL MEDICINE

## 2020-10-22 PROCEDURE — 76770 US EXAM ABDO BACK WALL COMP: CPT

## 2020-10-22 PROCEDURE — 74011250636 HC RX REV CODE- 250/636: Performed by: INTERNAL MEDICINE

## 2020-10-22 PROCEDURE — 99285 EMERGENCY DEPT VISIT HI MDM: CPT

## 2020-10-22 PROCEDURE — 80053 COMPREHEN METABOLIC PANEL: CPT

## 2020-10-22 PROCEDURE — 96376 TX/PRO/DX INJ SAME DRUG ADON: CPT

## 2020-10-22 PROCEDURE — 71045 X-RAY EXAM CHEST 1 VIEW: CPT

## 2020-10-22 PROCEDURE — 74011000258 HC RX REV CODE- 258: Performed by: INTERNAL MEDICINE

## 2020-10-22 PROCEDURE — 83880 ASSAY OF NATRIURETIC PEPTIDE: CPT

## 2020-10-22 RX ORDER — MAGNESIUM SULFATE 100 %
4 CRYSTALS MISCELLANEOUS AS NEEDED
Status: DISCONTINUED | OUTPATIENT
Start: 2020-10-22 | End: 2020-10-23 | Stop reason: HOSPADM

## 2020-10-22 RX ORDER — ACETAMINOPHEN 325 MG/1
650 TABLET ORAL
Status: DISCONTINUED | OUTPATIENT
Start: 2020-10-22 | End: 2020-10-23 | Stop reason: HOSPADM

## 2020-10-22 RX ORDER — DRONABINOL 2.5 MG/1
5 CAPSULE ORAL
Status: DISCONTINUED | OUTPATIENT
Start: 2020-10-22 | End: 2020-10-23 | Stop reason: HOSPADM

## 2020-10-22 RX ORDER — NITROGLYCERIN 0.4 MG/1
0.4 TABLET SUBLINGUAL
Status: DISCONTINUED | OUTPATIENT
Start: 2020-10-22 | End: 2020-10-23 | Stop reason: HOSPADM

## 2020-10-22 RX ORDER — FUROSEMIDE 10 MG/ML
40 INJECTION INTRAMUSCULAR; INTRAVENOUS DAILY
Status: DISCONTINUED | OUTPATIENT
Start: 2020-10-22 | End: 2020-10-23 | Stop reason: HOSPADM

## 2020-10-22 RX ORDER — CYANOCOBALAMIN 1000 UG/ML
100 INJECTION, SOLUTION INTRAMUSCULAR; SUBCUTANEOUS
Status: DISCONTINUED | OUTPATIENT
Start: 2020-10-22 | End: 2020-10-23 | Stop reason: HOSPADM

## 2020-10-22 RX ORDER — ONDANSETRON 2 MG/ML
4 INJECTION INTRAMUSCULAR; INTRAVENOUS
Status: DISCONTINUED | OUTPATIENT
Start: 2020-10-22 | End: 2020-10-23 | Stop reason: HOSPADM

## 2020-10-22 RX ORDER — POLYETHYLENE GLYCOL 3350 17 G/17G
17 POWDER, FOR SOLUTION ORAL
Status: DISCONTINUED | OUTPATIENT
Start: 2020-10-22 | End: 2020-10-23 | Stop reason: HOSPADM

## 2020-10-22 RX ORDER — METOPROLOL TARTRATE 25 MG/1
12.5 TABLET, FILM COATED ORAL EVERY 12 HOURS
Status: DISCONTINUED | OUTPATIENT
Start: 2020-10-22 | End: 2020-10-23 | Stop reason: HOSPADM

## 2020-10-22 RX ORDER — CLOPIDOGREL BISULFATE 75 MG/1
75 TABLET ORAL DAILY
Status: DISCONTINUED | OUTPATIENT
Start: 2020-10-22 | End: 2020-10-23 | Stop reason: HOSPADM

## 2020-10-22 RX ORDER — ASPIRIN 325 MG
325 TABLET ORAL ONCE
Status: COMPLETED | OUTPATIENT
Start: 2020-10-22 | End: 2020-10-22

## 2020-10-22 RX ORDER — GUAIFENESIN 100 MG/5ML
81 LIQUID (ML) ORAL DAILY
Status: DISCONTINUED | OUTPATIENT
Start: 2020-10-22 | End: 2020-10-23 | Stop reason: HOSPADM

## 2020-10-22 RX ORDER — BISMUTH SUBSALICYLATE 262 MG
1 TABLET,CHEWABLE ORAL DAILY
Status: DISCONTINUED | OUTPATIENT
Start: 2020-10-22 | End: 2020-10-23 | Stop reason: HOSPADM

## 2020-10-22 RX ORDER — POLYETHYLENE GLYCOL 3350 17 G/17G
17 POWDER, FOR SOLUTION ORAL DAILY PRN
Status: DISCONTINUED | OUTPATIENT
Start: 2020-10-22 | End: 2020-10-23 | Stop reason: HOSPADM

## 2020-10-22 RX ORDER — SODIUM CHLORIDE 0.9 % (FLUSH) 0.9 %
5-40 SYRINGE (ML) INJECTION EVERY 8 HOURS
Status: DISCONTINUED | OUTPATIENT
Start: 2020-10-22 | End: 2020-10-23 | Stop reason: HOSPADM

## 2020-10-22 RX ORDER — ACETAMINOPHEN 650 MG/1
650 SUPPOSITORY RECTAL
Status: DISCONTINUED | OUTPATIENT
Start: 2020-10-22 | End: 2020-10-23 | Stop reason: HOSPADM

## 2020-10-22 RX ORDER — DOCUSATE SODIUM 100 MG/1
100 CAPSULE, LIQUID FILLED ORAL DAILY
Status: DISCONTINUED | OUTPATIENT
Start: 2020-10-22 | End: 2020-10-23 | Stop reason: HOSPADM

## 2020-10-22 RX ORDER — LANOLIN ALCOHOL/MO/W.PET/CERES
1 CREAM (GRAM) TOPICAL 2 TIMES DAILY WITH MEALS
Status: DISCONTINUED | OUTPATIENT
Start: 2020-10-22 | End: 2020-10-23 | Stop reason: HOSPADM

## 2020-10-22 RX ORDER — SODIUM CHLORIDE 0.9 % (FLUSH) 0.9 %
5-40 SYRINGE (ML) INJECTION AS NEEDED
Status: DISCONTINUED | OUTPATIENT
Start: 2020-10-22 | End: 2020-10-23 | Stop reason: HOSPADM

## 2020-10-22 RX ORDER — POTASSIUM CHLORIDE 20 MEQ/1
20 TABLET, EXTENDED RELEASE ORAL
Status: DISPENSED | OUTPATIENT
Start: 2020-10-22 | End: 2020-10-22

## 2020-10-22 RX ORDER — EZETIMIBE 10 MG/1
10 TABLET ORAL DAILY
Status: DISCONTINUED | OUTPATIENT
Start: 2020-10-22 | End: 2020-10-23 | Stop reason: HOSPADM

## 2020-10-22 RX ORDER — INSULIN GLARGINE 100 [IU]/ML
INJECTION, SOLUTION SUBCUTANEOUS DAILY PRN
Status: DISCONTINUED | OUTPATIENT
Start: 2020-10-22 | End: 2020-10-23 | Stop reason: HOSPADM

## 2020-10-22 RX ORDER — PANTOPRAZOLE SODIUM 40 MG/1
40 TABLET, DELAYED RELEASE ORAL
Status: DISCONTINUED | OUTPATIENT
Start: 2020-10-22 | End: 2020-10-23 | Stop reason: HOSPADM

## 2020-10-22 RX ORDER — INSULIN LISPRO 100 [IU]/ML
INJECTION, SOLUTION INTRAVENOUS; SUBCUTANEOUS
Status: DISCONTINUED | OUTPATIENT
Start: 2020-10-22 | End: 2020-10-23 | Stop reason: HOSPADM

## 2020-10-22 RX ORDER — PROMETHAZINE HYDROCHLORIDE 25 MG/1
12.5 TABLET ORAL
Status: DISCONTINUED | OUTPATIENT
Start: 2020-10-22 | End: 2020-10-23 | Stop reason: HOSPADM

## 2020-10-22 RX ORDER — DEXTROSE 50 % IN WATER (D50W) INTRAVENOUS SYRINGE
25-50 AS NEEDED
Status: DISCONTINUED | OUTPATIENT
Start: 2020-10-22 | End: 2020-10-23 | Stop reason: HOSPADM

## 2020-10-22 RX ORDER — ENOXAPARIN SODIUM 100 MG/ML
40 INJECTION SUBCUTANEOUS DAILY
Status: DISCONTINUED | OUTPATIENT
Start: 2020-10-22 | End: 2020-10-23 | Stop reason: HOSPADM

## 2020-10-22 RX ORDER — ALOGLIPTIN 25 MG/1
25 TABLET, FILM COATED ORAL DAILY
Status: DISCONTINUED | OUTPATIENT
Start: 2020-10-22 | End: 2020-10-23 | Stop reason: HOSPADM

## 2020-10-22 RX ORDER — POTASSIUM CHLORIDE 20 MEQ/1
20 TABLET, EXTENDED RELEASE ORAL DAILY
Status: DISCONTINUED | OUTPATIENT
Start: 2020-10-22 | End: 2020-10-23 | Stop reason: HOSPADM

## 2020-10-22 RX ORDER — ATORVASTATIN CALCIUM 20 MG/1
20 TABLET, FILM COATED ORAL
Status: DISCONTINUED | OUTPATIENT
Start: 2020-10-22 | End: 2020-10-23 | Stop reason: HOSPADM

## 2020-10-22 RX ORDER — RANOLAZINE 500 MG/1
1000 TABLET, EXTENDED RELEASE ORAL 2 TIMES DAILY
Status: DISCONTINUED | OUTPATIENT
Start: 2020-10-22 | End: 2020-10-23 | Stop reason: HOSPADM

## 2020-10-22 RX ORDER — MIDODRINE HYDROCHLORIDE 5 MG/1
10 TABLET ORAL
Status: DISCONTINUED | OUTPATIENT
Start: 2020-10-22 | End: 2020-10-23 | Stop reason: HOSPADM

## 2020-10-22 RX ORDER — MELATONIN
1000 DAILY
Status: DISCONTINUED | OUTPATIENT
Start: 2020-10-22 | End: 2020-10-23 | Stop reason: HOSPADM

## 2020-10-22 RX ADMIN — FUROSEMIDE 40 MG: 10 INJECTION, SOLUTION INTRAMUSCULAR; INTRAVENOUS at 12:58

## 2020-10-22 RX ADMIN — POTASSIUM CHLORIDE 20 MEQ: 1500 TABLET, EXTENDED RELEASE ORAL at 13:19

## 2020-10-22 RX ADMIN — EZETIMIBE 10 MG: 10 TABLET ORAL at 13:18

## 2020-10-22 RX ADMIN — MIDODRINE HYDROCHLORIDE 10 MG: 5 TABLET ORAL at 13:18

## 2020-10-22 RX ADMIN — RANOLAZINE 1000 MG: 500 TABLET, FILM COATED, EXTENDED RELEASE ORAL at 22:33

## 2020-10-22 RX ADMIN — PIPERACILLIN AND TAZOBACTAM 3.38 G: 3; .375 INJECTION, POWDER, LYOPHILIZED, FOR SOLUTION INTRAVENOUS at 23:25

## 2020-10-22 RX ADMIN — CYANOCOBALAMIN 100 MCG: 1000 INJECTION, SOLUTION INTRAMUSCULAR; SUBCUTANEOUS at 12:58

## 2020-10-22 RX ADMIN — METOPROLOL TARTRATE 12.5 MG: 25 TABLET, FILM COATED ORAL at 13:19

## 2020-10-22 RX ADMIN — DRONABINOL 5 MG: 2.5 CAPSULE ORAL at 13:18

## 2020-10-22 RX ADMIN — FERROUS SULFATE TAB 325 MG (65 MG ELEMENTAL FE) 325 MG: 325 (65 FE) TAB at 13:19

## 2020-10-22 RX ADMIN — PANTOPRAZOLE SODIUM 40 MG: 40 TABLET, DELAYED RELEASE ORAL at 13:18

## 2020-10-22 RX ADMIN — ASPIRIN 325 MG ORAL TABLET 325 MG: 325 PILL ORAL at 13:19

## 2020-10-22 RX ADMIN — AZITHROMYCIN 500 MG: 500 INJECTION, POWDER, LYOPHILIZED, FOR SOLUTION INTRAVENOUS at 12:55

## 2020-10-22 RX ADMIN — CLOPIDOGREL BISULFATE 75 MG: 75 TABLET ORAL at 13:19

## 2020-10-22 RX ADMIN — ALOGLIPTIN 25 MG: 25 TABLET, FILM COATED ORAL at 13:18

## 2020-10-22 RX ADMIN — Medication 1 TABLET: at 13:17

## 2020-10-22 RX ADMIN — DOCUSATE SODIUM 100 MG: 100 CAPSULE, LIQUID FILLED ORAL at 13:18

## 2020-10-22 RX ADMIN — PIPERACILLIN AND TAZOBACTAM 3.38 G: 3; .375 INJECTION, POWDER, LYOPHILIZED, FOR SOLUTION INTRAVENOUS at 14:53

## 2020-10-22 RX ADMIN — RENO CAPS 1 CAPSULE: 100; 1.5; 1.7; 20; 10; 1; 150; 5; 6 CAPSULE ORAL at 13:18

## 2020-10-22 RX ADMIN — POLYETHYLENE GLYCOL 3350 17 G: 17 POWDER, FOR SOLUTION ORAL at 23:34

## 2020-10-22 RX ADMIN — ATORVASTATIN CALCIUM 20 MG: 20 TABLET, FILM COATED ORAL at 22:32

## 2020-10-22 RX ADMIN — ENOXAPARIN SODIUM 40 MG: 40 INJECTION SUBCUTANEOUS at 12:59

## 2020-10-22 NOTE — H&P
GENERAL GENERIC H&P/CONSULT  Presenting complaint: Chest pain/Shortness of breath    Subjective: 79-year-old male with past medical history of multiple comorbidities presents to [facility] with complaints of chest pain. Patient states he was in his usual state of health until earlier this a.m. when he started sprinting sudden onset intermittent left-sided chest pressure which is moderate severe in intensity, nonradiating with no aggravating or alleviating factors following which patient presented to the ED. Of note patient is complaining of abdominal distention and an inability to pass urine over the past 6 hours. Patient denies any fevers chills nausea vomiting lightheadedness dizziness orthopnea paroxysmal nocturnal dyspnea palpitations headache focal weakness loss of sensation auditory or visual symptoms or stool complaints. Of note patient does present from a nursing home    Past Medical History:   Diagnosis Date    Diabetes ([facility] [address]     Essential hypertension     Hyperlipidemia     Myocardial infarction [facility])     Pacemaker     Pulmonary hypertension ([facility] [address] 10/16/2020      Past Surgical History:   Procedure Laterality Date    HX CORONARY ARTERY BYPASS GRAFT      LA INSJ/RPLCMT PERM DFB W/TRNSVNS LDS 1/DUAL CHMBR N/A 5/17/2019    pm-> ICD upgrade, RV lead, [employer] performed by Adam Wilkins MD at [facility] CATH LAB      Prior to Admission medications    Medication Sig Start Date End Date Taking? Authorizing Provider   alogliptin (NESINA) 25 mg tablet Take 1 Tab by mouth daily. 10/17/20   Jame Jean Baptiste MD   dronabinoL (MARINOL) 5 mg capsule Take 1 Cap by mouth Before breakfast and dinner. Max Daily Amount: 10 mg. 10/16/20   Akash BURT MD   midodrine (PROAMATINE) 10 mg tablet Take 1 Tab by mouth three (3) times daily (with meals) for 30 days. Indications: hypotension 10/16/20 11/15/20  Jame Jean Baptiste MD   multivitamin (ONE A DAY) tablet Take 1 Tab by mouth daily. 10/17/20   Allan Unger MD   atorvastatin (LIPITOR) 20 mg tablet Take 1 Tab by mouth nightly. 10/16/20   Allan Unger MD   metoprolol tartrate (LOPRESSOR) 25 mg tablet Take 0.5 Tabs by mouth every twelve (12) hours. 10/16/20   Allan Unger MD   b complex-vitamin c-folic acid (NEPHROCAPS) 1 mg capsule Take 1 Cap by mouth daily. 10/10/20   Rigo Mohan MD   docusate sodium (COLACE) 100 mg capsule Take 1 Cap by mouth daily for 90 days. 10/10/20 1/8/21  Rigo Mohan MD   polyethylene glycol [facility]) 17 gram packet Take 1 Packet by mouth daily as needed for Constipation. 10/9/20   Rigo Mohan MD   potassium chloride (K-DUR, KLOR-CON) 20 mEq tablet Take 1 Tab by mouth daily. 10/10/20   Rigo Mohan MD   clopidogrel (PLAVIX) 75 mg tab TAKE ONE TABLET BY MOUTH ONE TIME DAILY 4/2/19   Provider, Historical   ezetimibe (ZETIA) 10 mg tablet TAKE ONE TABLET BY MOUTH AT BEDTIME 4/18/19   Provider, Historical   ranolazine ER (RANEXA) 1,000 mg TAKE ONE TABLET BY MOUTH TWICE A DAY 4/18/19   Provider, Historical   levothyroxine (SYNTHROID) 88 mcg tablet TAKE ONE TABLET BY MOUTH EVERY MORNING 3/25/19   Provider, Historical   cyanocobalamin (VITAMIN B12) 1,000 mcg/mL injection INJECT 1 ML SUBCUTANEOUSLY ONCE MONTHLY 3/25/19   Provider, Historical   ferrous sulfate 325 mg (65 mg iron) tablet TAKE ONE TABLET BY MOUTH TWICE A DAY 3/21/19   Provider, Historical   pantoprazole (PROTONIX) 40 mg tablet TAKE ONE TABLET BY MOUTH EVERY MORNING 2/23/19   Provider, Historical   nitroglycerin (NITROSTAT) 0.4 mg SL tablet 0.4 mg by SubLINGual route every five (5) minutes as needed for Chest Pain. Up to 3 doses. Provider, Historical   cholecalciferol (VITAMIN D3) 1,000 unit tablet Take  by mouth daily. Provider, Historical   insulin detemir (LEVEMIR FLEXTOUCH U-100 INSULN SC) by SubCUTAneous route.  Indications: as needed for BS > 150 once a day then take 10 units    Provider, Historical     Allergies   Allergen Reactions    Bactrim [Sulfamethoprim] Other (comments)   Social historypatient lives in nursing home, on no history of smoking, EtOH use, substance abuse  Social History     Tobacco Use    Smoking status: Former Smoker    Smokeless tobacco: Never Used   Substance Use Topics    Alcohol use: Not Currently      Family History   Problem Relation Age of Onset    No Known Problems Mother     No Known Problems Father       Review of Systems   Constitutional: Positive for activity change and fatigue. Negative for appetite change, chills, diaphoresis, fever and unexpected weight change. HENT: Negative for congestion, dental problem, drooling, ear discharge, ear pain, facial swelling, hearing loss, mouth sores, nosebleeds, postnasal drip, rhinorrhea, sinus pressure, sinus pain, sneezing, sore throat, tinnitus, trouble swallowing and voice change. Eyes: Negative for photophobia, pain, discharge, redness, itching and visual disturbance. Respiratory: Positive for chest tightness. Negative for apnea, cough, choking, shortness of breath, wheezing and stridor. Cardiovascular: Positive for chest pain. Negative for palpitations and leg swelling. Gastrointestinal: Positive for abdominal distention and abdominal pain. Negative for anal bleeding, blood in stool, constipation, diarrhea, nausea, rectal pain and vomiting. Endocrine: Negative for cold intolerance, heat intolerance, polydipsia, polyphagia and polyuria. Genitourinary: Positive for decreased urine volume and difficulty urinating. Negative for discharge, dysuria, enuresis, flank pain, frequency, genital sores, hematuria, penile pain, penile swelling, scrotal swelling, testicular pain and urgency. Musculoskeletal: Negative for arthralgias, back pain, gait problem, joint swelling, myalgias, neck pain and neck stiffness. Skin: Negative for color change, pallor, rash and wound.    Allergic/Immunologic: Negative for environmental allergies, food allergies and immunocompromised state. Neurological: Negative for dizziness, tremors, seizures, syncope, facial asymmetry, speech difficulty, weakness, light-headedness, numbness and headaches. Hematological: Negative for adenopathy. Does not bruise/bleed easily. Psychiatric/Behavioral: Negative for agitation, behavioral problems, confusion, decreased concentration, dysphoric mood, hallucinations, self-injury, sleep disturbance and suicidal ideas. The patient is not nervous/anxious and is not hyperactive. Objective:    No intake/output data recorded. No intake/output data recorded. Patient Vitals for the past 8 hrs:   BP Temp Pulse Resp SpO2 Height Weight   10/22/20 0530 111/60  73 20 95 %     10/22/20 0507 (!) 110/51 97.6 °F (36.4 °C) 73 19  5' 6\" (1.676 m) 78 kg (172 lb)     Physical Exam  Vitals signs reviewed. Constitutional:       General: He is not in acute distress. Appearance: He is obese. He is not ill-appearing, toxic-appearing or diaphoretic. HENT:      Head: Normocephalic and atraumatic. Nose: Nose normal. No congestion or rhinorrhea. Mouth/Throat:      Mouth: Mucous membranes are moist.      Pharynx: Oropharynx is clear. No oropharyngeal exudate or posterior oropharyngeal erythema. Eyes:      General: No scleral icterus. Right eye: No discharge. Left eye: No discharge. Extraocular Movements: Extraocular movements intact. Conjunctiva/sclera: Conjunctivae normal.      Pupils: Pupils are equal, round, and reactive to light. Neck:      Musculoskeletal: Normal range of motion and neck supple. No neck rigidity or muscular tenderness. Vascular: No carotid bruit. Cardiovascular:      Rate and Rhythm: Normal rate and regular rhythm. Pulses: Normal pulses. Heart sounds: Normal heart sounds. No murmur. No friction rub. No gallop. Pulmonary:      Effort: Pulmonary effort is normal. No respiratory distress.       Breath sounds: Normal breath sounds. No stridor. No wheezing, rhonchi or rales. Chest:      Chest wall: No tenderness. Abdominal:      General: Abdomen is flat. Bowel sounds are normal. There is distension. Palpations: There is no mass. Tenderness: There is no abdominal tenderness. There is no right CVA tenderness, left CVA tenderness, guarding or rebound. Hernia: No hernia is present. Musculoskeletal: Normal range of motion. General: No swelling, tenderness, deformity or signs of injury. Right lower leg: No edema. Left lower leg: No edema. Lymphadenopathy:      Cervical: No cervical adenopathy. Skin:     General: Skin is warm. Capillary Refill: Capillary refill takes less than 2 seconds. Coloration: Skin is not jaundiced or pale. Findings: No bruising, erythema, lesion or rash. Neurological:      General: No focal deficit present. Mental Status: He is alert and oriented to person, place, and time. Mental status is at baseline. Cranial Nerves: No cranial nerve deficit. Sensory: No sensory deficit. Motor: No weakness. Coordination: Coordination normal.      Gait: Gait normal.      Deep Tendon Reflexes: Reflexes normal.   Psychiatric:         Mood and Affect: Mood normal.         Behavior: Behavior normal.         Thought Content:  Thought content normal.         Judgment: Judgment normal.          Labs:    Recent Results (from the past 24 hour(s))   CBC WITH AUTOMATED DIFF    Collection Time: 10/22/20  5:45 AM   Result Value Ref Range    WBC 11.5 (H) 4.1 - 11.1 K/uL    RBC 3.72 (L) 4.10 - 5.70 M/uL    HGB 10.2 (L) 12.1 - 17.0 g/dL    HCT 31.5 (L) 36.6 - 50.3 %    MCV 84.7 80.0 - 99.0 FL    MCH 27.4 26.0 - 34.0 PG    MCHC 32.4 30.0 - 36.5 g/dL    RDW 18.3 (H) 11.5 - 14.5 %    PLATELET 360 688 - 125 K/uL    MPV 10.2 8.9 - 12.9 FL    NEUTROPHILS 77 (H) 32 - 75 %    LYMPHOCYTES 10 (L) 12 - 49 %    MONOCYTES 10 5 - 13 %    EOSINOPHILS 2 0 - 7 %    BASOPHILS 0 0 - 1 % IMMATURE GRANULOCYTES 1 (H) 0.0 - 0.5 %    ABS. NEUTROPHILS 8.9 (H) 1.8 - 8.0 K/UL    ABS. LYMPHOCYTES 1.1 0.8 - 3.5 K/UL    ABS. MONOCYTES 1.2 (H) 0.0 - 1.0 K/UL    ABS. EOSINOPHILS 0.3 0.0 - 0.4 K/UL    ABS. BASOPHILS 0.0 0.0 - 0.1 K/UL    ABS. IMM. GRANS. 0.1 (H) 0.00 - 0.04 K/UL    DF AUTOMATED     METABOLIC PANEL, COMPREHENSIVE    Collection Time: 10/22/20  5:45 AM   Result Value Ref Range    Sodium 136 136 - 145 mmol/L    Potassium 3.7 3.5 - 5.1 mmol/L    Chloride 100 97 - 108 mmol/L    CO2 27 21 - 32 mmol/L    Anion gap 9 5 - 15 mmol/L    Glucose 123 (H) 65 - 100 mg/dL    BUN 24 (H) 6 - 20 mg/dL    Creatinine 1.02 0.70 - 1.30 mg/dL    BUN/Creatinine ratio 24 (H) 12 - 20      GFR est AA >60 >60 ml/min/1.73m2    GFR est non-AA >60 >60 ml/min/1.73m2    Calcium 9.2 8.5 - 10.1 mg/dL    Bilirubin, total 1.2 (H) 0.2 - 1.0 mg/dL    AST (SGOT) 27 15 - 37 U/L    ALT (SGPT) 20 12 - 78 U/L    Alk. phosphatase 131 (H) 45 - 117 U/L    Protein, total 7.4 6.4 - 8.2 g/dL    Albumin 2.7 (L) 3.5 - 5.0 g/dL    Globulin 4.7 (H) 2.0 - 4.0 g/dL    A-G Ratio 0.6 (L) 1.1 - 2.2     BNP    Collection Time: 10/22/20  5:45 AM   Result Value Ref Range    NT pro-BNP 3,678 (H) <450 pg/mL   TROPONIN I    Collection Time: 10/22/20  5:45 AM   Result Value Ref Range    Troponin-I, Qt. <0.05 <0.05 ng/mL       ECG: unchanged from previous tracings   Chest X-Ray: IMPRESSION  IMPRESSION:     Mild pulmonary edema. Right basilar airspace disease. Follow-up as clinically  indicated.     Assessment:  Active Problems:    Chest pain at rest (10/22/2020)        Plan:    Chest painpatient presents with complaints of chest pain with patient's EKG consistent with a paced rhythm, patient's initial set of cardiac enzymes are normal, patient's chest x-ray is consistent with mild volume overload as well as possible pneumonia  Recommend aspirin 325 mg x 1  Recommend continuation of aspirin and Plavix  Trend cardiac enzymes every 6x3  Transthoracic echo  Cardiology consult    CHF exacerbationpatient presents with above-mentioned symptomatology and was found to have mild pulmonary edema consistent with a CHF exacerbation, patient remains hemodynamically stable at this time  Daily weights  Frequent intake and output monitoring  Lasix 40 mg IV once daily  Continue to monitor respiratory status  Cardiology consult    Hypokalemiareplete potassium and recheck potassium    Pneumoniapatient presents with above-mentioned symptomatology and was found to have right lower lobe airspace disease consistent with pneumonia, of note patient is a resident of a nursing home, patient does not meet sepsis criteria at this time  Obtain blood cultures  Obtain sputum cultures  Obtain COVID-19 testing  Zosyn and azithromycin for antibiotic coverage  Continue to monitor respiratory status    Urinary retentionpatient found to have significant urinary retention upon presentation, unclear as to etiology  Recommend initiation of Tellez insertion  Continue to monitor urine output  Ultrasound KUB  Urology consult    Hyperlipidemiacontinue statin    Diabetesinsulin sliding scale    ProphylaxisLovenox  FENcardiac/diabetic diet, replete potassium and magnesium  Full code, will clarify about surrogate decision-maker, admitted to telemetry for observation    Signed:   Matilde Goodman MD 10/22/2020

## 2020-10-22 NOTE — CONSULTS
Consult    Patient: Eddie Laura MRN: [id number]  SSN: [id number]    YOB: 1940  Age: 79 y.o. Sex: male      Subjective:      Eddie Laura is a 79 y.o. male who is being seen for pain. Mr. Gray Irene is with history of coronary disease status post CABG, status post PCI of left main, anomalous LCx, peripheral vascular disease, hyperlipidemia, status post biventricular AICD who was recently discharged after he ruled out for myocardial infarction. He was to discharge to rehab facility and woke up with some chest pain. Left-sided, nonradiating, sharp in nature. It is nonexertional.  Did not have any nausea, vomiting or excessive sweating. Anyhow he was complaining that he was unable to to void.   Past Medical History:   Diagnosis Date    Diabetes ([facility] [address]     Essential hypertension     Hyperlipidemia     Myocardial infarction [facility])     Pacemaker     Pulmonary hypertension ([facility] [address] 10/16/2020     Past Surgical History:   Procedure Laterality Date    HX CORONARY ARTERY BYPASS GRAFT      MI INSJ/RPLCMT PERM DFB W/TRNSVNS LDS 1/DUAL CHMBR N/A 5/17/2019    pm-> ICD upgrade, RV lead, [employer] performed by Howard Kwon MD at [facility] CATH LAB      Family History   Problem Relation Age of Onset    No Known Problems Mother     No Known Problems Father      Social History     Tobacco Use    Smoking status: Former Smoker    Smokeless tobacco: Never Used   Substance Use Topics    Alcohol use: Not Currently      Current Facility-Administered Medications   Medication Dose Route Frequency Provider Last Rate Last Dose    potassium chloride (K-DUR, KLOR-CON) SR tablet 20 mEq  20 mEq Oral NOW Osmany Nascimento MD        alogliptin (NESINA) tablet 25 mg  25 mg Oral DAILY Osmany Nascimento MD   25 mg at 10/22/20 1318    atorvastatin (LIPITOR) tablet 20 mg  20 mg Oral QHS Kehinde Penaloza MD        B complex-vitaminC-folic acid (NEPHROCAP) cap  1 Cap Oral Renae Desai MD   1 Cap at 10/22/20 1318    cholecalciferol (VITAMIN D3) (1000 Units /25 mcg) tablet 1 Tab  1,000 Units Oral DAILY Caryn Portillo MD   1 Tab at 10/22/20 1317    clopidogreL (PLAVIX) tablet 75 mg  75 mg Oral DAILY Caryn Portillo MD   75 mg at 10/22/20 1319    cyanocobalamin (VITAMIN B12) injection 100 mcg  100 mcg SubCUTAneous Q30D Caryn Portillo MD   100 mcg at 10/22/20 1258    docusate sodium (COLACE) capsule 100 mg  100 mg Oral DAILY Caryn Portillo MD   100 mg at 10/22/20 1318    dronabinoL (MARINOL) capsule 5 mg  5 mg Oral ACB&D Caryn Portillo MD   5 mg at 10/22/20 1318    ezetimibe (ZETIA) tablet 10 mg  10 mg Oral DAILY Lashonda Nascimento MD   10 mg at 10/22/20 1318    ferrous sulfate tablet 325 mg  1 Tab Oral BID WITH MEALS Osmany Nascimento MD   325 mg at 10/22/20 1319    insulin glargine (LANTUS) injection   SubCUTAneous DAILY PRN Caryn Portillo MD        metoprolol tartrate (LOPRESSOR) tablet 12.5 mg  12.5 mg Oral Q12H Caryn Portillo MD   12.5 mg at 10/22/20 1319    midodrine (PROAMATINE) tablet 10 mg  10 mg Oral TID WITH MEALS Osmany Nascimento MD   10 mg at 10/22/20 1318    multivitamin (ONE A DAY) tablet 1 Tab  1 Tab Oral DAILY Caryn Portillo MD        nitroglycerin (NITROSTAT) tablet 0.4 mg  0.4 mg SubLINGual Q5MIN PRN Caryn Portillo MD        pantoprazole (PROTONIX) tablet 40 mg  40 mg Oral ACB Caryn Portillo MD   40 mg at 10/22/20 1318    polyethylene glycol (MIRALAX) packet 17 g  17 g Oral DAILY PRN Caryn Portillo MD        potassium chloride (K-DUR, KLOR-CON) SR tablet 20 mEq  20 mEq Oral DAILY Lashonda Nascimento MD   20 mEq at 10/22/20 1319    ranolazine ER (RANEXA) tablet 1,000 mg  1,000 mg Oral BID Caryn Portillo MD        glucose chewable tablet 16 g  4 Tab Oral PRN Tu Watts MD        dextrose (D50W) injection syrg 12.5-25 g  25-50 mL IntraVENous PRN Tu Watts MD        glucagon [facility]) injection 1 mg  1 mg IntraMUSCular PRN Tu Watts MD        insulin lispro (HUMALOG) injection   SubCUTAneous AC&HS Tu Watts MD        piperacillin-tazobactam (ZOSYN) 3.375 g in 0.9% sodium chloride (MBP/ADV) 100 mL MBP  3.375 g IntraVENous Q6H Osmany Nascimento MD        azithromycin (ZITHROMAX) 500 mg in 0.9% sodium chloride 250 mL (VIAL-MATE)  500 mg IntraVENous Q24H uT Watts MD   500 mg at 10/22/20 1255    furosemide (LASIX) injection 40 mg  40 mg IntraVENous DAILY Tu Watts MD   40 mg at 10/22/20 1258    sodium chloride (NS) flush 5-40 mL  5-40 mL IntraVENous Osmany Curiel MD        sodium chloride (NS) flush 5-40 mL  5-40 mL IntraVENous GEORGI Watts MD        acetaminophen (TYLENOL) tablet 650 mg  650 mg Oral Q6H PRN Tu Watts MD        Or    acetaminophen (TYLENOL) suppository 650 mg  650 mg Rectal Q6H PRN Tu Watts MD        polyethylene glycol [facility]) packet 17 g  17 g Oral DAILY PRN Tu Watts MD        promethazine (PHENERGAN) tablet 12.5 mg  12.5 mg Oral Q6H PRWILLY Watts MD        Or    ondansetron [facility]) injection 4 mg  4 mg IntraVENous Q6H PRWILLY Watts MD        enoxaparin (LOVENOX) injection 40 mg  40 mg SubCUTAneous DAILY Godwin Nascimento MD   40 mg at 10/22/20 1259    aspirin chewable tablet 81 mg  81 mg Oral DAILY Godwin Nascimento MD         Current Outpatient Medications   Medication Sig Dispense Refill    alogliptin (NESINA) 25 mg tablet Take 1 Tab by mouth daily. 30 Tab 0    dronabinoL (MARINOL) 5 mg capsule Take 1 Cap by mouth Before breakfast and dinner.  Max Daily Amount: 10 mg. 60 Cap 0    midodrine (PROAMATINE) 10 mg tablet Take 1 Tab by mouth three (3) times daily (with meals) for 30 days. Indications: hypotension 90 Tab 0    multivitamin (ONE A DAY) tablet Take 1 Tab by mouth daily. 30 Tab 0    atorvastatin (LIPITOR) 20 mg tablet Take 1 Tab by mouth nightly. 30 Tab 0    metoprolol tartrate (LOPRESSOR) 25 mg tablet Take 0.5 Tabs by mouth every twelve (12) hours. 60 Tab 0    b complex-vitamin c-folic acid (NEPHROCAPS) 1 mg capsule Take 1 Cap by mouth daily. 30 Cap 0    docusate sodium (COLACE) 100 mg capsule Take 1 Cap by mouth daily for 90 days. 30 Cap 2    polyethylene glycol (MIRALAX) 17 gram packet Take 1 Packet by mouth daily as needed for Constipation. 30 Packet 0    potassium chloride (K-DUR, KLOR-CON) 20 mEq tablet Take 1 Tab by mouth daily. 30 Tab 0    clopidogrel (PLAVIX) 75 mg tab TAKE ONE TABLET BY MOUTH ONE TIME DAILY  3    ezetimibe (ZETIA) 10 mg tablet TAKE ONE TABLET BY MOUTH AT BEDTIME  1    ranolazine ER (RANEXA) 1,000 mg TAKE ONE TABLET BY MOUTH TWICE A DAY  4    levothyroxine (SYNTHROID) 88 mcg tablet TAKE ONE TABLET BY MOUTH EVERY MORNING  1    cyanocobalamin (VITAMIN B12) 1,000 mcg/mL injection INJECT 1 ML SUBCUTANEOUSLY ONCE MONTHLY  5    ferrous sulfate 325 mg (65 mg iron) tablet TAKE ONE TABLET BY MOUTH TWICE A DAY  1    pantoprazole (PROTONIX) 40 mg tablet TAKE ONE TABLET BY MOUTH EVERY MORNING  1    nitroglycerin (NITROSTAT) 0.4 mg SL tablet 0.4 mg by SubLINGual route every five (5) minutes as needed for Chest Pain. Up to 3 doses.  cholecalciferol (VITAMIN D3) 1,000 unit tablet Take  by mouth daily.  insulin detemir (LEVEMIR FLEXTOUCH U-100 INSULN SC) by SubCUTAneous route.  Indications: as needed for BS > 150 once a day then take 10 units          Allergies   Allergen Reactions    Bactrim [Sulfamethoprim] Other (comments)       Review of Systems:  A comprehensive review of systems was negative except for that written in the History of Present Illness. Objective:     Vitals:    10/22/20 0800 10/22/20 0900 10/22/20 1000 10/22/20 1332   BP: (!) 111/49 (!) 119/54 (!) 118/53 (!) 118/53   Pulse: 72 72 73    Resp: 19 17 17    Temp:       SpO2: 95% 97% 97%    Weight:    78 kg (171 lb 15.3 oz)   Height:    5' 5.98\" (1.676 m)        Physical Exam:  General:  Alert, cooperative, no distress, appears stated age. Eyes:  Conjunctivae/corneas clear. PERRL, EOMs intact. Fundi benign   Ears:  Normal TMs and external ear canals both ears. Nose: Nares normal. Septum midline. Mucosa normal. No drainage or sinus tenderness. Mouth/Throat: Lips, mucosa, and tongue normal. Teeth and gums normal.   Neck: Supple, symmetrical, trachea midline, no adenopathy, thyroid: no enlargment/tenderness/nodules, no carotid bruit and no JVD. Back:   Symmetric, no curvature. ROM normal. No CVA tenderness. Lungs:   Clear to auscultation bilaterally. Heart:  Regular rate and rhythm, S1, S2 normal, no murmur, click, rub or gallop. Abdomen:   Soft, non-tender. Bowel sounds normal. No masses,  No organomegaly. Extremities: Extremities normal, atraumatic, no cyanosis or edema. Pulses: 2+ and symmetric all extremities. Skin: Skin color, texture, turgor normal. No rashes or lesions   Lymph nodes: Cervical, supraclavicular, and axillary nodes normal.   Neurologic: CNII-XII intact. Normal strength, sensation and reflexes throughout.        Assessment:     Hospital Problems  Date Reviewed: 10/16/2020          Codes Class Noted POA    Chest pain at rest ICD-10-CM: R07.9  ICD-9-CM: 786.50  10/22/2020 Unknown          Mr. Mariama Sandoval is an 79-year-old -American male with:   1.  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  Arnicatronic September 2017   2.  Chest pain atypical for angina   3.  Coronary artery disease (previous MI and status post coronary CABG)   4.  Hypertension with hypertensive heart disease   5.  Mixed Dyslipidemia      6.  Peripheral vessel disease   7.  Type 2 diabetes mellitus   8.  Former tobacco use   9.  History of thyroid disease   10.  Moderate pulmonary hypertension, RVSP 57 mmHg   11.  History of bleeding per rectum and moderately severe diverticulosis of the descending colon and sigmoid colon. Plan:     Patient seen and examined in the emergency room. Denied having any current chest pain. His chest pain is atypical for angina. WBC count was 11.5, hemoglobin 10, platelet 752, sodium 136, potassium 3.7, creatinine 1.02 and BUN of 24. BNP is elevated but he appears to be euvolemic. There is no lower extremity swelling. Troponin is okay. Showed mild pulmonary edema. Abdominal ultrasound was nonacute. It appears that his main complaint is lower extremity weakness and numbness and tingling. He was unable to participate with so much rehab while he was in rehab place. We will trend his cardiac markers. His blood pressure appears to be okay but hovering on the lower side. Last admission has to be started on midodrine. He received 1 IV dose Lasix. Will monitor kidney function and daily weight and ins and outs. We will continue to follow along with the team    Thank you Dr. Ti wayne.   Signed By: Kristie Arias MD     October 22, 2020

## 2020-10-22 NOTE — ED PROVIDER NOTES
EMERGENCY DEPARTMENT HISTORY AND PHYSICAL EXAM      Date: 10/22/2020  Patient Name: Neville Velasco    History of Presenting Illness     Chief Complaint   Patient presents with    Chest Pain       History Provided By: Patient    HPI: Neville Velasco, 79 y.o. male with a past medical history significant diabetes, hypertension, hyperlipidemia, obesity and myocardial infarction presents to the ED with cc of left-sided chest pain that started approximately 20 minutes before being sent to the emergency department from the local rehab facility. Of breath or syncope relates that he had a remote heart attack and has had a pacemaker installed within the last year or so. Patient is in the rehab facility in order to improve his ambulation. There are no other complaints, changes, or physical findings at this time. PCP: Maia Griggs MD    Current Outpatient Medications   Medication Sig Dispense Refill    alogliptin (NESINA) 25 mg tablet Take 1 Tab by mouth daily. 30 Tab 0    dronabinoL (MARINOL) 5 mg capsule Take 1 Cap by mouth Before breakfast and dinner. Max Daily Amount: 10 mg. 60 Cap 0    midodrine (PROAMATINE) 10 mg tablet Take 1 Tab by mouth three (3) times daily (with meals) for 30 days. Indications: hypotension 90 Tab 0    multivitamin (ONE A DAY) tablet Take 1 Tab by mouth daily. 30 Tab 0    atorvastatin (LIPITOR) 20 mg tablet Take 1 Tab by mouth nightly. 30 Tab 0    metoprolol tartrate (LOPRESSOR) 25 mg tablet Take 0.5 Tabs by mouth every twelve (12) hours. 60 Tab 0    b complex-vitamin c-folic acid (NEPHROCAPS) 1 mg capsule Take 1 Cap by mouth daily. 30 Cap 0    docusate sodium (COLACE) 100 mg capsule Take 1 Cap by mouth daily for 90 days. 30 Cap 2    polyethylene glycol (MIRALAX) 17 gram packet Take 1 Packet by mouth daily as needed for Constipation. 30 Packet 0    potassium chloride (K-DUR, KLOR-CON) 20 mEq tablet Take 1 Tab by mouth daily.  30 Tab 0    clopidogrel (PLAVIX) 75 mg tab TAKE ONE TABLET BY MOUTH ONE TIME DAILY  3    JANUVIA 100 mg tablet TAKE ONE TABLET BY MOUTH ONE TIME DAILY  0    ezetimibe (ZETIA) 10 mg tablet TAKE ONE TABLET BY MOUTH AT BEDTIME  1    ranolazine ER (RANEXA) 1,000 mg TAKE ONE TABLET BY MOUTH TWICE A DAY  4    levothyroxine (SYNTHROID) 88 mcg tablet TAKE ONE TABLET BY MOUTH EVERY MORNING  1    cyanocobalamin (VITAMIN B12) 1,000 mcg/mL injection INJECT 1 ML SUBCUTANEOUSLY ONCE MONTHLY  5    ferrous sulfate 325 mg (65 mg iron) tablet TAKE ONE TABLET BY MOUTH TWICE A DAY  1    furosemide (LASIX) 40 mg tablet TAKE ONE TABLET BY MOUTH ONE TIME DAILY  1    pantoprazole (PROTONIX) 40 mg tablet TAKE ONE TABLET BY MOUTH EVERY MORNING  1    nitroglycerin (NITROSTAT) 0.4 mg SL tablet 0.4 mg by SubLINGual route every five (5) minutes as needed for Chest Pain. Up to 3 doses.  cholecalciferol (VITAMIN D3) 1,000 unit tablet Take  by mouth daily.  insulin detemir (LEVEMIR FLEXTOUCH U-100 INSULN SC) by SubCUTAneous route. Indications: as needed for BS > 150 once a day then take 10 units         Past History     Past Medical History:  Past Medical History:   Diagnosis Date    Diabetes ([facility]     Essential hypertension     Hyperlipidemia     Myocardial infarction [facility])     Pacemaker     Pulmonary hypertension ([facility] 10/16/2020       Past Surgical History:  Past Surgical History:   Procedure Laterality Date    HX CORONARY ARTERY BYPASS GRAFT      FL INSJ/RPLCMT PERM DFB W/TRNSVNS LDS 1/DUAL CHMBR N/A 5/17/2019    pm-> ICD upgrade, RV lead, [employer] performed by Ayad Weinstein MD at [address] [place] [address] LAB       Family History:  Family History   Problem Relation Age of Onset    No Known Problems Mother     No Known Problems Father        Social History:  Social History     Tobacco Use    Smoking status: Former Smoker    Smokeless tobacco: Never Used   Substance Use Topics    Alcohol use: Not Currently    Drug use: Never       Allergies:   Allergies Allergen Reactions    Bactrim [Sulfamethoprim] Other (comments)         Review of Systems     Review of Systems   Constitutional: Negative for activity change, chills and fever. HENT: Negative for ear pain, nosebleeds, rhinorrhea and sore throat. Eyes: Negative for pain and visual disturbance. Respiratory: Positive for shortness of breath. Negative for chest tightness and wheezing. Cardiovascular: Positive for chest pain. Negative for palpitations. Gastrointestinal: Negative for abdominal pain, diarrhea, nausea and vomiting. Endocrine: Negative for heat intolerance, polydipsia and polyuria. Genitourinary: Negative for dysuria, frequency and hematuria. Musculoskeletal: Negative for back pain, joint swelling and neck pain. Skin: Negative for rash. Allergic/Immunologic: Negative. Neurological: Negative for dizziness, seizures, syncope, weakness and headaches. Hematological: Negative for adenopathy. Psychiatric/Behavioral: Negative for behavioral problems and suicidal ideas. The patient is not nervous/anxious. Physical Exam     Physical Exam  Vitals signs and nursing note reviewed. Constitutional:       Appearance: Normal appearance. HENT:      Head: Normocephalic and atraumatic. Nose: Nose normal.      Mouth/Throat:      Mouth: Mucous membranes are moist.   Eyes:      Extraocular Movements: Extraocular movements intact. Conjunctiva/sclera: Conjunctivae normal.      Pupils: Pupils are equal, round, and reactive to light. Neck:      Musculoskeletal: Normal range of motion and neck supple. Cardiovascular:      Rate and Rhythm: Normal rate. Pulses: Normal pulses. Pulmonary:      Effort: Pulmonary effort is normal.      Breath sounds: Normal breath sounds. Abdominal:      General: Abdomen is flat. Bowel sounds are normal.      Palpations: Abdomen is soft. There is fluid wave. Musculoskeletal: Normal range of motion.          General: No swelling, tenderness or deformity. Skin:     General: Skin is warm and dry. Neurological:      General: No focal deficit present. Mental Status: He is alert and oriented to person, place, and time. Psychiatric:         Mood and Affect: Mood normal.         Behavior: Behavior normal.         Diagnostic Study Results     Labs -     Recent Results (from the past 12 hour(s))   CBC WITH AUTOMATED DIFF    Collection Time: 10/22/20  5:45 AM   Result Value Ref Range    WBC 11.5 (H) 4.1 - 11.1 K/uL    RBC 3.72 (L) 4.10 - 5.70 M/uL    HGB 10.2 (L) 12.1 - 17.0 g/dL    HCT 31.5 (L) 36.6 - 50.3 %    MCV 84.7 80.0 - 99.0 FL    MCH 27.4 26.0 - 34.0 PG    MCHC 32.4 30.0 - 36.5 g/dL    RDW 18.3 (H) 11.5 - 14.5 %    PLATELET 995 867 - 720 K/uL    MPV 10.2 8.9 - 12.9 FL    NEUTROPHILS 77 (H) 32 - 75 %    LYMPHOCYTES 10 (L) 12 - 49 %    MONOCYTES 10 5 - 13 %    EOSINOPHILS 2 0 - 7 %    BASOPHILS 0 0 - 1 %    IMMATURE GRANULOCYTES 1 (H) 0.0 - 0.5 %    ABS. NEUTROPHILS 8.9 (H) 1.8 - 8.0 K/UL    ABS. LYMPHOCYTES 1.1 0.8 - 3.5 K/UL    ABS. MONOCYTES 1.2 (H) 0.0 - 1.0 K/UL    ABS. EOSINOPHILS 0.3 0.0 - 0.4 K/UL    ABS. BASOPHILS 0.0 0.0 - 0.1 K/UL    ABS. IMM. GRANS. 0.1 (H) 0.00 - 0.04 K/UL    DF AUTOMATED     METABOLIC PANEL, COMPREHENSIVE    Collection Time: 10/22/20  5:45 AM   Result Value Ref Range    Sodium 136 136 - 145 mmol/L    Potassium 3.7 3.5 - 5.1 mmol/L    Chloride 100 97 - 108 mmol/L    CO2 27 21 - 32 mmol/L    Anion gap 9 5 - 15 mmol/L    Glucose 123 (H) 65 - 100 mg/dL    BUN 24 (H) 6 - 20 mg/dL    Creatinine 1.02 0.70 - 1.30 mg/dL    BUN/Creatinine ratio 24 (H) 12 - 20      GFR est AA >60 >60 ml/min/1.73m2    GFR est non-AA >60 >60 ml/min/1.73m2    Calcium 9.2 8.5 - 10.1 mg/dL    Bilirubin, total 1.2 (H) 0.2 - 1.0 mg/dL    AST (SGOT) 27 15 - 37 U/L    ALT (SGPT) 20 12 - 78 U/L    Alk.  phosphatase 131 (H) 45 - 117 U/L    Protein, total 7.4 6.4 - 8.2 g/dL    Albumin 2.7 (L) 3.5 - 5.0 g/dL    Globulin 4.7 (H) 2.0 - 4.0 g/dL    A-G Ratio 0.6 (L) 1.1 - 2.2     BNP    Collection Time: 10/22/20  5:45 AM   Result Value Ref Range    NT pro-BNP 3,678 (H) <450 pg/mL   TROPONIN I    Collection Time: 10/22/20  5:45 AM   Result Value Ref Range    Troponin-I, Qt. <0.05 <0.05 ng/mL       Radiologic Studies -   XR Results (most recent):  Results from Hospital Encounter encounter on 10/22/20   XR CHEST SNGL V    Narrative Chest one view    COMPARISON: 10/11/2020    FINDINGS:    Cardiac silhouette is enlarged. Left-sided pacemaker. Sternotomy wires. Mild  pulmonary edema. Right basilar air space disease may represent atelectasis,  pneumonia or both. Tiny pleural effusions. No pneumothorax. Impression IMPRESSION:    Mild pulmonary edema. Right basilar airspace disease. Follow-up as clinically  indicated. CT Results  (Last 48 hours)    None        CXR Results  (Last 48 hours)               10/22/20 0543  XR CHEST SNGL V Final result    Impression:  IMPRESSION:       Mild pulmonary edema. Right basilar airspace disease. Follow-up as clinically   indicated. Narrative:  Chest one view       COMPARISON: 10/11/2020       FINDINGS:       Cardiac silhouette is enlarged. Left-sided pacemaker. Sternotomy wires. Mild   pulmonary edema. Right basilar air space disease may represent atelectasis,   pneumonia or both. Tiny pleural effusions. No pneumothorax. Medical Decision Making and ED Course   I am the first provider for this patient. Provider Notes:   DDx includes STEMI, NSTEMI, Angina, PE, Aortic Pathology, Chest Wall Pain, Pleurisy, Pneumonia, GERD/esophagitis, Anxiety. No cough/fever or focal lung findings to suggest pneumonia. No tachycardia, hypoxia or pleuritic component to suggest PE. Pulses symmetric and no extremely elevated BP/asymmetry or classic tearing sensation to suggest Aortic Dissection. Also, no neuro findings. No wretching/forceful vomiting to suggest esophageal disaster.   Denies IV drug abuse, has native valves, no fevers/murmurs or skin lesions to suggest endocarditis. Will evaluate with EKG, labs, cardiac enzymes, chest x-ray. Will provide pain control and reassess. I reviewed the vital signs, available nursing notes, past medical history, past surgical history, family history and social history. Vital Signs-Reviewed the patient's vital signs. Patient Vitals for the past 12 hrs:   Temp Pulse Resp BP SpO2   10/22/20 0530  73 20 111/60 95 %   10/22/20 0507 97.6 °F (36.4 °C) 73 19 (!) 110/51      ED EKG interpretation:  Rhythm: normal paced rhythm; and regular . Rate (approx.): 75; Axis: normal; P wave: normal; QRS interval: normal ; ST/T wave: normal; Other findings: normal. This EKG was interpreted by Sherrell Pena MD    Records Reviewed: Nursing Notes, Old Medical Records and Ambulance Run Sheet    ED Course:   Initial assessment performed. The patients presenting problems have been discussed, and they are in agreement with the care plan formulated and outlined with them. I have encouraged them to ask questions as they arise throughout their visit. Disposition     Admit for observation      Diagnosis     Clinical Impression:   Encounter Diagnoses     ICD-10-CM ICD-9-CM   1. Acute chest pain  R07.9 786.50   2. Acute on chronic congestive heart failure, unspecified heart failure type ([facility]  I50.9 428.0        Attestations:    Jena Haro MD    Please note that this dictation was completed with [employer], the computer voice recognition software. Quite often unanticipated grammatical, syntax, homophones, and other interpretive errors are inadvertently transcribed by the computer software. Please disregard these errors. Please excuse any errors that have escaped final proofreading. Thank you.

## 2020-10-22 NOTE — ED TRIAGE NOTES
Pt and [facility] and rehab, sent for chest pain which started this morning around 3am. Pt on oxycodone for uti requested medicine 1 hour earlier. Nurse reported she was unable to pull it due to time, pt stated he wanted to go to emergency room to get checked out for chest pain.

## 2020-10-22 NOTE — ACP (ADVANCE CARE PLANNING)
10/22/20. CM spoke with pt. His healthcare decision makers are his daughters Royal Barakat ( [phone]) & Jeremías Menon( [phone]). Preference is for CPR/Resuscitaion if his heart were to stop & NO Ventilator Support if health worsened from Covid 19 - recovery likely/not. Pt stated he has informed family that he does not want to be placed on a ventilator.

## 2020-10-23 VITALS
BODY MASS INDEX: 29.69 KG/M2 | HEART RATE: 74 BPM | OXYGEN SATURATION: 97 % | TEMPERATURE: 97.4 F | RESPIRATION RATE: 18 BRPM | WEIGHT: 184.75 LBS | HEIGHT: 66 IN | DIASTOLIC BLOOD PRESSURE: 50 MMHG | SYSTOLIC BLOOD PRESSURE: 97 MMHG

## 2020-10-23 LAB
ANION GAP SERPL CALC-SCNC: 8 MMOL/L (ref 5–15)
ATRIAL RATE: 76 BPM
BUN SERPL-MCNC: 28 MG/DL (ref 6–20)
BUN/CREAT SERPL: 26 (ref 12–20)
CA-I BLD-MCNC: 9 MG/DL (ref 8.5–10.1)
CALCULATED R AXIS, ECG10: -75 DEGREES
CALCULATED T AXIS, ECG11: 74 DEGREES
CHLORIDE SERPL-SCNC: 103 MMOL/L (ref 97–108)
CO2 SERPL-SCNC: 26 MMOL/L (ref 21–32)
CREAT SERPL-MCNC: 1.09 MG/DL (ref 0.7–1.3)
DIAGNOSIS, 93000: NORMAL
ECHO AR MAX VEL PISA: 414 CM/S
ECHO AV PEAK GRADIENT: 5 MMHG
ECHO AV REGURGITANT PHT: 519 MS
ECHO EST RA PRESSURE: 15 MMHG
ECHO LV E' SEPTAL VELOCITY: 5.17 CM/S
ECHO LV EDV A2C: 98.6 CM3
ECHO LV EDV A4C: 125 CM3
ECHO LV EJECTION FRACTION A2C: 15 %
ECHO LV EJECTION FRACTION BIPLANE: 38.4 % (ref 55–100)
ECHO LV ESV A2C: 60.7 CM3
ECHO LV ESV A4C: 84 CM3
ECHO LV INTERNAL DIMENSION DIASTOLIC: 4.62 CM (ref 4.2–5.9)
ECHO LV INTERNAL DIMENSION SYSTOLIC: 3.93 CM
ECHO LV IVSD: 1.72 CM (ref 0.6–1)
ECHO LV MASS 2D: 355.8 G (ref 88–224)
ECHO LV MASS INDEX 2D: 189.7 G/M2 (ref 49–115)
ECHO LV POSTERIOR WALL DIASTOLIC: 1.73 CM (ref 0.6–1)
ECHO LVOT PEAK GRADIENT: 3 MMHG
ECHO MV A VELOCITY: 46.3 CM/S
ECHO MV E DECELERATION TIME (DT): 169 MS
ECHO MV E VELOCITY: 124 CM/S
ECHO MV E/A RATIO: 2.68
ECHO MV E/E' SEPTAL: 23.98
ECHO MV REGURGITANT VTIA: 177 CM
ECHO PV PEAK INSTANTANEOUS GRADIENT SYSTOLIC: 13 MMHG
ECHO PV PEAK INSTANTANEOUS GRADIENT SYSTOLIC: 2 MMHG
ECHO PV REGURGITANT MAX VELOCITY: 108 CM/S
ECHO PV REGURGITANT MAX VELOCITY: 179 CM/S
ECHO PV REGURGITANT MAX VELOCITY: 507 CM/S
ECHO PV REGURGITANT MAX VELOCITY: 65.2 CM/S
ECHO PV REGURGITANT MAX VELOCITY: 92.5 CM/S
ECHO PVEIN A DURATION: 106 MS
ECHO PVEIN A VELOCITY: 28.1 CM/S
ECHO RIGHT VENTRICULAR SYSTOLIC PRESSURE (RVSP): 86 MMHG
ECHO RV INTERNAL DIMENSION: 4.24 CM
ECHO TV MAX VELOCITY: 421 CM/S
ECHO TV MEAN GRADIENT: 64 MMHG
ECHO TV REGURGITANT PEAK GRADIENT: 71 MMHG
ERYTHROCYTE [DISTWIDTH] IN BLOOD BY AUTOMATED COUNT: 18.1 % (ref 11.5–14.5)
GLUCOSE BLD STRIP.AUTO-MCNC: 142 MG/DL (ref 65–100)
GLUCOSE BLD STRIP.AUTO-MCNC: 158 MG/DL (ref 65–100)
GLUCOSE SERPL-MCNC: 151 MG/DL (ref 65–100)
HCT VFR BLD AUTO: 28.3 % (ref 36.6–50.3)
HGB BLD-MCNC: 9.1 G/DL (ref 12.1–17)
MCH RBC QN AUTO: 27 PG (ref 26–34)
MCHC RBC AUTO-ENTMCNC: 32.2 G/DL (ref 30–36.5)
MCV RBC AUTO: 84 FL (ref 80–99)
PERFORMED BY, TECHID: ABNORMAL
PERFORMED BY, TECHID: ABNORMAL
PLATELET # BLD AUTO: 299 K/UL (ref 150–400)
PMV BLD AUTO: 10.4 FL (ref 8.9–12.9)
POTASSIUM SERPL-SCNC: 3.7 MMOL/L (ref 3.5–5.1)
Q-T INTERVAL, ECG07: 488 MS
QRS DURATION, ECG06: 174 MS
QTC CALCULATION (BEZET), ECG08: 544 MS
RBC # BLD AUTO: 3.37 M/UL (ref 4.1–5.7)
SODIUM SERPL-SCNC: 137 MMOL/L (ref 136–145)
VENTRICULAR RATE, ECG03: 75 BPM
WBC # BLD AUTO: 8.3 K/UL (ref 4.1–11.1)

## 2020-10-23 PROCEDURE — 36415 COLL VENOUS BLD VENIPUNCTURE: CPT

## 2020-10-23 PROCEDURE — 82962 GLUCOSE BLOOD TEST: CPT

## 2020-10-23 PROCEDURE — 96372 THER/PROPH/DIAG INJ SC/IM: CPT

## 2020-10-23 PROCEDURE — 74011636637 HC RX REV CODE- 636/637: Performed by: INTERNAL MEDICINE

## 2020-10-23 PROCEDURE — 74011250637 HC RX REV CODE- 250/637: Performed by: INTERNAL MEDICINE

## 2020-10-23 PROCEDURE — 96376 TX/PRO/DX INJ SAME DRUG ADON: CPT

## 2020-10-23 PROCEDURE — 90471 IMMUNIZATION ADMIN: CPT

## 2020-10-23 PROCEDURE — 80048 BASIC METABOLIC PNL TOTAL CA: CPT

## 2020-10-23 PROCEDURE — 97161 PT EVAL LOW COMPLEX 20 MIN: CPT

## 2020-10-23 PROCEDURE — 97535 SELF CARE MNGMENT TRAINING: CPT

## 2020-10-23 PROCEDURE — 97166 OT EVAL MOD COMPLEX 45 MIN: CPT

## 2020-10-23 PROCEDURE — 85027 COMPLETE CBC AUTOMATED: CPT

## 2020-10-23 PROCEDURE — 99218 HC RM OBSERVATION: CPT

## 2020-10-23 PROCEDURE — 74011000258 HC RX REV CODE- 258: Performed by: INTERNAL MEDICINE

## 2020-10-23 PROCEDURE — 74011250636 HC RX REV CODE- 250/636: Performed by: INTERNAL MEDICINE

## 2020-10-23 PROCEDURE — 97530 THERAPEUTIC ACTIVITIES: CPT

## 2020-10-23 PROCEDURE — 90674 CCIIV4 VAC NO PRSV 0.5 ML IM: CPT | Performed by: INTERNAL MEDICINE

## 2020-10-23 RX ORDER — SORBITOL SOLUTION 70 %
30 SOLUTION, ORAL MISCELLANEOUS ONCE
Status: COMPLETED | OUTPATIENT
Start: 2020-10-23 | End: 2020-10-23

## 2020-10-23 RX ORDER — SORBITOL SOLUTION 70 %
30 SOLUTION, ORAL MISCELLANEOUS DAILY PRN
Status: DISCONTINUED | OUTPATIENT
Start: 2020-10-23 | End: 2020-10-23

## 2020-10-23 RX ORDER — GABAPENTIN 100 MG/1
100 CAPSULE ORAL 3 TIMES DAILY
Qty: 90 CAP | Refills: 0 | Status: SHIPPED | OUTPATIENT
Start: 2020-10-23

## 2020-10-23 RX ORDER — FUROSEMIDE 40 MG/1
TABLET ORAL
Qty: 30 TAB | Refills: 0 | Status: SHIPPED | OUTPATIENT
Start: 2020-10-23

## 2020-10-23 RX ORDER — LEVOFLOXACIN 750 MG/1
750 TABLET ORAL DAILY
Qty: 5 TAB | Refills: 0 | Status: SHIPPED | OUTPATIENT
Start: 2020-10-23

## 2020-10-23 RX ORDER — SITAGLIPTIN 100 MG/1
TABLET, FILM COATED ORAL
Qty: 30 TAB | Refills: 0 | Status: SHIPPED | OUTPATIENT
Start: 2020-10-23

## 2020-10-23 RX ORDER — GUAIFENESIN 100 MG/5ML
81 LIQUID (ML) ORAL DAILY
Qty: 30 TAB | Refills: 0 | Status: SHIPPED | OUTPATIENT
Start: 2020-10-24

## 2020-10-23 RX ORDER — POTASSIUM CHLORIDE 20 MEQ/1
20 TABLET, EXTENDED RELEASE ORAL
Status: COMPLETED | OUTPATIENT
Start: 2020-10-23 | End: 2020-10-23

## 2020-10-23 RX ADMIN — ENOXAPARIN SODIUM 40 MG: 40 INJECTION SUBCUTANEOUS at 08:40

## 2020-10-23 RX ADMIN — RANOLAZINE 1000 MG: 500 TABLET, FILM COATED, EXTENDED RELEASE ORAL at 08:49

## 2020-10-23 RX ADMIN — DRONABINOL 5 MG: 2.5 CAPSULE ORAL at 16:48

## 2020-10-23 RX ADMIN — FERROUS SULFATE TAB 325 MG (65 MG ELEMENTAL FE) 325 MG: 325 (65 FE) TAB at 16:48

## 2020-10-23 RX ADMIN — Medication 1 TABLET: at 08:50

## 2020-10-23 RX ADMIN — PIPERACILLIN AND TAZOBACTAM 3.38 G: 3; .375 INJECTION, POWDER, LYOPHILIZED, FOR SOLUTION INTRAVENOUS at 06:00

## 2020-10-23 RX ADMIN — PIPERACILLIN AND TAZOBACTAM 3.38 G: 3; .375 INJECTION, POWDER, LYOPHILIZED, FOR SOLUTION INTRAVENOUS at 11:34

## 2020-10-23 RX ADMIN — MIDODRINE HYDROCHLORIDE 10 MG: 5 TABLET ORAL at 16:46

## 2020-10-23 RX ADMIN — POTASSIUM CHLORIDE 20 MEQ: 1500 TABLET, EXTENDED RELEASE ORAL at 11:32

## 2020-10-23 RX ADMIN — Medication 10 ML: at 03:56

## 2020-10-23 RX ADMIN — ACETAMINOPHEN 650 MG: 325 TABLET, FILM COATED ORAL at 05:53

## 2020-10-23 RX ADMIN — ACETAMINOPHEN 650 MG: 325 TABLET, FILM COATED ORAL at 16:51

## 2020-10-23 RX ADMIN — DRONABINOL 5 MG: 2.5 CAPSULE ORAL at 06:37

## 2020-10-23 RX ADMIN — PANTOPRAZOLE SODIUM 40 MG: 40 TABLET, DELAYED RELEASE ORAL at 06:37

## 2020-10-23 RX ADMIN — METOPROLOL TARTRATE 12.5 MG: 25 TABLET, FILM COATED ORAL at 11:33

## 2020-10-23 RX ADMIN — CLOPIDOGREL BISULFATE 75 MG: 75 TABLET ORAL at 08:51

## 2020-10-23 RX ADMIN — MULTIVITAMIN TABLET 1 TABLET: TABLET at 08:48

## 2020-10-23 RX ADMIN — RENO CAPS 1 CAPSULE: 100; 1.5; 1.7; 20; 10; 1; 150; 5; 6 CAPSULE ORAL at 08:46

## 2020-10-23 RX ADMIN — INFLUENZA A VIRUS A/NEBRASKA/14/2019 (H1N1) ANTIGEN (MDCK CELL DERIVED, PROPIOLACTONE INACTIVATED), INFLUENZA A VIRUS A/DELAWARE/39/2019 (H3N2) ANTIGEN (MDCK CELL DERIVED, PROPIOLACTONE INACTIVATED), INFLUENZA B VIRUS B/SINGAPORE/INFTT-16-0610/2016 ANTIGEN (MDCK CELL DERIVED, PROPIOLACTONE INACTIVATED), INFLUENZA B VIRUS B/DARWIN/7/2019 ANTIGEN (MDCK CELL DERIVED, PROPIOLACTONE INACTIVATED) 0.5 ML: 15; 15; 15; 15 INJECTION, SUSPENSION INTRAMUSCULAR at 16:46

## 2020-10-23 RX ADMIN — Medication 10 ML: at 05:59

## 2020-10-23 RX ADMIN — POTASSIUM CHLORIDE 20 MEQ: 1500 TABLET, EXTENDED RELEASE ORAL at 08:49

## 2020-10-23 RX ADMIN — INSULIN LISPRO 2 UNITS: 100 INJECTION, SOLUTION INTRAVENOUS; SUBCUTANEOUS at 08:37

## 2020-10-23 RX ADMIN — FUROSEMIDE 40 MG: 10 INJECTION, SOLUTION INTRAMUSCULAR; INTRAVENOUS at 11:34

## 2020-10-23 RX ADMIN — ALOGLIPTIN 25 MG: 25 TABLET, FILM COATED ORAL at 08:48

## 2020-10-23 RX ADMIN — FERROUS SULFATE TAB 325 MG (65 MG ELEMENTAL FE) 325 MG: 325 (65 FE) TAB at 08:52

## 2020-10-23 RX ADMIN — DOCUSATE SODIUM 100 MG: 100 CAPSULE, LIQUID FILLED ORAL at 08:47

## 2020-10-23 RX ADMIN — AZITHROMYCIN 500 MG: 500 INJECTION, POWDER, LYOPHILIZED, FOR SOLUTION INTRAVENOUS at 09:06

## 2020-10-23 RX ADMIN — MIDODRINE HYDROCHLORIDE 10 MG: 5 TABLET ORAL at 08:47

## 2020-10-23 RX ADMIN — ACETAMINOPHEN 650 MG: 325 TABLET, FILM COATED ORAL at 11:41

## 2020-10-23 RX ADMIN — MIDODRINE HYDROCHLORIDE 10 MG: 5 TABLET ORAL at 11:32

## 2020-10-23 RX ADMIN — SORBITOL SOLUTION (BULK) 30 ML: 70 SOLUTION at 16:48

## 2020-10-23 RX ADMIN — ASPIRIN 81 MG 81 MG: 81 TABLET ORAL at 08:46

## 2020-10-23 RX ADMIN — EZETIMIBE 10 MG: 10 TABLET ORAL at 08:50

## 2020-10-23 NOTE — PROGRESS NOTES
Progress Note    Patient: Eder Lyman MRN: [id number]  SSN: [id number]    YOB: 1940  Age: 79 y.o. Sex: male      Admit Date: 10/22/2020    LOS: 0 days     Subjective:     No Acute events overnight. Objective:     Vitals:    10/23/20 0000 10/23/20 0212 10/23/20 0317 10/23/20 0618   BP:  (!) 116/47 (!) 110/52    Pulse: 78 78 74    Resp:  20 20    Temp:  98.2 °F (36.8 °C) 98.1 °F (36.7 °C)    SpO2:       Weight:    83.8 kg (184 lb 11.9 oz)   Height:            Intake and Output:  Current Shift: No intake/output data recorded. Last three shifts: No intake/output data recorded. Physical Exam:   General:  Alert, cooperative, no distress, appears stated age. Eyes:  Conjunctivae/corneas clear. PERRL, EOMs intact. Fundi benign   Ears:  Normal TMs and external ear canals both ears. Nose: Nares normal. Septum midline. Mucosa normal. No drainage or sinus tenderness. Mouth/Throat: Lips, mucosa, and tongue normal. Teeth and gums normal.   Neck: Supple, symmetrical, trachea midline, no adenopathy, thyroid: no enlargment/tenderness/nodules, no carotid bruit and no JVD. Back:   Symmetric, no curvature. ROM normal. No CVA tenderness. Lungs:   Clear to auscultation bilaterally. Heart:  Regular rate and rhythm, S1, S2 normal, no murmur, click, rub or gallop. Abdomen:   Soft, non-tender. Bowel sounds normal. No masses,  No organomegaly. Extremities: Extremities normal, atraumatic, no cyanosis or edema. Pulses: 2+ and symmetric all extremities. Skin: Skin color, texture, turgor normal. No rashes or lesions   Lymph nodes: Cervical, supraclavicular, and axillary nodes normal.   Neurologic: CNII-XII intact. Normal strength, sensation and reflexes throughout. Lab/Data Review: All lab results for the last 24 hours reviewed.          Assessment:     Active Problems:    Chest pain at rest (10/22/2020)    Mr. Jose Liao is an 79-year-old -American male with:   1.  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  Tremor Video September 2017   2.  Chest pain atypical for angina   3.  Coronary artery disease (previous MI and status post coronary CABG)   4.  Hypertension with hypertensive heart disease   5.  Mixed Dyslipidemia      6.  Peripheral vessel disease   7.  Type 2 diabetes mellitus   8.  Former tobacco use   9.  History of thyroid disease   10.  Moderate pulmonary hypertension, RVSP 57 mmHg   11.  History of bleeding per rectum and moderately severe diverticulosis of the descending colon and sigmoid colon. Plan:     Troponin were negative. Ejection fraction appears to be stable. Supportive care. .  Monitor hemoglobin closely. Consider GI evaluation as a cause of his chest pain.     Signed By: Brina Garibay MD     October 23, 2020

## 2020-10-23 NOTE — PROGRESS NOTES
Addendum: Patient accepted to room 105 at [address] [place] [address] and Rehab. Discharge for today. Provided discharge information and contact information for SNF to [facility]. Discharge checklist form completed with Brigid lozoya RN. Patient cleared for discharge from CM standpoint. LINDA plan: return to [address] [place] [address] and Rehab SNF    CM spoke with patient who is OK with returning to [address] [place] [address] and Rehab SNF. CM confirmed with daughter, Sandra Weinstein ([phone]), that patient will return to [address]. Choice letter completed and referral sent. CM spoke with Suki in admissions at [place] [address]; waiting on bed assignment. Will follow.

## 2020-10-23 NOTE — ROUTINE PROCESS
Verbal change of shift report provided to oncoming nurse, Seamus Dougherty RN, by Bhargavi Cedillo (offgoing nurse). Report included SBAR, relevant labs, diagnostics and assessment data, as well as opportunities to ask questions.

## 2020-10-23 NOTE — PROGRESS NOTES
4-eye skin assessment performed by writer Merary Laws RN) and Arely Rolle RN. Patient noted to have healed bilateral gluteal pressure wounds, as well as a healed wound on left inner ankle. No other wounds noted; skin otherwise intact.

## 2020-10-23 NOTE — PROGRESS NOTES
Primary nurse made aware of pt's morning blood pressures by nursing student and instructor. Pt is currently asymptomatic.

## 2020-10-23 NOTE — DISCHARGE SUMMARY
Hospitalist Discharge Summary     Patient ID:  Charlie Sifuentes  [id number]  40 y.o.  1940  10/22/2020    PCP on record: Anny Salgado MD    Admit date: 10/22/2020  Discharge date and time: 10/23/2020    DISCHARGE DIAGNOSIS:    PNA/CHF    CONSULTATIONS:  IP CONSULT TO HOSPITALIST  IP CONSULT TO CARDIOLOGY  IP CONSULT TO UROLOGY    Excerpted HPI from H&P of Rena Milian MD:  34-year-old male with past medical history of multiple comorbidities presents to [facility] with complaints of chest pain. Patient states he was in his usual state of health until earlier this a.m. when he started sprinting sudden onset intermittent left-sided chest pressure which is moderate severe in intensity, nonradiating with no aggravating or alleviating factors following which patient presented to the ED. Of note patient is complaining of abdominal distention and an inability to pass urine over the past 6 hours. Patient denies any fevers chills nausea vomiting lightheadedness dizziness orthopnea paroxysmal nocturnal dyspnea palpitations headache focal weakness loss of sensation auditory or visual symptoms or stool complaints. Of note patient does present from a nursing home    ______________________________________________________________________  DISCHARGE SUMMARY/HOSPITAL COURSE:  for full details see H&P, daily progress notes, labs, consult notes.      Patient was subsequently admitted to [facility] with a diagnosis of CHF exacerbation as well as a pneumonia, patient received IV Lasix, diuresed well, patient's cardiac enzymes were trended, patient received a transthoracic echo, of note patient was found to have urinary retention following which a Tellez catheter was placed, subsequent to which patient's clinical status improved significantly, patient was deemed stable for discharge to rehab facility with outpatient follow-up with cardiology as well as urology as well as the patient's primary care physician, plan explained to patient in detail who was agreeable with the plan. Of note pt was complaining of bilateral lower extremity numbness/tingling and initiated on neurontin 100mg tid    _______________________________________________________________________  Patient seen and examined by me on discharge day. Pertinent Findings:  Gen:    Not in distress  Chest: Decreased air entry bilaterally in bilateral lower lung zones  CVS:   Regular rhythm. No edema  Abd:  Soft, not distended, not tender  Neuro:  Alert, oriented x 4  _______________________________________________________________________  DISCHARGE MEDICATIONS:   Current Discharge Medication List      START taking these medications    Details   aspirin 81 mg chewable tablet Take 1 Tab by mouth daily. Qty: 30 Tab, Refills: 0      levoFLOXacin (Levaquin) 750 mg tablet Take 1 Tab by mouth daily. Qty: 5 Tab, Refills: 0         CONTINUE these medications which have CHANGED    Details   furosemide (LASIX) 40 mg tablet TAKE ONE TABLET BY MOUTH ONE TIME DAILY  Qty: 30 Tab, Refills: 0      Januvia 100 mg tablet TAKE ONE TABLET BY MOUTH ONE TIME DAILY  Qty: 30 Tab, Refills: 0         CONTINUE these medications which have NOT CHANGED    Details   alogliptin (NESINA) 25 mg tablet Take 1 Tab by mouth daily. Qty: 30 Tab, Refills: 0      dronabinoL (MARINOL) 5 mg capsule Take 1 Cap by mouth Before breakfast and dinner. Max Daily Amount: 10 mg.  Qty: 60 Cap, Refills: 0    Associated Diagnoses: Adult failure to thrive      midodrine (PROAMATINE) 10 mg tablet Take 1 Tab by mouth three (3) times daily (with meals) for 30 days. Indications: hypotension  Qty: 90 Tab, Refills: 0    Comments: Hold for sbp>130 mmhg      multivitamin (ONE A DAY) tablet Take 1 Tab by mouth daily. Qty: 30 Tab, Refills: 0      atorvastatin (LIPITOR) 20 mg tablet Take 1 Tab by mouth nightly.   Qty: 30 Tab, Refills: 0      metoprolol tartrate (LOPRESSOR) 25 mg tablet Take 0.5 Tabs by mouth every twelve (12) hours. Qty: 60 Tab, Refills: 0      b complex-vitamin c-folic acid (NEPHROCAPS) 1 mg capsule Take 1 Cap by mouth daily. Qty: 30 Cap, Refills: 0      docusate sodium (COLACE) 100 mg capsule Take 1 Cap by mouth daily for 90 days. Qty: 30 Cap, Refills: 2      polyethylene glycol (MIRALAX) 17 gram packet Take 1 Packet by mouth daily as needed for Constipation. Qty: 30 Packet, Refills: 0      potassium chloride (K-DUR, KLOR-CON) 20 mEq tablet Take 1 Tab by mouth daily. Qty: 30 Tab, Refills: 0      clopidogrel (PLAVIX) 75 mg tab TAKE ONE TABLET BY MOUTH ONE TIME DAILY  Refills: 3      ezetimibe (ZETIA) 10 mg tablet TAKE ONE TABLET BY MOUTH AT BEDTIME  Refills: 1      ranolazine ER (RANEXA) 1,000 mg TAKE ONE TABLET BY MOUTH TWICE A DAY  Refills: 4      levothyroxine (SYNTHROID) 88 mcg tablet TAKE ONE TABLET BY MOUTH EVERY MORNING  Refills: 1      cyanocobalamin (VITAMIN B12) 1,000 mcg/mL injection INJECT 1 ML SUBCUTANEOUSLY ONCE MONTHLY  Refills: 5      ferrous sulfate 325 mg (65 mg iron) tablet TAKE ONE TABLET BY MOUTH TWICE A DAY  Refills: 1      pantoprazole (PROTONIX) 40 mg tablet TAKE ONE TABLET BY MOUTH EVERY MORNING  Refills: 1      nitroglycerin (NITROSTAT) 0.4 mg SL tablet 0.4 mg by SubLINGual route every five (5) minutes as needed for Chest Pain. Up to 3 doses. cholecalciferol (VITAMIN D3) 1,000 unit tablet Take  by mouth daily. insulin detemir (LEVEMIR FLEXTOUCH U-100 INSULN SC) by SubCUTAneous route. Indications: as needed for BS > 150 once a day then take 10 units               Patient Follow Up Instructions: Activity: PT/OT Eval and Treat  Diet: Cardiac Diet  Wound Care: As directed    Follow-up with PCP/Dr. Aparicio/Dr. Melodie Pa in 2 weeks.   Follow-up tests/labs As per PCP/Dr. Argentina Art  Follow-up Information     Follow up With Specialties Details Why Contact Info    Maia Griggs MD Internal Medicine In 2 weeks  [address] [place] [address] Anitha Sr MD Urology In 2 weeks  [facility] [address]  [phone]      Lm Gale MD Cardiology, Cardio Vascular Surgery In 2 weeks  [facility] [address]  [phone]          ________________________________________________________________    Risk of deterioration: Moderate    Condition at Discharge:  Stable  __________________________________________________________________    Disposition  SNF/LTC    ____________________________________________________________________    Code Status: Full Code  ___________________________________________________________________      Total time in minutes spent coordinating this discharge (includes going over instructions, follow-up, prescriptions, and preparing report for sign off to her PCP) :  40 minutes    Signed:   Ciaran Booker MD

## 2020-10-23 NOTE — PROGRESS NOTES
Patient discharged to [facility]. Reported called to charge nurse. Dr. Deya Bahena stated patient okay to hold merinol at [facility] due to no availible script. Stated patient should continue levequin for 7 days. These orders were communicated to [facility]. All IVs removed and belongings accounted for by patient. Patient verbalized understanding of plan. Patient left floor via EMS.

## 2020-10-23 NOTE — PROGRESS NOTES
Problem: Mobility Impaired (Adult and Pediatric)  Goal: *Acute Goals and Plan of Care (Insert Text)  Description: Physical Therapy Goals  Initiated 10/23/20  1)  I with HEP in 7 days to improve overall functional mobility. 2)  Bed mobility with sup in 7 days to prevent skin breakdown. 3)  Pt to perform stand pivot transfer from bed to chair with mod A in 7 days. Pt. Goal:  to be able to walk again. Outcome: Not Met  PHYSICAL THERAPY EVALUATION  Patient: Zoraida Guerrero (89 y.o. male)  Date: 10/23/2020  Primary Diagnosis: Chest pain at rest [R07.9]        Precautions: fall       ASSESSMENT  Based on the objective data described below, the patient presents with decreased LE strength, difficulty with bed mobility requiring min-mod A, difficulty with transfers that even with max A x 2, pt still unable to stand up from the bed, and pt unable to amb. Pt reports it has been 4 months since he last ambulated and that he has been in and out of rehab facilities following his hospital stays. Pt reports he can do most of his ADL's without assist.  Pt sat EOB for around 30 min today as he was sitting EOB with OT prior to my arrival performing self care activities. Pt with good sitting balance. Pt's sensation to LT is equal bilaterally, but pt still reports it feels like his \"legs aren't there\" which makes it difficult for him to stand. Other factors to consider for discharge: impaired mobility     Patient will benefit from skilled therapy intervention to address the above noted impairments. PLAN :  Recommendations and Planned Interventions: bed mobility training, transfer training, therapeutic exercises, patient and family training/education, and therapeutic activities      Frequency/Duration: Patient will be followed by physical therapy:  5 times a week to address goals.     Recommendation for discharge: (in order for the patient to meet his/her long term goals)  SNF    This discharge recommendation:  Has been made in collaboration with the attending provider and/or case management    IF patient discharges home will need the following DME: none         SUBJECTIVE:   Patient stated I won't be able to walk today. I can't feel my legs.     OBJECTIVE DATA SUMMARY:   HISTORY:    Past Medical History:   Diagnosis Date    Diabetes ([facility] [address]     Essential hypertension     Hyperlipidemia     Myocardial infarction [facility])     Pacemaker     Pulmonary hypertension ([facility] [address] 10/16/2020     Past Surgical History:   Procedure Laterality Date    HX CORONARY ARTERY BYPASS GRAFT      MA INSJ/RPLCMT PERM DFB W/TRNSVNS LDS 1/DUAL CHMBR N/A 5/17/2019    pm-> ICD upgrade, RV lead, Medtronic performed by Yolanda Meyer MD at [facility] CATH LAB       Personal factors and/or comorbidities impacting plan of care: impaired mobility, deconditioning    Home Situation  Home Environment: Apartment  One/Two Story Residence: One story  Living Alone: No  Support Systems: Family member(s)  Patient Expects to be Discharged to[de-identified] Skilled nursing facility  Current DME Used/Available at Home: Adaptive dressing aides, Grab bars, Hospital bed, Walker, rolling, Wheelchair    PLOF: Pt I for ADLS/IADLS, A with mobility prior to admission. EXAMINATION/PRESENTATION/DECISION MAKING:   Critical Behavior:  Neurologic State: Alert  Orientation Level: Disoriented to time     Safety/Judgement: Insight into deficits  Hearing:   Auditory  Auditory Impairment: Hard of hearing, bilateral  Range Of Motion:  AROM: Within functional limits                       Strength:    Strength: Generally decreased, functional      Right Left   HIP FLEXION 3-/5 3-/5   HIP EXTENSION     HIP ABDUCTION     HIP ADDUCTION     KNEE FLEXION 4-/5 4-/5   KNEE EXTENSION 3-/5 3-/5   ANKLE PF  4/5 4/5   ANKLE DF 4/5 4/5                     Tone & Sensation:                  Sensation: Intact               Coordination:  Coordination: Within functional limits       Functional Mobility:  Bed Mobility:  Rolling: Minimum assistance;Assist x1  Supine to Sit: Minimum assistance;Assist x1  Sit to Supine: Minimum assistance;Assist x2  Scooting: Maximum assistance;Assist x2(to HOB)  Transfers:  Sit to Stand: Maximum assistance;Assist x2  Stand to Sit: Maximum assistance;Assist x2    Pt attempted sit to stand x 2 reps and pt was only able to clear buttocks off the bed about 6 inches even with max A x 2                       Balance:   Sitting: Intact; With support(pt EOB used dulce hands)  Ambulation/Gait Training:   Pt unable to stand today. Therapeutic Exercises:   Pt performed sitting marches, LAQ, and ankle pumps x 10 each. Functional Measure:    Krystina Barraza AM-PAC 6 Clicks         Basic Mobility Inpatient Short Form  How much difficulty does the patient currently have. .. Unable A Lot A Little None   1. Turning over in bed (including adjusting bedclothes, sheets and blankets)? [] 1   [] 2   [x] 3   [] 4   2. Sitting down on and standing up from a chair with arms ( e.g., wheelchair, bedside commode, etc.)   [x] 1   [] 2   [] 3   [] 4   3. Moving from lying on back to sitting on the side of the bed? [] 1   [] 2   [x] 3   [] 4          How much help from another person does the patient currently need. .. Total A Lot A Little None   4. Moving to and from a bed to a chair (including a wheelchair)? [] 1   [x] 2   [] 3   [] 4   5. Need to walk in hospital room? [x] 1   [] 2   [] 3   [] 4   6. Climbing 3-5 steps with a railing? [x] 1   [] 2   [] 3   [] 4   © 2007, Trustees of Krystina Barraza, under license to [employer]. All rights reserved     Score:  Initial: 11 Most Recent: X (Date:10/23/20)   Interpretation of Tool:  Represents activities that are increasingly more difficult (i.e. Bed mobility, Transfers, Gait).   Score 24 23 22-20 19-15 14-10 9-7 6   Modifier CH CI CJ CK CL CM CN          Physical Therapy Evaluation Charge Determination   History Examination Presentation Decision-Making   MEDIUM  Complexity : 1-2 comorbidities / personal factors will impact the outcome/ POC  MEDIUM Complexity : 3 Standardized tests and measures addressing body structure, function, activity limitation and / or participation in recreation  LOW Complexity : Stable, uncomplicated  Other Functional Measure [facility] 6 low      Based on the above components, the patient evaluation is determined to be of the following complexity level: LOW     Pain Ratin/10    Activity Tolerance:   Fair and requires rest breaks  Please refer to the flowsheet for vital signs taken during this treatment. After treatment patient left in no apparent distress:   Supine in bed, Call bell within reach, and Side rails x 3    COMMUNICATION/EDUCATION:   The patients plan of care was discussed with: Occupational therapist and Registered nurse. Patient/family agree to work toward stated goals and plan of care.     Thank you for this referral.  Lori Edge   Time Calculation: 24 mins

## 2020-10-23 NOTE — PROGRESS NOTES
Problem: Self Care Deficits Care Plan (Adult)  Goal: *Acute Goals and Plan of Care (Insert Text)  Description: 1. Patient will be min A with donning/doffing socks seated in chair  2. Patient will be mod A with LB bathing EOB/chair  3. Patient will be mod A with toilet transfer using LRAD  4. Patient will be set up/SBA with toileting hygiene  5. Patient will tolerate 10 + min ASHANTI UE TE to promote greater independence with ADL activities  Outcome: Not Met   OCCUPATIONAL THERAPY EVALUATION  Patient: Roque Gama (70 y.o. male)  Date: 10/23/2020  Primary Diagnosis: Chest pain at rest [R07.9]        Precautions: Fall risk       ASSESSMENT  Based on the objective data described below, the patient presents with deficits in ADL and functional mobility tasks due to the following limitations: decreased balance, generalized weakness, decreased activity tolerance, edema ashanti LEs (feet), and pain. Current Level of Function Impacting Discharge (ADLs/self-care): Dependent with LB dressing, toileting, and LB bathing;  Min A for UB bathing;  set up/SBA grooming EOB; Mod I feeding;  Min A supine <> sit EOB    Functional Outcome Measure: The patient scored 12/24 on the AM-PAC outcome measure which is indicative of need for mod + assistance for ADLs at this time. Other factors to consider for discharge: time since onset, severity of deficits     Patient will benefit from skilled therapy intervention to address the above noted impairments. PLAN :  Recommendations and Planned Interventions: self care training, functional mobility training, therapeutic exercise, balance training, therapeutic activities, endurance activities, patient education, home safety training, and family training/education    Frequency/Duration: Patient will be followed by occupational therapy 5 times a week to address goals.     Recommendation for discharge: (in order for the patient to meet his/her long term goals)  SNF    This discharge recommendation:  Has been made in collaboration with the attending provider and/or case management    IF patient discharges home will need the following DME: AE: long handled bathing       SUBJECTIVE:   Patient stated I haven't been home in a while.     OBJECTIVE DATA SUMMARY:   HISTORY:   Past Medical History:   Diagnosis Date    Diabetes ([facility] [address]     Essential hypertension     Hyperlipidemia     Myocardial infarction [facility])     Pacemaker     Pulmonary hypertension ([facility] [address] 10/16/2020     Past Surgical History:   Procedure Laterality Date    HX CORONARY ARTERY BYPASS GRAFT      MT INSJ/RPLCMT PERM DFB W/TRNSVNS LDS 1/DUAL CHMBR N/A 5/17/2019    pm-> ICD upgrade, RV lead, Medtronic performed by Marizol Louis MD at [facility] CATH LAB       Expanded or extensive additional review of patient history:     Home Situation  Home Environment: Apartment  One/Two Story Residence: One story  Living Alone: No  Support Systems: Family member(s)  Patient Expects to be Discharged to[de-identified] Skilled nursing facility  Current DME Used/Available at Home: Adaptive dressing aides, Grab bars, Hospital bed, Walker, rolling, Wheelchair    PLOF: Pt mod I for ADLS/IADLS, mod I with mobility prior to admission/at home. Hand dominance: Right    EXAMINATION OF PERFORMANCE DEFICITS:  Cognitive/Behavioral Status:  Neurologic State: Alert  Orientation Level: Disoriented to time           Safety/Judgement: Insight into deficits    Skin:   Appeared intact    Edema:   DEVIN feet with visual inspection    Hearing:   Auditory  Auditory Impairment: Hard of hearing, bilateral    Vision/Perceptual:       Per pt, has had cataract removed from one eye, awaiting surgery on other eye -- doctor waiting to prescribe glasses at this time         Range of Motion:  DEVIN UE  AROM: Within functional limits           Strength:  Devin UE  Strength: Generally decreased, functional        Coordination:  Coordination: Within functional limits  Fine Motor Skills-Upper: Left Intact; Right Intact    Gross Motor Skills-Upper: Left Intact; Right Intact    Tone & Sensation:  Devin UE     Sensation: Intact           Balance:  Sitting: Intact; With support(pt EOB used devin hands)    Functional Mobility and Transfers for ADLs:  Bed Mobility:  Rolling: Supervision;Stand-by assistance  Supine to Sit: Minimum assistance;Assist x1  Scooting: Stand-by assistance    Transfers:       ADL Assessment:  Feeding: Modified independent    Oral Facial Hygiene/Grooming: Setup              Lower Body Dressing: Total assistance    Toileting: Total assistance                ADL Intervention and task modifications:       Grooming  Position Performed: Seated edge of bed    Bathing  UB Bathing: Min A EOB  LB Bathing: Dependent            Lower Body Dressing Assistance  Socks: Total assistance (dependent)    Toileting  Bladder Hygiene: Total assistance (dependent)(zuñiga cath)  Bowel Hygiene: Total assistance (dependent)(adult briefs)    Cognitive Retraining  Safety/Judgement: Insight into deficits      Functional Measure:    [address] [place] [id number] AM-PACTM \"6 Clicks\"                                                       Daily Activity Inpatient Short Form  How much help from another person does the patient currently need. .. Total; A Lot A Little None   1. Putting on and taking off regular lower body clothing? [x]  1 []  2 []  3 []  4   2. Bathing (including washing, rinsing, drying)? []  1 [x]  2 []  3 []  4   3. Toileting, which includes using toilet, bedpan or urinal? [x] 1 []  2 []  3 []  4   4. Putting on and taking off regular upper body clothing? []  1 [x]  2 []  3 []  4   5. Taking care of personal grooming such as brushing teeth? []  1 []  2 [x]  3 []  4   6. Eating meals? []  1 []  2 [x]  3 []  4   © 2007, Trustees of [address] [place] [address] [address] [address], under license to [employer].  All rights reserved     Score: 12/24     Interpretation of Tool:  Represents clinically-significant functional categories (i.e. Activities of daily living). Percentage of Impairment CH    0%   CI    1-19% CJ    20-39% CK    40-59% CL    60-79% CM    80-99% CN     100%   [facility]  Score 6-24 24 23 20-22 15-19 10-14 7-9 6        Occupational Therapy Evaluation Charge Determination   History Examination Decision-Making   MEDIUM Complexity : Expanded review of history including physical, cognitive and psychosocial  history  MEDIUM Complexity : 3-5 performance deficits relating to physical, cognitive , or psychosocial skils that result in activity limitations and / or participation restrictions MEDIUM Complexity : Patient may present with comorbidities that affect occupational performnce. Miniml to moderate modification of tasks or assistance (eg, physical or verbal ) with assesment(s) is necessary to enable patient to complete evaluation       Based on the above components, the patient evaluation is determined to be of the following complexity level: MEDIUM  Pain Ratin/10    Activity Tolerance:   Fair  Please refer to the flowsheet for vital signs taken during this treatment. After treatment patient left in no apparent distress:    Seated EOB, accompanied by physical therapy    COMMUNICATION/EDUCATION:   The patients plan of care was discussed with: Physical therapist.     Patient/family have participated as able in goal setting and plan of care. This patients plan of care is appropriate for delegation to [facility].     Thank you for this referral.  Marquez Pereyra OTR/L  Time Calculation: 23 mins

## 2021-01-11 PROBLEM — R33.9 URINARY RETENTION: Status: ACTIVE | Noted: 2021-01-11

## 2021-06-25 NOTE — ROUTINE PROCESS
Bedside and Verbal shift change report given to Rafael Saenz RN (oncoming nurse) by Celina Parish RN  (offgoing nurse). Report included the following information SBAR, Intake/Output, MAR and Cardiac Rhythm AV paced. Telephone visit scheduled for today at 4810 with patient   normal, alert, in no acute distress, well developed, well nourished, ambulating without difficulty, normal communication ability

## 2022-02-05 NOTE — ED PROVIDER NOTES
HPI   Chief Complaint   Patient presents with    Nausea     80yoM pwith DM, HTN, MI and pacemaker presents with nausea. Pt reports that for the past 3 days he has felt nauseous and has had intermittent emesis. He is clutching a bag of half eaten cake and cookies. When questioned about the cake and cookies he says those and V8 are the only things he has been able to tolerate. He further explains that his glucose has been as high as 500s in the past few days, and when his rehab tried to treat him it got down to 41 and he felt \"bad. \" He also complains of SOB, orthopnea and b/l leg edema. He denies chest pain, cough, fevers. Past Medical History:   Diagnosis Date    Diabetes ([facility] [address]     Essential hypertension     Hyperlipidemia     Myocardial infarction [facility])     Pacemaker        Past Surgical History:   Procedure Laterality Date    HX CORONARY ARTERY BYPASS GRAFT      AR INSJ/RPLCMT PERM DFB W/TRNSVNS LDS 1/DUAL CHMBR N/A 5/17/2019    pm-> ICD upgrade, RV lead, Medtronic performed by Ryley Jones MD at [address] [facility] CATH LAB         No family history on file.     Social History     Socioeconomic History    Marital status:      Spouse name: Not on file    Number of children: Not on file    Years of education: Not on file    Highest education level: Not on file   Occupational History    Not on file   Social Needs    Financial resource strain: Not on file    Food insecurity     Worry: Not on file     Inability: Not on file    Transportation needs     Medical: Not on file     Non-medical: Not on file   Tobacco Use    Smoking status: Former Smoker    Smokeless tobacco: Never Used   Substance and Sexual Activity    Alcohol use: Not Currently    Drug use: Not on file    Sexual activity: Not on file   Lifestyle    Physical activity     Days per week: Not on file     Minutes per session: Not on file    Stress: Not on file   Relationships    Social connections     Talks on phone: Not on file 0 = swallows foods/liquids without difficulty Gets together: Not on file     Attends [employer] service: Not on file     Active member of club or organization: Not on file     Attends meetings of clubs or organizations: Not on file     Relationship status: Not on file    Intimate partner violence     Fear of current or ex partner: Not on file     Emotionally abused: Not on file     Physically abused: Not on file     Forced sexual activity: Not on file   Other Topics Concern    Not on file   Social History Narrative    Not on file         ALLERGIES: Bactrim [sulfamethoprim]    Review of Systems   Respiratory: Positive for shortness of breath. Orthopnea   Gastrointestinal: Positive for nausea and vomiting. Endocrine:        High glucose   All other systems reviewed and are negative. Vitals:    09/22/20 1647 09/22/20 2000 09/22/20 2109   BP: (!) 94/50 102/68    Pulse: 66     Resp: 18 18    Temp: 97.8 °F (36.6 °C)     SpO2:  (!) 89% 97%   Weight: 83.9 kg (185 lb)     Height: 5' 6\" (1.676 m)              Physical Exam   Patient Vitals for the past 8 hrs:   Temp Pulse Resp BP SpO2   09/22/20 2109     97 %   09/22/20 2000   18 102/68 (!) 89 %   09/22/20 1647 97.8 °F (36.6 °C) 66 18 (!) 94/50         Nursing note and vitals reviewed. Constitutional: NAD. Clutching a bag of half eaten cake and small bags of cookies. Head: Normocephalic and atraumatic. Mouth/Throat: Airway patent. Moist mucous membranes. Eyes: EOMI. No scleral icterus. Neck: Neck supple. + JVD. Cardiovascular: Normal rate, regular rhythm. Very distant heart sounds. Good pulses throughout. Pulmonary/Chest: No respiratory distress. Bibasilar crackles. Abdominal/GI: BS normal, Soft, non-tender, mildly distended. No rebound or guarding. Musculoskeletal: No gross injuries or deformities. 3+ symmetric pitting leg edema. Left leg has a non-erythematous intact blister. Neurological: Alert and oriented to person, place, and time. Cranial Nerves 2-12 intact.  Moving all extremities. No gross deficits. Psych: Pleasant, cooperative. Skin: Skin is warm and dry. No rash noted. MDM   DDx = decompensated heart failure, ACS, arrhythmia, uncontrolled DM, metabolic disarray; lower suspicion for GI obstruction, PE and covid-19. Has UTI, given CTX. Anticipate admission for CHF. Pt signed out to Dr. Vilma Santiago to await blood work.    Labs Reviewed   URINALYSIS W/MICROSCOPIC - Abnormal; Notable for the following components:       Result Value    Urobilinogen 4.0 (*)     Leukocyte Esterase Moderate (*)     Mucus Trace (*)     All other components within normal limits   CULTURE, URINE   BNP   CBC WITH AUTOMATED DIFF   METABOLIC PANEL, COMPREHENSIVE   LIPASE   TROPONIN I     XR CHEST PORT   Final Result   Impression: No evidence of acute intrathoracic lesion        Medications   furosemide (LASIX) injection 40 mg (has no administration in time range)   cefTRIAXone (ROCEPHIN) 1 g in 0.9% sodium chloride (MBP/ADV) 50 mL MBP (has no administration in time range)              Procedures

## 2022-03-18 PROBLEM — L98.9 SKIN LESION OF LEFT LEG: Status: ACTIVE | Noted: 2020-10-16

## 2022-03-18 PROBLEM — I25.10 CAD (CORONARY ARTERY DISEASE): Status: ACTIVE | Noted: 2020-10-16

## 2022-03-19 PROBLEM — R33.9 URINARY RETENTION: Status: ACTIVE | Noted: 2021-01-11

## 2022-03-19 PROBLEM — I50.9 HEART FAILURE (HCC): Status: ACTIVE | Noted: 2020-09-23

## 2022-03-19 PROBLEM — I42.9 CARDIOMYOPATHY (HCC): Status: ACTIVE | Noted: 2019-05-17

## 2022-03-19 PROBLEM — I44.7 LEFT BUNDLE BRANCH BLOCK: Status: ACTIVE | Noted: 2019-04-23

## 2022-03-19 PROBLEM — I25.5 ISCHEMIC CARDIOMYOPATHY: Status: ACTIVE | Noted: 2019-04-23

## 2022-03-19 PROBLEM — R53.1 ASTHENIA: Status: ACTIVE | Noted: 2020-10-16

## 2022-03-19 PROBLEM — R62.7 ADULT FAILURE TO THRIVE: Status: ACTIVE | Noted: 2020-09-23

## 2022-03-19 PROBLEM — I27.20 PULMONARY HYPERTENSION (HCC): Status: ACTIVE | Noted: 2020-10-16

## 2022-03-19 PROBLEM — R07.9 CHEST PAIN AT REST: Status: ACTIVE | Noted: 2020-10-22

## 2022-03-19 PROBLEM — Z95.0 PACEMAKER: Status: ACTIVE | Noted: 2020-10-16

## 2022-03-20 PROBLEM — E11.9 DM2 (DIABETES MELLITUS, TYPE 2) (HCC): Status: ACTIVE | Noted: 2020-10-16

## 2022-03-20 PROBLEM — I73.9 PVD (PERIPHERAL VASCULAR DISEASE) (HCC): Status: ACTIVE | Noted: 2020-10-16

## 2022-05-09 NOTE — Clinical Note
Karen Griffiths         : 1972  [x] [address]     [] [address]      [] Adam     []Ángela    [] Meet Medel  [] [facility]   [] Other:  Diagnosis: [x] THERESA (G47.33) [] CSA (G47.31) [] Apnea (G47.30)   Length of Need: [] 12 Months [x] 99 Months [] Other:    Machine (JESUS!): [] Respironics Dream Station      Auto [x] ResMed AirSense     Auto [] Other:     [x]  CPAP () [] Bilevel ()   Mode: [x] Auto [] Spontaneous    Mode: [] Auto [] Spontaneous           17 cm                 Comfort Settings:   - Ramp Pressure: 7 cmH2O                                        - Ramp time: 15 min                                     -  Flex/EPR - 3 full time                                    - For ResMed Bilevel (TiMax-4 sec   TiMin- 0.2 sec)     Humidifier: [x] Heated ()        [x] Water chamber replacement ()/ 1 per 6 months        Mask:  Please always start with the mask the patient used during the titraion   [x] Nasal () /1 per 3 months [x] Full Face () /1 per 3 months   [x] Patient choice -Size and fit mask [x] Patient Choice - Size and fit mask   [] Dispense:   small Airfit P10 nasal pillows  [] Dispense:    [x] Headgear () / 1 per 3 months [x] Headgear () / 1 per 3 months   [x] Replacement Nasal Cushion ()/2 per month [x] Interface Replacement ()/1 per month   [x] Replacement Nasal Pillows ()/2 per month         Tubing: [x] Heated ()/1 per 3 months    [] Standard ()/1 per 3 months [] Other:           Filters: [x] Non-disposable ()/1 per 6 months     [x] Ultra-Fine, Disposable ()/2 per month        Miscellaneous: [x] Chin Strap ()/ 1 per 6 months [] O2 bleed-in:       LPM   [] Oximetry on CPAP/Bilevel []  Other:          Start Order Date: 22    MEDICAL JUSTIFICATION:  I, the undersigned, certify that the above prescribed supplies are medically necessary for this patients wellbeing.   In my opinion, the supplies are both reasonable and Right ventricle lead inserted. , advanced. and positioned. necessary in reference to accepted standards of medicalpractice in treatment of this patients condition.     Letty Lazaro MD      NPI: [id number]       Order Signed Date: 05/09/22    Electronically signed by Letty Lazaro MD on 5/9/2022 at 8:23 AM

## 2023-05-20 RX ORDER — PANTOPRAZOLE SODIUM 40 MG/1
1 TABLET, DELAYED RELEASE ORAL EVERY MORNING
COMMUNITY
Start: 2019-02-23

## 2023-05-20 RX ORDER — DRONABINOL 5 MG/1
5 CAPSULE ORAL
COMMUNITY
Start: 2020-10-16

## 2023-05-20 RX ORDER — NITROGLYCERIN 0.4 MG/1
0.4 TABLET SUBLINGUAL
COMMUNITY

## 2023-05-20 RX ORDER — LEVOFLOXACIN 750 MG/1
750 TABLET ORAL DAILY
COMMUNITY
Start: 2020-10-23

## 2023-05-20 RX ORDER — ATORVASTATIN CALCIUM 20 MG/1
20 TABLET, FILM COATED ORAL
COMMUNITY
Start: 2020-10-16

## 2023-05-20 RX ORDER — ALOGLIPTIN 25 MG/1
25 TABLET, FILM COATED ORAL DAILY
COMMUNITY
Start: 2020-10-17

## 2023-05-20 RX ORDER — LEVOTHYROXINE SODIUM 88 UG/1
1 TABLET ORAL EVERY MORNING
COMMUNITY
Start: 2019-03-25

## 2023-05-20 RX ORDER — POLYETHYLENE GLYCOL 3350 17 G/17G
17 POWDER, FOR SOLUTION ORAL DAILY PRN
COMMUNITY
Start: 2020-10-09

## 2023-05-20 RX ORDER — POTASSIUM CHLORIDE 20 MEQ/1
20 TABLET, EXTENDED RELEASE ORAL DAILY
COMMUNITY
Start: 2020-10-10

## 2023-05-20 RX ORDER — CYANOCOBALAMIN 1000 UG/ML
INJECTION, SOLUTION INTRAMUSCULAR; SUBCUTANEOUS
COMMUNITY
Start: 2019-03-25

## 2023-05-20 RX ORDER — FUROSEMIDE 40 MG/1
1 TABLET ORAL DAILY
COMMUNITY
Start: 2020-10-23

## 2023-05-20 RX ORDER — ASPIRIN 81 MG/1
81 TABLET, CHEWABLE ORAL DAILY
COMMUNITY
Start: 2020-10-24

## 2023-05-20 RX ORDER — EZETIMIBE 10 MG/1
1 TABLET ORAL NIGHTLY
COMMUNITY
Start: 2019-04-18

## 2023-05-20 RX ORDER — RANOLAZINE 1000 MG/1
1 TABLET, EXTENDED RELEASE ORAL 2 TIMES DAILY
COMMUNITY
Start: 2019-04-18

## 2023-05-20 RX ORDER — CLOPIDOGREL BISULFATE 75 MG/1
1 TABLET ORAL DAILY
COMMUNITY
Start: 2019-04-02

## 2023-05-20 RX ORDER — GABAPENTIN 100 MG/1
100 CAPSULE ORAL 3 TIMES DAILY
COMMUNITY
Start: 2020-10-23

## 2023-05-20 RX ORDER — FERROUS SULFATE 325(65) MG
1 TABLET ORAL 2 TIMES DAILY
COMMUNITY
Start: 2019-03-21

## (undated) DEVICE — INTRO SHTH 9FR 13X20CM -- USE ITEM# 341577

## (undated) DEVICE — PINNACLE INTRODUCER SHEATH: Brand: PINNACLE

## (undated) DEVICE — GUIDE COR SNUS L135CM DIA0.035IN SHTH L50CM DIA9FR BRAID

## (undated) DEVICE — PACEMAKER PACK: Brand: MEDLINE INDUSTRIES, INC.

## (undated) DEVICE — 3M™ IOBAN™ 2 ANTIMICROBIAL INCISE DRAPE 6640EZ: Brand: IOBAN™ 2

## (undated) DEVICE — SUTURE VCRL SZ 2-0 L36IN ABSRB UD L40MM CT 1/2 CIR J957H

## (undated) DEVICE — REM POLYHESIVE ADULT PATIENT RETURN ELECTRODE: Brand: VALLEYLAB

## (undated) DEVICE — PLASMABLADE PS200-040 4.0: Brand: PLASMABLADE™

## (undated) DEVICE — SUTURE ETHBND EXCEL SZ 2 L30IN NONABSORBABLE GRN L40MM V-37 MX69G

## (undated) DEVICE — SLING ORTHOPEDIC PCH UNIV 19.5X9 IN 2-39 IN ARM W/ FOAM STRP

## (undated) DEVICE — INTRO SHTH 9FR 25X80CM -- TEARAWAY

## (undated) DEVICE — SUTURE MCRYL 3-0 L27IN ABSRB VLT SH L26MM 1/2 CIR Y316H

## (undated) DEVICE — RADIFOCUS GLIDEWIRE: Brand: GLIDEWIRE

## (undated) DEVICE — ZIP 8I SURGICAL SKIN CLOSURE DEVICE: Brand: ZIP 8I SURGICAL SKIN CLOSURE DEVICE

## (undated) DEVICE — GUIDEWIRE: Brand: PT²™

## (undated) DEVICE — NEEDLE ANGIO 18GA L9CM NRML 1 WALL SMOOTH FINISH CLR HUB FOR

## (undated) DEVICE — MEDI-TRACE CADENCE ADULT, DEFIBRILLATION ELECTRODE -RTS  (10 PR/PK) - PHYSIO-CONTROL: Brand: MEDI-TRACE CADENCE

## (undated) DEVICE — SLEEVE PRGM HD CVR PACE STRL --

## (undated) DEVICE — SPECIAL PROCEDURE DRAPE 32" X 34": Brand: SPECIAL PROCEDURE DRAPE

## (undated) DEVICE — DRSG AQUACEL SURG 3.5X6IN -- CONVERT TO ITEM 369227

## (undated) DEVICE — Device